# Patient Record
Sex: MALE | Race: WHITE | NOT HISPANIC OR LATINO | URBAN - METROPOLITAN AREA
[De-identification: names, ages, dates, MRNs, and addresses within clinical notes are randomized per-mention and may not be internally consistent; named-entity substitution may affect disease eponyms.]

---

## 2019-07-15 ENCOUNTER — EMERGENCY (EMERGENCY)
Facility: HOSPITAL | Age: 69
LOS: 0 days | Discharge: HOME | End: 2019-07-15
Admitting: EMERGENCY MEDICINE
Payer: MEDICARE

## 2019-07-15 VITALS — HEIGHT: 67 IN | WEIGHT: 250 LBS

## 2019-07-15 VITALS
HEART RATE: 93 BPM | TEMPERATURE: 98 F | RESPIRATION RATE: 18 BRPM | SYSTOLIC BLOOD PRESSURE: 125 MMHG | DIASTOLIC BLOOD PRESSURE: 64 MMHG | OXYGEN SATURATION: 98 %

## 2019-07-15 DIAGNOSIS — S91.202A UNSPECIFIED OPEN WOUND OF LEFT GREAT TOE WITH DAMAGE TO NAIL, INITIAL ENCOUNTER: ICD-10-CM
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
## 2019-07-15 DIAGNOSIS — M79.675 PAIN IN LEFT TOE(S): ICD-10-CM
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
## 2019-07-15 DIAGNOSIS — B35.1 TINEA UNGUIUM: ICD-10-CM

## 2019-07-15 PROCEDURE — 99283 EMERGENCY DEPT VISIT LOW MDM: CPT

## 2019-07-15 PROCEDURE — 73620 X-RAY EXAM OF FOOT: CPT | Mod: 26,LT

## 2019-07-15 PROCEDURE — 73610 X-RAY EXAM OF ANKLE: CPT | Mod: 26,LT

## 2019-07-15 RX ORDER — TETANUS TOXOID, REDUCED DIPHTHERIA TOXOID AND ACELLULAR PERTUSSIS VACCINE, ADSORBED 5; 2.5; 8; 8; 2.5 [IU]/.5ML; [IU]/.5ML; UG/.5ML; UG/.5ML; UG/.5ML
0.5 SUSPENSION INTRAMUSCULAR ONCE
Refills: 0 | Status: COMPLETED | OUTPATIENT
Start: 2019-07-15 | End: 2019-07-15

## 2019-07-15 RX ORDER — IBUPROFEN 200 MG
600 TABLET ORAL ONCE
Refills: 0 | Status: COMPLETED | OUTPATIENT
Start: 2019-07-15 | End: 2019-07-15

## 2019-07-15 RX ORDER — CEPHALEXIN 500 MG
1 CAPSULE ORAL
Qty: 20 | Refills: 0
Start: 2019-07-15 | End: 2019-07-24

## 2019-07-15 RX ADMIN — Medication 600 MILLIGRAM(S): at 21:54

## 2019-07-15 RX ADMIN — TETANUS TOXOID, REDUCED DIPHTHERIA TOXOID AND ACELLULAR PERTUSSIS VACCINE, ADSORBED 0.5 MILLILITER(S): 5; 2.5; 8; 8; 2.5 SUSPENSION INTRAMUSCULAR at 21:54

## 2019-07-15 NOTE — ED PROVIDER NOTE - CLINICAL SUMMARY MEDICAL DECISION MAKING FREE TEXT BOX
70 y/o M with stated PMH presents with near fall, causing his L toenail, which was already loose due to long-standing onychomycosis, to partially avulse x hrs. no active bleeding. +mild ankle swelling. XR neg for acute fx/dislocation. toenail dressed in sterile fashion and pt counseled on podiatry f/u in am. Reviewed all results and necessity for follow up. Counseled on red flags and to return for them.  Patient appears well on discharge.

## 2019-07-15 NOTE — ED PROVIDER NOTE - NS ED ROS FT
Review of Systems    Constitutional: (-) fever   Cardiovascular: (-) chest pain, (-) syncope  Respiratory: (-) cough, (-) shortness of breath  Gastrointestinal: (-) vomiting  Integumentary: (-) rash, see hpi   Hematologic: (-) easy bruising

## 2019-07-15 NOTE — ED PROVIDER NOTE - NSFOLLOWUPINSTRUCTIONS_ED_ALL_ED_FT
Fall Prevention in the Home  ImageFalls can cause injuries. They can happen to people of all ages. There are many things you can do to make your home safe and to help prevent falls.    What can I do on the outside of my home?  Regularly fix the edges of walkways and driveways and fix any cracks.  Remove anything that might make you trip as you walk through a door, such as a raised step or threshold.  Trim any bushes or trees on the path to your home.  Use bright outdoor lighting.  Clear any walking paths of anything that might make someone trip, such as rocks or tools.  Regularly check to see if handrails are loose or broken. Make sure that both sides of any steps have handrails.  Any raised decks and porches should have guardrails on the edges.  Have any leaves, snow, or ice cleared regularly.  Use sand or salt on walking paths during winter.  Clean up any spills in your garage right away. This includes oil or grease spills.  What can I do in the bathroom?  Use night lights.  Install grab bars by the toilet and in the tub and shower. Do not use towel bars as grab bars.  Use non-skid mats or decals in the tub or shower.  If you need to sit down in the shower, use a plastic, non-slip stool.  Keep the floor dry. Clean up any water that spills on the floor as soon as it happens.  Remove soap buildup in the tub or shower regularly.  Attach bath mats securely with double-sided non-slip rug tape.  Do not have throw rugs and other things on the floor that can make you trip.  What can I do in the bedroom?  Use night lights.  Make sure that you have a light by your bed that is easy to reach.  Do not use any sheets or blankets that are too big for your bed. They should not hang down onto the floor.  Have a firm chair that has side arms. You can use this for support while you get dressed.  Do not have throw rugs and other things on the floor that can make you trip.  What can I do in the kitchen?  Clean up any spills right away.  Avoid walking on wet floors.  Keep items that you use a lot in easy-to-reach places.  If you need to reach something above you, use a strong step stool that has a grab bar.  Keep electrical cords out of the way.  Do not use floor polish or wax that makes floors slippery. If you must use wax, use non-skid floor wax.  Do not have throw rugs and other things on the floor that can make you trip.  What can I do with my stairs?  Do not leave any items on the stairs.  Make sure that there are handrails on both sides of the stairs and use them. Fix handrails that are broken or loose. Make sure that handrails are as long as the stairways.  Check any carpeting to make sure that it is firmly attached to the stairs. Fix any carpet that is loose or worn.  Avoid having throw rugs at the top or bottom of the stairs. If you do have throw rugs, attach them to the floor with carpet tape.  Make sure that you have a light switch at the top of the stairs and the bottom of the stairs. If you do not have them, ask someone to add them for you.  What else can I do to help prevent falls?  Wear shoes that:    Do not have high heels.  Have rubber bottoms.  Are comfortable and fit you well.  Are closed at the toe. Do not wear sandals.    If you use a stepladder:    Make sure that it is fully opened. Do not climb a closed stepladder.  Make sure that both sides of the stepladder are locked into place.  Ask someone to hold it for you, if possible.    Clearly savita and make sure that you can see:    Any grab bars or handrails.  First and last steps.  Where the edge of each step is.    Use tools that help you move around (mobility aids) if they are needed. These include:    Canes.  Walkers.  Scooters.  Crutches.    Turn on the lights when you go into a dark area. Replace any light bulbs as soon as they burn out.  Set up your furniture so you have a clear path. Avoid moving your furniture around.  If any of your floors are uneven, fix them.  If there are any pets around you, be aware of where they are.  Review your medicines with your doctor. Some medicines can make you feel dizzy. This can increase your chance of falling.  Ask your doctor what other things that you can do to help prevent falls.    This information is not intended to replace advice given to you by your health care provider. Make sure you discuss any questions you have with your health care provider.

## 2019-07-15 NOTE — ED PROVIDER NOTE - PHYSICAL EXAMINATION
PHYSICAL EXAM:    GENERAL: Alert, appears stated age, well appearing, non-toxic  SKIN: Warm, pink and dry. MMM. no other signs of trauma.   EYE: Normal lids/conjunctiva  ENT: Normal hearing, patent oropharynx   Pulm: Bilateral BS, normal resp effort, no wheezes, stridor, or retractions  CV: RRR, no M/R/G, 2+and = DP pulses   Mskel: no erythema, cyanosis. no calf tenderness +mild medial malleolus edema. +onychomycosis of all toes +L great toe with partial nail avulsion. no nail bed laceration visualized. no active bleeding/FB. wound explored in bloodless field after copious irrigation FROM throughout with each joint isolated.   Neuro: AAOx3, no sensory/motor deficits,  5/5 strength throughout.

## 2019-07-15 NOTE — ED PROVIDER NOTE - NSFOLLOWUPCLINICS_GEN_ALL_ED_FT
[facility] Family [facility] Doctor  Family Medicine  .  [place]   Phone:   Fax:   Follow Up Time: 1-3 Days    [id number]  Podiatry  .  [place]   Phone: [phone]  Fax:   Follow Up Time: 1-3 Days

## 2019-07-15 NOTE — ED PROVIDER NOTE - CARE PLAN
Principal Discharge DX:	Nail avulsion of toe  Secondary Diagnosis:	Onychomycosis  Secondary Diagnosis:	Fall

## 2019-07-15 NOTE — ED ADULT TRIAGE NOTE - CADM TRG TX PRIOR TO ARRIVAL
Text: The above diagnosis and findings were noted on the exam but not discussed at this time with the patient. Detail Level: Zone none

## 2019-07-15 NOTE — ED PROVIDER NOTE - OBJECTIVE STATEMENT
70 y/o M with PMH gout, CAD s/p stents and CABG x 3 presents with L great toe partial nail avulsion x hrs s/p mechanical slip and near fall, causing him to scrape his L great toe on asphalt. +mild throbbing non-radiating L ankle/foot pain/swelling. no head injury/LOC. on asa, no other blood thinners. denies other injuries, decreased ROM, paraesthesias, change in color, warmth, FB, large blood loss, knowledge of last tetanus, lightheadedness, palpitations, LOC.

## 2019-07-16 RX ORDER — CEPHALEXIN 500 MG
1 CAPSULE ORAL
Qty: 20 | Refills: 0
Start: 2019-07-16 | End: 2019-07-25

## 2021-04-14 ENCOUNTER — INPATIENT (INPATIENT)
Facility: HOSPITAL | Age: 71
LOS: 14 days | Discharge: HOME HEALTH | End: 2021-04-29
Attending: HOSPITALIST | Admitting: HOSPITALIST
Payer: MEDICARE

## 2021-04-14 VITALS
RESPIRATION RATE: 20 BRPM | DIASTOLIC BLOOD PRESSURE: 76 MMHG | HEIGHT: 67 IN | HEART RATE: 86 BPM | SYSTOLIC BLOOD PRESSURE: 126 MMHG | OXYGEN SATURATION: 92 %

## 2021-04-14 LAB
ALBUMIN SERPL ELPH-MCNC: 3.3 G/DL — LOW (ref 3.5–5.2)
ALP SERPL-CCNC: 68 U/L — SIGNIFICANT CHANGE UP (ref 30–115)
ALT FLD-CCNC: 17 U/L — SIGNIFICANT CHANGE UP (ref 0–41)
ANION GAP SERPL CALC-SCNC: 16 MMOL/L — HIGH (ref 7–14)
APTT BLD: 33.7 SEC — SIGNIFICANT CHANGE UP (ref 27–39.2)
AST SERPL-CCNC: 40 U/L — SIGNIFICANT CHANGE UP (ref 0–41)
BASOPHILS # BLD AUTO: 0.01 K/UL — SIGNIFICANT CHANGE UP (ref 0–0.2)
BASOPHILS NFR BLD AUTO: 0.1 % — SIGNIFICANT CHANGE UP (ref 0–1)
BILIRUB SERPL-MCNC: 0.5 MG/DL — SIGNIFICANT CHANGE UP (ref 0.2–1.2)
BUN SERPL-MCNC: 60 MG/DL — HIGH (ref 10–20)
CALCIUM SERPL-MCNC: 8.5 MG/DL — SIGNIFICANT CHANGE UP (ref 8.5–10.1)
CHLORIDE SERPL-SCNC: 103 MMOL/L — SIGNIFICANT CHANGE UP (ref 98–110)
CO2 SERPL-SCNC: 18 MMOL/L — SIGNIFICANT CHANGE UP (ref 17–32)
CREAT SERPL-MCNC: 1.9 MG/DL — HIGH (ref 0.7–1.5)
D DIMER BLD IA.RAPID-MCNC: 320 NG/ML DDU — HIGH (ref 0–230)
EOSINOPHIL # BLD AUTO: 0 K/UL — SIGNIFICANT CHANGE UP (ref 0–0.7)
EOSINOPHIL NFR BLD AUTO: 0 % — SIGNIFICANT CHANGE UP (ref 0–8)
GLUCOSE SERPL-MCNC: 107 MG/DL — HIGH (ref 70–99)
HCT VFR BLD CALC: 39.6 % — LOW (ref 42–52)
HGB BLD-MCNC: 13.1 G/DL — LOW (ref 14–18)
IMM GRANULOCYTES NFR BLD AUTO: 1 % — HIGH (ref 0.1–0.3)
INR BLD: 1.21 RATIO — SIGNIFICANT CHANGE UP (ref 0.65–1.3)
LYMPHOCYTES # BLD AUTO: 1.12 K/UL — LOW (ref 1.2–3.4)
LYMPHOCYTES # BLD AUTO: 12.8 % — LOW (ref 20.5–51.1)
MCHC RBC-ENTMCNC: 31 PG — SIGNIFICANT CHANGE UP (ref 27–31)
MCHC RBC-ENTMCNC: 33.1 G/DL — SIGNIFICANT CHANGE UP (ref 32–37)
MCV RBC AUTO: 93.8 FL — SIGNIFICANT CHANGE UP (ref 80–94)
MONOCYTES # BLD AUTO: 0.71 K/UL — HIGH (ref 0.1–0.6)
MONOCYTES NFR BLD AUTO: 8.1 % — SIGNIFICANT CHANGE UP (ref 1.7–9.3)
NEUTROPHILS # BLD AUTO: 6.81 K/UL — HIGH (ref 1.4–6.5)
NEUTROPHILS NFR BLD AUTO: 78 % — HIGH (ref 42.2–75.2)
NRBC # BLD: 0 /100 WBCS — SIGNIFICANT CHANGE UP (ref 0–0)
NT-PROBNP SERPL-SCNC: 256 PG/ML — SIGNIFICANT CHANGE UP (ref 0–300)
PLATELET # BLD AUTO: 188 K/UL — SIGNIFICANT CHANGE UP (ref 130–400)
POTASSIUM SERPL-MCNC: 4.3 MMOL/L — SIGNIFICANT CHANGE UP (ref 3.5–5)
POTASSIUM SERPL-SCNC: 4.3 MMOL/L — SIGNIFICANT CHANGE UP (ref 3.5–5)
PROT SERPL-MCNC: 6.1 G/DL — SIGNIFICANT CHANGE UP (ref 6–8)
PROTHROM AB SERPL-ACNC: 13.9 SEC — HIGH (ref 9.95–12.87)
RBC # BLD: 4.22 M/UL — LOW (ref 4.7–6.1)
RBC # FLD: 14.2 % — SIGNIFICANT CHANGE UP (ref 11.5–14.5)
SARS-COV-2 RNA SPEC QL NAA+PROBE: DETECTED
SODIUM SERPL-SCNC: 137 MMOL/L — SIGNIFICANT CHANGE UP (ref 135–146)
TROPONIN T SERPL-MCNC: <0.01 NG/ML — SIGNIFICANT CHANGE UP
WBC # BLD: 8.74 K/UL — SIGNIFICANT CHANGE UP (ref 4.8–10.8)
WBC # FLD AUTO: 8.74 K/UL — SIGNIFICANT CHANGE UP (ref 4.8–10.8)

## 2021-04-14 PROCEDURE — 99285 EMERGENCY DEPT VISIT HI MDM: CPT

## 2021-04-14 PROCEDURE — 93010 ELECTROCARDIOGRAM REPORT: CPT

## 2021-04-14 PROCEDURE — 71045 X-RAY EXAM CHEST 1 VIEW: CPT | Mod: 26

## 2021-04-14 RX ORDER — DEXAMETHASONE 0.5 MG/5ML
6 ELIXIR ORAL ONCE
Refills: 0 | Status: COMPLETED | OUTPATIENT
Start: 2021-04-14 | End: 2021-04-14

## 2021-04-14 RX ADMIN — Medication 6 MILLIGRAM(S): at 23:45

## 2021-04-14 NOTE — ED PROVIDER NOTE - NS ED ROS FT
Constitutional: + weakness. no fever, chills, no recent weight loss, change in appetite or malaise  Eyes: no redness/discharge/pain/vision changes  ENT: no rhinorrhea/ear pain/sore throat  Cardiac: No chest pain or edema.  Respiratory: + cough and sob. No respiratory distress  GI: + diarrhea. No nausea, vomiting or abdominal pain.  : No dysuria, frequency, urgency or hematuria  MS: no pain to back or extremities, no loss of ROM, no weakness  Neuro: No headache or weakness. No LOC.  Skin: No skin rash.  Endocrine: No history of thyroid disease or diabetes.  Except as documented in the HPI, all other systems are negative.

## 2021-04-14 NOTE — ED ADULT NURSE NOTE - OBJECTIVE STATEMENT
Pt presents to ED for positive COVID 2 weeks ago. COmplaining of cough and weakness. No fevers at home. Pt also complaining of SOB. 88% on RA upon arrival to room. No distress noted. Placed on O2 @ 3L. SPO2 96%.

## 2021-04-14 NOTE — ED PROVIDER NOTE - OBJECTIVE STATEMENT
71 year old M with hx of "open heart surgery" x 10 years ago on asa, HLD, CKD c/o sob x 5 days. Pt sts initially tested covid + x 3 weeks ago (was asymptomatic at the time but has + contact). Sts for the last 4 days has had sob, cough, weakness and diarrhea. Sts pulse ox was 85% today so came to ED. No fever/chills, leg pain/swelling, nausea, vomiting, abd pain, urinary symptoms. + former smoker.     Hx obtained via Yi  #[id number] Yamini

## 2021-04-14 NOTE — ED PROVIDER NOTE - CLINICAL SUMMARY MEDICAL DECISION MAKING FREE TEXT BOX
hypoxia 2/2 covid - cxr covid pna - saturating well on 3lnc - Cr 1.9, no priors for comparison - admitted for suppl O2 & further mgmt

## 2021-04-14 NOTE — ED PROVIDER NOTE - ATTENDING CONTRIBUTION TO CARE
71M former smoker pmh cad/stent/cabg, hl, ckf, gout p/w hypoxia 2/2 covid. Dx 2-3 wks ago, rpts continued cough & sob, msring pulse @ home today was 85% so came to ED. 88% on RA in ED -> improved to 96% on 3lnc. Denies f/c, sore throat, cp, nvd, abd pain, edema, rash. PCP Mariano.    PE:  elderly m nad  skin warm, dry  ncat  neck supple  rrr nl s1s2 no mrg  nl wob good air entry bl ctab no wrr  abd soft ntnd no palpable masses no rgr  back non-tender no cvat  ext no cce dpi  neuro aaox3 grossly nf exam

## 2021-04-15 DIAGNOSIS — Z95.1 PRESENCE OF AORTOCORONARY BYPASS GRAFT: Chronic | ICD-10-CM

## 2021-04-15 DIAGNOSIS — S42.302A UNSPECIFIED FRACTURE OF SHAFT OF HUMERUS, LEFT ARM, INITIAL ENCOUNTER FOR CLOSED FRACTURE: Chronic | ICD-10-CM

## 2021-04-15 LAB
ANION GAP SERPL CALC-SCNC: 17 MMOL/L — HIGH (ref 7–14)
ANISOCYTOSIS BLD QL: SLIGHT — SIGNIFICANT CHANGE UP
BASOPHILS # BLD AUTO: 0 K/UL — SIGNIFICANT CHANGE UP (ref 0–0.2)
BASOPHILS NFR BLD AUTO: 0 % — SIGNIFICANT CHANGE UP (ref 0–1)
BUN SERPL-MCNC: 60 MG/DL — HIGH (ref 10–20)
BURR CELLS BLD QL SMEAR: PRESENT — SIGNIFICANT CHANGE UP
CALCIUM SERPL-MCNC: 9.1 MG/DL — SIGNIFICANT CHANGE UP (ref 8.5–10.1)
CHLORIDE SERPL-SCNC: 102 MMOL/L — SIGNIFICANT CHANGE UP (ref 98–110)
CO2 SERPL-SCNC: 19 MMOL/L — SIGNIFICANT CHANGE UP (ref 17–32)
CREAT SERPL-MCNC: 1.9 MG/DL — HIGH (ref 0.7–1.5)
CRP SERPL-MCNC: 243 MG/L — HIGH
EOSINOPHIL # BLD AUTO: 0 K/UL — SIGNIFICANT CHANGE UP (ref 0–0.7)
EOSINOPHIL NFR BLD AUTO: 0 % — SIGNIFICANT CHANGE UP (ref 0–8)
FERRITIN SERPL-MCNC: 1593 NG/ML — HIGH (ref 30–400)
GIANT PLATELETS BLD QL SMEAR: PRESENT — SIGNIFICANT CHANGE UP
GLUCOSE SERPL-MCNC: 151 MG/DL — HIGH (ref 70–99)
HCT VFR BLD CALC: 39.3 % — LOW (ref 42–52)
HCV AB S/CO SERPL IA: 0.03 COI — SIGNIFICANT CHANGE UP
HCV AB SERPL-IMP: SIGNIFICANT CHANGE UP
HGB BLD-MCNC: 13 G/DL — LOW (ref 14–18)
LYMPHOCYTES # BLD AUTO: 0.25 K/UL — LOW (ref 1.2–3.4)
LYMPHOCYTES # BLD AUTO: 3.4 % — LOW (ref 20.5–51.1)
MACROCYTES BLD QL: SLIGHT — SIGNIFICANT CHANGE UP
MAGNESIUM SERPL-MCNC: 2.3 MG/DL — SIGNIFICANT CHANGE UP (ref 1.8–2.4)
MANUAL SMEAR VERIFICATION: SIGNIFICANT CHANGE UP
MCHC RBC-ENTMCNC: 31.1 PG — HIGH (ref 27–31)
MCHC RBC-ENTMCNC: 33.1 G/DL — SIGNIFICANT CHANGE UP (ref 32–37)
MCV RBC AUTO: 94 FL — SIGNIFICANT CHANGE UP (ref 80–94)
MONOCYTES # BLD AUTO: 0.39 K/UL — SIGNIFICANT CHANGE UP (ref 0.1–0.6)
MONOCYTES NFR BLD AUTO: 5.2 % — SIGNIFICANT CHANGE UP (ref 1.7–9.3)
NEUTROPHILS # BLD AUTO: 6.68 K/UL — HIGH (ref 1.4–6.5)
NEUTROPHILS NFR BLD AUTO: 89.7 % — HIGH (ref 42.2–75.2)
PHOSPHATE SERPL-MCNC: 4.9 MG/DL — SIGNIFICANT CHANGE UP (ref 2.1–4.9)
PLAT MORPH BLD: ABNORMAL
PLATELET # BLD AUTO: 211 K/UL — SIGNIFICANT CHANGE UP (ref 130–400)
POIKILOCYTOSIS BLD QL AUTO: SLIGHT — SIGNIFICANT CHANGE UP
POTASSIUM SERPL-MCNC: 4.7 MMOL/L — SIGNIFICANT CHANGE UP (ref 3.5–5)
POTASSIUM SERPL-SCNC: 4.7 MMOL/L — SIGNIFICANT CHANGE UP (ref 3.5–5)
PROCALCITONIN SERPL-MCNC: 0.36 NG/ML — HIGH (ref 0.02–0.1)
RBC # BLD: 4.18 M/UL — LOW (ref 4.7–6.1)
RBC # FLD: 14.3 % — SIGNIFICANT CHANGE UP (ref 11.5–14.5)
RBC BLD AUTO: ABNORMAL
SODIUM SERPL-SCNC: 138 MMOL/L — SIGNIFICANT CHANGE UP (ref 135–146)
VARIANT LYMPHS # BLD: 1.7 % — SIGNIFICANT CHANGE UP (ref 0–5)
WBC # BLD: 7.45 K/UL — SIGNIFICANT CHANGE UP (ref 4.8–10.8)
WBC # FLD AUTO: 7.45 K/UL — SIGNIFICANT CHANGE UP (ref 4.8–10.8)

## 2021-04-15 PROCEDURE — 99223 1ST HOSP IP/OBS HIGH 75: CPT

## 2021-04-15 PROCEDURE — 76770 US EXAM ABDO BACK WALL COMP: CPT | Mod: 26

## 2021-04-15 RX ORDER — EZETIMIBE 10 MG/1
0 TABLET ORAL
Qty: 0 | Refills: 0 | DISCHARGE

## 2021-04-15 RX ORDER — FUROSEMIDE 40 MG
0 TABLET ORAL
Qty: 0 | Refills: 0 | DISCHARGE

## 2021-04-15 RX ORDER — ASPIRIN/CALCIUM CARB/MAGNESIUM 324 MG
81 TABLET ORAL DAILY
Refills: 0 | Status: DISCONTINUED | OUTPATIENT
Start: 2021-04-15 | End: 2021-04-29

## 2021-04-15 RX ORDER — CHLORHEXIDINE GLUCONATE 213 G/1000ML
1 SOLUTION TOPICAL
Refills: 0 | Status: DISCONTINUED | OUTPATIENT
Start: 2021-04-15 | End: 2021-04-29

## 2021-04-15 RX ORDER — TAMSULOSIN HYDROCHLORIDE 0.4 MG/1
1 CAPSULE ORAL
Qty: 0 | Refills: 0 | DISCHARGE

## 2021-04-15 RX ORDER — ASCORBIC ACID 60 MG
0 TABLET,CHEWABLE ORAL
Qty: 0 | Refills: 0 | DISCHARGE

## 2021-04-15 RX ORDER — ALLOPURINOL 300 MG
300 TABLET ORAL DAILY
Refills: 0 | Status: DISCONTINUED | OUTPATIENT
Start: 2021-04-15 | End: 2021-04-29

## 2021-04-15 RX ORDER — EZETIMIBE 10 MG/1
1 TABLET ORAL
Qty: 0 | Refills: 0 | DISCHARGE

## 2021-04-15 RX ORDER — ATORVASTATIN CALCIUM 80 MG/1
80 TABLET, FILM COATED ORAL AT BEDTIME
Refills: 0 | Status: DISCONTINUED | OUTPATIENT
Start: 2021-04-15 | End: 2021-04-28

## 2021-04-15 RX ORDER — RANOLAZINE 500 MG/1
1000 TABLET, FILM COATED, EXTENDED RELEASE ORAL
Refills: 0 | Status: DISCONTINUED | OUTPATIENT
Start: 2021-04-15 | End: 2021-04-29

## 2021-04-15 RX ORDER — DEXAMETHASONE 0.5 MG/5ML
6 ELIXIR ORAL DAILY
Refills: 0 | Status: COMPLETED | OUTPATIENT
Start: 2021-04-15 | End: 2021-04-23

## 2021-04-15 RX ORDER — ASPIRIN/CALCIUM CARB/MAGNESIUM 324 MG
1 TABLET ORAL
Qty: 0 | Refills: 0 | DISCHARGE

## 2021-04-15 RX ORDER — ALLOPURINOL 300 MG
1 TABLET ORAL
Qty: 0 | Refills: 0 | DISCHARGE

## 2021-04-15 RX ORDER — METOPROLOL TARTRATE 50 MG
100 TABLET ORAL
Refills: 0 | Status: DISCONTINUED | OUTPATIENT
Start: 2021-04-15 | End: 2021-04-29

## 2021-04-15 RX ORDER — FUROSEMIDE 40 MG
1 TABLET ORAL
Qty: 0 | Refills: 0 | DISCHARGE

## 2021-04-15 RX ORDER — ALLOPURINOL 300 MG
0 TABLET ORAL
Qty: 0 | Refills: 0 | DISCHARGE

## 2021-04-15 RX ORDER — GEMFIBROZIL 600 MG
600 TABLET ORAL
Refills: 0 | Status: DISCONTINUED | OUTPATIENT
Start: 2021-04-15 | End: 2021-04-29

## 2021-04-15 RX ORDER — TAMSULOSIN HYDROCHLORIDE 0.4 MG/1
0.4 CAPSULE ORAL AT BEDTIME
Refills: 0 | Status: DISCONTINUED | OUTPATIENT
Start: 2021-04-15 | End: 2021-04-29

## 2021-04-15 RX ORDER — METOPROLOL TARTRATE 50 MG
0 TABLET ORAL
Qty: 0 | Refills: 0 | DISCHARGE

## 2021-04-15 RX ORDER — RANOLAZINE 500 MG/1
0 TABLET, FILM COATED, EXTENDED RELEASE ORAL
Qty: 0 | Refills: 0 | DISCHARGE

## 2021-04-15 RX ORDER — RANOLAZINE 500 MG/1
1 TABLET, FILM COATED, EXTENDED RELEASE ORAL
Qty: 0 | Refills: 0 | DISCHARGE

## 2021-04-15 RX ORDER — PARICALCITOL 2 UG/1
1 CAPSULE, GELATIN COATED ORAL
Qty: 0 | Refills: 0 | DISCHARGE

## 2021-04-15 RX ORDER — HEPARIN SODIUM 5000 [USP'U]/ML
7500 INJECTION INTRAVENOUS; SUBCUTANEOUS EVERY 12 HOURS
Refills: 0 | Status: DISCONTINUED | OUTPATIENT
Start: 2021-04-15 | End: 2021-04-27

## 2021-04-15 RX ORDER — GEMFIBROZIL 600 MG
0 TABLET ORAL
Qty: 0 | Refills: 0 | DISCHARGE

## 2021-04-15 RX ORDER — SODIUM CHLORIDE 9 MG/ML
1000 INJECTION INTRAMUSCULAR; INTRAVENOUS; SUBCUTANEOUS
Refills: 0 | Status: DISCONTINUED | OUTPATIENT
Start: 2021-04-15 | End: 2021-04-16

## 2021-04-15 RX ORDER — METOPROLOL TARTRATE 50 MG
1 TABLET ORAL
Qty: 0 | Refills: 0 | DISCHARGE

## 2021-04-15 RX ORDER — TAMSULOSIN HYDROCHLORIDE 0.4 MG/1
0 CAPSULE ORAL
Qty: 0 | Refills: 0 | DISCHARGE

## 2021-04-15 RX ORDER — LISINOPRIL 2.5 MG/1
0 TABLET ORAL
Qty: 0 | Refills: 0 | DISCHARGE

## 2021-04-15 RX ADMIN — HEPARIN SODIUM 7500 UNIT(S): 5000 INJECTION INTRAVENOUS; SUBCUTANEOUS at 07:01

## 2021-04-15 RX ADMIN — Medication 100 MILLIGRAM(S): at 07:00

## 2021-04-15 RX ADMIN — RANOLAZINE 1000 MILLIGRAM(S): 500 TABLET, FILM COATED, EXTENDED RELEASE ORAL at 17:55

## 2021-04-15 RX ADMIN — Medication 6 MILLIGRAM(S): at 07:01

## 2021-04-15 RX ADMIN — Medication 100 MILLIGRAM(S): at 17:56

## 2021-04-15 RX ADMIN — Medication 10 MILLIGRAM(S): at 21:39

## 2021-04-15 RX ADMIN — CHLORHEXIDINE GLUCONATE 1 APPLICATION(S): 213 SOLUTION TOPICAL at 07:00

## 2021-04-15 RX ADMIN — Medication 600 MILLIGRAM(S): at 17:58

## 2021-04-15 RX ADMIN — Medication 81 MILLIGRAM(S): at 12:06

## 2021-04-15 RX ADMIN — HEPARIN SODIUM 7500 UNIT(S): 5000 INJECTION INTRAVENOUS; SUBCUTANEOUS at 17:58

## 2021-04-15 RX ADMIN — Medication 300 MILLIGRAM(S): at 12:06

## 2021-04-15 RX ADMIN — RANOLAZINE 1000 MILLIGRAM(S): 500 TABLET, FILM COATED, EXTENDED RELEASE ORAL at 07:00

## 2021-04-15 RX ADMIN — SODIUM CHLORIDE 70 MILLILITER(S): 9 INJECTION INTRAMUSCULAR; INTRAVENOUS; SUBCUTANEOUS at 12:08

## 2021-04-15 RX ADMIN — ATORVASTATIN CALCIUM 80 MILLIGRAM(S): 80 TABLET, FILM COATED ORAL at 21:39

## 2021-04-15 RX ADMIN — Medication 600 MILLIGRAM(S): at 07:00

## 2021-04-15 RX ADMIN — TAMSULOSIN HYDROCHLORIDE 0.4 MILLIGRAM(S): 0.4 CAPSULE ORAL at 21:39

## 2021-04-15 NOTE — H&P ADULT - NSHPPHYSICALEXAM_GEN_ALL_CORE
GENERAL: NAD, well-developed, AAOx3  HEENT:  Atraumatic, Normocephalic. EOMI, PERRLA, conjunctiva and sclera clear, No JVD  PULMONARY: Clear to auscultation bilaterally; No wheeze  CARDIOVASCULAR: Regular rate and rhythm; No murmurs, rubs, or gallops  GASTROINTESTINAL: Soft, Nontender, Nondistended; Bowel sounds present  MUSCULOSKELETAL:  2+ Peripheral Pulses, No clubbing, cyanosis, or edema  NEUROLOGY: non-focal  SKIN: No rashes or lesions GENERAL: NAD, well-developed, obese, AAOx3  HEENT:  Atraumatic, Normocephalic. EOMI, conjunctiva and sclera clear, No JVD  PULMONARY: mild respiratory effort, Clear to auscultation bilaterally; No wheeze  CARDIOVASCULAR: Regular rate and rhythm; No murmurs, rubs, or gallops  GASTROINTESTINAL: Soft, mildly general tenderness to palpation, Nondistended; Bowel sounds present  MUSCULOSKELETAL: no cyanosis or edema  NEUROLOGY: non-focal  SKIN: No rashes or lesions

## 2021-04-15 NOTE — CHART NOTE - NSCHARTNOTEFT_GEN_A_CORE
I made rounds today with the treatment team including the hospitalist, residents,  nurses, and discussed the patient's current medical status and discharge  planning needs, and reviewed the chart.    T(C): 36.7 (04-15-21 @ 05:39), Max: 37.2 (04-14-21 @ 21:27)  HR: 89 (04-15-21 @ 05:39) (80 - 89)  BP: 104/56 (04-15-21 @ 05:39) (104/56 - 126/76)  RR: 18 (04-15-21 @ 05:39) (18 - 22)  SpO2: 96% (04-15-21 @ 05:39) (88% - 96%)          I reached out to the patient's health care proxy/ responsible family member-           [     ]  I reached                                     and discussed the patient's medical condition,                   family concerns, and discharge planning           [     ]  I left a message with family               [  x   ]  I personally participated in rounds with the medical team and my resident and discussed the case. My resident reached                   family member/ HCP      Narda, daughter, [phone]                          under my direction and supervision  and we reviewed the conversation.          [     ]  My resident left a message with family under my direction and supervision           [     ]   My resident attended medical rounds and called the family                [   x   ]    The following was discussed:  The patient's medical status over the past 24 hrs was reviewed, as well as oxygen needs and medication changes and labs. On 3 liters NC O2. On Decadron. To check Covid antibodies.          [  x    ]   The following concerns were raised: none          [     ] I spent 5-10 minutes on the above discussing medical issues with team members and family and/ or my resident    [   x  ] I spent 11-20 minutes on the above discussing medical issues with team members and family and/ or my resident    [     ] I spent 21-30 minutes on the above discussing medical issues with team members and family and/ or my resident

## 2021-04-15 NOTE — H&P ADULT - NSICDXPASTSURGICALHX_GEN_ALL_CORE_FT
PAST SURGICAL HISTORY:  Arm fracture, left s/p repair    S/P CABG (coronary artery bypass graft)

## 2021-04-15 NOTE — H&P ADULT - ASSESSMENT
Pt is a 71 year old male with PMHx of CAD s/p CABG ~10 years ago, HTN, BPH, HLD, CKD due to R sided renal disease? presents with SOB, cough, general, malaise, and decreased PO intake for past 4-5 days.     # COVID-19, severe disease  - satting in mid 80s on RA, currently ~94 on 5L NC  - out of window for remdesivir  - started on course 6mg dexa for 10 days  - f/u inflammatory markers  - f/u COVID ab for possible plasma  - d-dimer 320, no calf pain  - f/u ID eval    # RUTH? vs CKD due to R sided renal disease?, likely pre-renal in setting of decreased PO intake  - Cr 1.9, GFR 35 on admission  - pt reports that he specifically has R sided renal disease, unknown cause  - holding lisinopril and lasix for now  - f/u AM labs    # CAD s/p CABG ~10 years ago  - continue asa 81, metoprolol, Ranolazine    # HTN- controlled  - continue home metoprolol  - holding lasix and lisinopril due to possible RUTH, f/u AM labs and order accordingly    # BPH  - continue flomax    # HLD  - continue statin, gemfibrozil  - ezetimibe non formulary      DVT ppx: heparin 7500 BID as per protocol; f/u height/weight to confirm dosing  GI ppx: NA  Diet: DASH, Halal  Code Status: Full Code

## 2021-04-15 NOTE — H&P ADULT - TIME BILLING
complete patient's bedside assessment, review medical chart, discuss medical plan of care with covering medical team

## 2021-04-15 NOTE — H&P ADULT - NSICDXFAMILYHX_GEN_ALL_CORE_FT
FAMILY HISTORY:  Father  Still living? No  FH: prostate cancer, Age at diagnosis: Age Unknown    Mother  Still living? No  FH: uterine cancer, Age at diagnosis: Age Unknown

## 2021-04-15 NOTE — CONSULT NOTE ADULT - ASSESSMENT
ASSESSMENT  71 year old male with PMHx of CAD s/p CABG ~10 years ago, HTN, BPH, HLD, CKD due to R sided renal disease? tested positive for COVID19 3 weeks ago, presents with SOB, cough, general, malaise, and decreased PO intake for past 4-5 days.     IMPRESSION  #COVID19 PNA, Severe (O2 < or = 94% on RA and requiring supplemental O2), inflammatory phase    Tested + 3 weeks prior after family member exposure    CXR bilateral opacities     Admission WBC 8   D-Dimer Assay, Quantitative: 320 ng/mL DDU (04-14-21 @ 21:00)  #CKD  Creatinine, Serum: 1.9 (04-14-21 @ 21:00)      RECOMMENDATIONS  - Past benefit of RDV  - Check COVID Ab and if NEGATIVE, can offer and consent for convalescent Plasma: 1 unit over 2h  (Please  patient- will not be able to receive vaccine x 90 days)  - Dexamethasone 6mg x 10 days or until Discharge (whichever is shorter), any taper per Pulm  - ONLY If requiring HFNC or BIPAP and CRP >75 --> Tocilizumab x1 (Wt <65 kg= 400 mg, 65-90 kg =600 mg, >90 kg =800 mg)  - A/C per primary team  - Prone positioning if possible  - Monitor off Abx , viral PNA    If any questions, please call or send a message on [employer] Teams  Spectra [id number]

## 2021-04-15 NOTE — CONSULT NOTE ADULT - SUBJECTIVE AND OBJECTIVE BOX
JORDIN GARCIA  71y, Male  Allergy: No Known Allergies      CHIEF COMPLAINT:   COVID-19 (15 Apr 2021 00:03)      LOS  1d    HPI  HPI:  Pt is a 71 year old male with PMHx of CAD s/p CABG ~10 years ago, HTN, BPH, HLD, CKD due to R sided renal disease? presents with SOB, cough, general, malaise, and decreased PO intake for past 4-5 days. Pt reports that family member tested positive fort COVID 3 weeks ago so he got tested and was positive as well. Symptoms were very mild up until 4-5 days ago.  x 5 days. Pulse ox at home today was in the 80s so he came to ED. No headache, fever, nausea, vomiting,  leg pain/swelling, change in bowel habits or urinary symptoms.     In the ED: satting in 80s on RA with mild tachypnea. Vitals otherwise wnl.  CBC with lymphopenia  Cr 1.9, unknown baseline  D-dimer 320  Trop x1, BNP wnl (15 Apr 2021 00:03)      INFECTIOUS DISEASE HISTORY:  Satting 96% on 3L  CXR bilateral opacities   D-Dimer Assay, Quantitative: 320 ng/mL DDU (04-14-21 @ 21:00)      PMH  PAST MEDICAL & SURGICAL HISTORY:  CAD (coronary artery disease)  s/p CABG    High cholesterol    Chronic kidney disease, unspecified CKD stage    BPH (benign prostatic hyperplasia)    HTN (hypertension)    S/P CABG (coronary artery bypass graft)    Arm fracture, left  s/p repair        FAMILY HISTORY  FH: prostate cancer (Father)    FH: uterine cancer (Mother)        SOCIAL HISTORY  Social History:  Lives at home.  Former smoker.  Denies alcohol or illicit drug use. (15 Apr 2021 00:03)        ROS  General: Denies rigors, nightsweats  HEENT: Denies headache, rhinorrhea, sore throat, eye pain  CV: Denies CP, palpitations  PULM: Denies wheezing, hemoptysis  GI: Denies hematemesis, hematochezia, melena  : Denies discharge, hematuria  MSK: Denies arthralgias, myalgias  SKIN: Denies rash, lesions  NEURO: Denies paresthesias, weakness  PSYCH: Denies depression, anxiety    VITALS:  T(F): 98, Max: 99 (04-14-21 @ 21:27)  HR: 89  BP: 104/56  RR: 18Vital Signs Last 24 Hrs  T(C): 36.7 (15 Apr 2021 05:39), Max: 37.2 (14 Apr 2021 21:27)  T(F): 98 (15 Apr 2021 05:39), Max: 99 (14 Apr 2021 21:27)  HR: 89 (15 Apr 2021 05:39) (80 - 89)  BP: 104/56 (15 Apr 2021 05:39) (104/56 - 126/76)  BP(mean): --  RR: 18 (15 Apr 2021 05:39) (18 - 22)  SpO2: 96% (15 Apr 2021 05:39) (88% - 96%)      TESTS & MEASUREMENTS:                        13.1   8.74  )-----------( 188      ( 14 Apr 2021 21:00 )             39.6     04-14    137  |  103  |  60<H>  ----------------------------<  107<H>  4.3   |  18  |  1.9<H>    Ca    8.5      14 Apr 2021 21:00    TPro  6.1  /  Alb  3.3<L>  /  TBili  0.5  /  DBili  x   /  AST  40  /  ALT  17  /  AlkPhos  68  04-14    eGFR if African American: 40 mL/min/1.73M2 (04-14-21 @ 21:00)  eGFR if Non African American: 35 mL/min/1.73M2 (04-14-21 @ 21:00)    LIVER FUNCTIONS - ( 14 Apr 2021 21:00 )  Alb: 3.3 g/dL / Pro: 6.1 g/dL / ALK PHOS: 68 U/L / ALT: 17 U/L / AST: 40 U/L / GGT: x                     INFECTIOUS DISEASES TESTING  COVID-19 PCR: Detected (04-14-21 @ 21:00)      INFLAMMATORY MARKERS      RADIOLOGY & ADDITIONAL TESTS:  I have personally reviewed the last Chest xray  CXR      CT      CARDIOLOGY TESTING      MEDICATIONS  allopurinol 300 Oral daily  aspirin enteric coated 81 Oral daily  atorvastatin 80 Oral at bedtime  chlorhexidine 4% Liquid 1 Topical <User Schedule>  dexAMETHasone  Injectable 6 IV Push daily  dicyclomine 10 Oral daily  gemfibrozil 600 Oral two times a day  heparin   Injectable 7500 SubCutaneous every 12 hours  metoprolol tartrate 100 Oral two times a day  ranolazine 1000 Oral two times a day  tamsulosin 0.4 Oral at bedtime      Weight      ANTIBIOTICS:      ALLERGIES:  No Known Allergies

## 2021-04-15 NOTE — H&P ADULT - NSICDXPASTMEDICALHX_GEN_ALL_CORE_FT
PAST MEDICAL HISTORY:  BPH (benign prostatic hyperplasia)     CAD (coronary artery disease) s/p CABG    Chronic kidney disease, unspecified CKD stage     High cholesterol     HTN (hypertension)

## 2021-04-15 NOTE — H&P ADULT - ATTENDING COMMENTS
Patient reports no dyspnea at rest, no cough, tolerating diet well.  a/p:  Pt is a 71 year old male with PMHx of CAD s/p CABG ~10 years ago, HTN, BPH, HLD, CKD ,  presents with SOB, cough, general, malaise, and decreased PO intake for past 4-5 days.     # Acute hypoxic resp. failure due to COVID-19, severe disease  - satting 96% on 3 L NC, most likely pt. is in late inflammatory state,  out of window for remdesivir,  started on course 6mg dexa for 10 days  - f/u inflammatory markers, maintain isolation  - f/u COVID ab for possible plasma  - d-dimer 320  - f/u ID     # CKD stage 3,   - Cr 1.9, GFR 35 on admission  -hold ace , monitor daily BMP, renal diet      # CAD s/p CABG ~10 years ago  - continue asa 81, metoprolol, Ranolazine    # HTN- controlled  - continue home metoprolol  - holding lasix and lisinopril     # BPH  - continue flomax    # HLD  - continue statin, gemfibrozil  - ezetimibe non formulary    # DVT proph.- Heparin subc. inj.

## 2021-04-15 NOTE — CONSULT NOTE ADULT - TIME BILLING
I have personally seen and examined this patient.  I have reviewed all pertinent clinical information and reviewed all relevant imaging and diagnostic studies personally.   I counseled the patient about diagnostic testing and treatment plan. All questions were answered.  I discussed recommendations with the primary team.

## 2021-04-15 NOTE — H&P ADULT - HISTORY OF PRESENT ILLNESS
Pt is a 71 year old male with PMHx of CAD s/p CABG ~10 years ago, HTN, BPH, HLD, CKD due to R sided renal disease? presents with SOB, cough, general, malaise, and decreased PO intake for past 4-5 days. Pt reports that family member tested positive fort COVID 3 weeks ago so he got tested and was positive as well. Symptoms were very mild up until 4-5 days ago.  x 5 days. Pulse ox at home today was in the 80s so he came to ED. No headache, fever, nausea, vomiting,  leg pain/swelling, change in bowel habits or urinary symptoms.     In the ED: satting in 80s on RA with mild tachypnea. Vitals otherwise wnl.  CBC with lymphopenia  Cr 1.9, unknown baseline  D-dimer 320  Trop x1, BNP wnl

## 2021-04-15 NOTE — H&P ADULT - NSHPREVIEWOFSYSTEMS_GEN_ALL_CORE
CONSTITUTIONAL: No weakness, fevers or chills, no weight loss   EYES/ENT: No visual changes;  No vertigo or throat pain   NECK: No pain or stiffness  RESPIRATORY: No cough, wheezing, hemoptysis; No shortness of breath  CARDIOVASCULAR: No chest pain or palpitations  GASTROINTESTINAL: No abdominal or epigastric pain. No nausea, vomiting, or hematemesis; No diarrhea or constipation. No melena or hematochezia.  GENITOURINARY: No dysuria, frequency or hematuria  NEUROLOGICAL: No numbness or weakness  All other review of systems is negative unless indicated above. CONSTITUTIONAL: +weakness. No fevers or chills, no weight loss   EYES/ENT: No visual changes;  No vertigo or throat pain   NECK: No pain or stiffness  RESPIRATORY: + cough and shortness of breath. No wheezing, hemoptysis  CARDIOVASCULAR: No chest pain or palpitations  GASTROINTESTINAL: mild generalized abdominal soreness. No nausea, vomiting, or hematemesis; No diarrhea or constipation. No melena or hematochezia.  GENITOURINARY: No dysuria, frequency or hematuria  NEUROLOGICAL: No numbness or weakness  All other review of systems is negative unless indicated above.

## 2021-04-16 LAB
ANION GAP SERPL CALC-SCNC: 10 MMOL/L — SIGNIFICANT CHANGE UP (ref 7–14)
BASOPHILS # BLD AUTO: 0.01 K/UL — SIGNIFICANT CHANGE UP (ref 0–0.2)
BASOPHILS NFR BLD AUTO: 0.1 % — SIGNIFICANT CHANGE UP (ref 0–1)
BUN SERPL-MCNC: 66 MG/DL — CRITICAL HIGH (ref 10–20)
CALCIUM SERPL-MCNC: 8.1 MG/DL — LOW (ref 8.5–10.1)
CHLORIDE SERPL-SCNC: 107 MMOL/L — SIGNIFICANT CHANGE UP (ref 98–110)
CO2 SERPL-SCNC: 19 MMOL/L — SIGNIFICANT CHANGE UP (ref 17–32)
COVID-19 NUCLEOCAPSID GAM AB INTERP: NEGATIVE — SIGNIFICANT CHANGE UP
COVID-19 NUCLEOCAPSID TOTAL GAM ANTIBODY RESULT: 0.81 INDEX — SIGNIFICANT CHANGE UP
COVID-19 SPIKE DOMAIN AB INTERP: POSITIVE
COVID-19 SPIKE DOMAIN ANTIBODY RESULT: 0.94 U/ML — HIGH
CREAT SERPL-MCNC: 1.7 MG/DL — HIGH (ref 0.7–1.5)
EOSINOPHIL # BLD AUTO: 0 K/UL — SIGNIFICANT CHANGE UP (ref 0–0.7)
EOSINOPHIL NFR BLD AUTO: 0 % — SIGNIFICANT CHANGE UP (ref 0–8)
GLUCOSE SERPL-MCNC: 115 MG/DL — HIGH (ref 70–99)
HCT VFR BLD CALC: 34.6 % — LOW (ref 42–52)
HGB BLD-MCNC: 11.5 G/DL — LOW (ref 14–18)
IMM GRANULOCYTES NFR BLD AUTO: 0.5 % — HIGH (ref 0.1–0.3)
LYMPHOCYTES # BLD AUTO: 0.87 K/UL — LOW (ref 1.2–3.4)
LYMPHOCYTES # BLD AUTO: 7.7 % — LOW (ref 20.5–51.1)
MCHC RBC-ENTMCNC: 31 PG — SIGNIFICANT CHANGE UP (ref 27–31)
MCHC RBC-ENTMCNC: 33.2 G/DL — SIGNIFICANT CHANGE UP (ref 32–37)
MCV RBC AUTO: 93.3 FL — SIGNIFICANT CHANGE UP (ref 80–94)
MONOCYTES # BLD AUTO: 0.61 K/UL — HIGH (ref 0.1–0.6)
MONOCYTES NFR BLD AUTO: 5.4 % — SIGNIFICANT CHANGE UP (ref 1.7–9.3)
NEUTROPHILS # BLD AUTO: 9.82 K/UL — HIGH (ref 1.4–6.5)
NEUTROPHILS NFR BLD AUTO: 86.3 % — HIGH (ref 42.2–75.2)
NRBC # BLD: 0 /100 WBCS — SIGNIFICANT CHANGE UP (ref 0–0)
PLATELET # BLD AUTO: 204 K/UL — SIGNIFICANT CHANGE UP (ref 130–400)
POTASSIUM SERPL-MCNC: 4.1 MMOL/L — SIGNIFICANT CHANGE UP (ref 3.5–5)
POTASSIUM SERPL-SCNC: 4.1 MMOL/L — SIGNIFICANT CHANGE UP (ref 3.5–5)
RBC # BLD: 3.71 M/UL — LOW (ref 4.7–6.1)
RBC # FLD: 14.1 % — SIGNIFICANT CHANGE UP (ref 11.5–14.5)
SARS-COV-2 IGG+IGM SERPL QL IA: 0.81 INDEX — SIGNIFICANT CHANGE UP
SARS-COV-2 IGG+IGM SERPL QL IA: 0.94 U/ML — HIGH
SARS-COV-2 IGG+IGM SERPL QL IA: NEGATIVE — SIGNIFICANT CHANGE UP
SARS-COV-2 IGG+IGM SERPL QL IA: POSITIVE
SODIUM SERPL-SCNC: 136 MMOL/L — SIGNIFICANT CHANGE UP (ref 135–146)
WBC # BLD: 11.37 K/UL — HIGH (ref 4.8–10.8)
WBC # FLD AUTO: 11.37 K/UL — HIGH (ref 4.8–10.8)

## 2021-04-16 PROCEDURE — 99233 SBSQ HOSP IP/OBS HIGH 50: CPT

## 2021-04-16 RX ORDER — PANTOPRAZOLE SODIUM 20 MG/1
40 TABLET, DELAYED RELEASE ORAL
Refills: 0 | Status: DISCONTINUED | OUTPATIENT
Start: 2021-04-16 | End: 2021-04-29

## 2021-04-16 RX ADMIN — ATORVASTATIN CALCIUM 80 MILLIGRAM(S): 80 TABLET, FILM COATED ORAL at 21:28

## 2021-04-16 RX ADMIN — Medication 600 MILLIGRAM(S): at 05:08

## 2021-04-16 RX ADMIN — Medication 6 MILLIGRAM(S): at 05:08

## 2021-04-16 RX ADMIN — HEPARIN SODIUM 7500 UNIT(S): 5000 INJECTION INTRAVENOUS; SUBCUTANEOUS at 05:09

## 2021-04-16 RX ADMIN — SODIUM CHLORIDE 70 MILLILITER(S): 9 INJECTION INTRAMUSCULAR; INTRAVENOUS; SUBCUTANEOUS at 05:10

## 2021-04-16 RX ADMIN — Medication 300 MILLIGRAM(S): at 11:20

## 2021-04-16 RX ADMIN — Medication 600 MILLIGRAM(S): at 17:05

## 2021-04-16 RX ADMIN — RANOLAZINE 1000 MILLIGRAM(S): 500 TABLET, FILM COATED, EXTENDED RELEASE ORAL at 17:05

## 2021-04-16 RX ADMIN — CHLORHEXIDINE GLUCONATE 1 APPLICATION(S): 213 SOLUTION TOPICAL at 05:10

## 2021-04-16 RX ADMIN — TAMSULOSIN HYDROCHLORIDE 0.4 MILLIGRAM(S): 0.4 CAPSULE ORAL at 21:29

## 2021-04-16 RX ADMIN — Medication 100 MILLIGRAM(S): at 17:05

## 2021-04-16 RX ADMIN — Medication 81 MILLIGRAM(S): at 11:20

## 2021-04-16 RX ADMIN — Medication 100 MILLIGRAM(S): at 05:08

## 2021-04-16 RX ADMIN — Medication 10 MILLIGRAM(S): at 11:20

## 2021-04-16 RX ADMIN — HEPARIN SODIUM 7500 UNIT(S): 5000 INJECTION INTRAVENOUS; SUBCUTANEOUS at 17:05

## 2021-04-16 RX ADMIN — RANOLAZINE 1000 MILLIGRAM(S): 500 TABLET, FILM COATED, EXTENDED RELEASE ORAL at 05:08

## 2021-04-16 NOTE — CHART NOTE - NSCHARTNOTEFT_GEN_A_CORE
I made rounds today with the treatment team including the hospitalist, residents,  nurses, and discussed the patient's current medical status and discharge  planning needs, and reviewed the chart.    T(C): 36.1 (04-16-21 @ 12:42), Max: 36.4 (04-16-21 @ 05:50)  HR: 73 (04-16-21 @ 12:42) (58 - 78)  BP: 109/65 (04-16-21 @ 12:42) (90/57 - 115/62)  RR: 18 (04-16-21 @ 12:42) (18 - 20)  SpO2: 91% (04-16-21 @ 12:42) (91% - 96%)          I reached out to the patient's health care proxy/ responsible family member-           [     ]  I reached                                     and discussed the patient's medical condition,                   family concerns, and discharge planning           [     ]  I left a message with family               [   x  ]  I personally participated in rounds with the medical team and my resident and discussed the case. My resident reached                   family member/ HCP      Narda, [phone]                          under my direction and supervision  and we reviewed the conversation.          [     ]  My resident left a message with family under my direction and supervision           [     ]   My resident attended medical rounds and called the family                [  x    ]    The following was discussed:  The patient's medical status over the past 24 hrs was reviewed, as well as oxygen needs and medication changes and labs. Stable on 3 liters NC O2. Decrease in GREEN. Creatinine better.          [   x   ]   The following concerns were raised: none          [     ] I spent 5-10 minutes on the above discussing medical issues with team members and family and/ or my resident    [  x   ] I spent 11-20 minutes on the above discussing medical issues with team members and family and/ or my resident    [     ] I spent 21-30 minutes on the above discussing medical issues with team members and family and/ or my resident

## 2021-04-16 NOTE — PROGRESS NOTE ADULT - ASSESSMENT
70 yo M with PMHx of CAD s/p CABG 10 yrs ago, HTN, BPH, HLD, CKD presents with worsening dyspnea, malaise, admitted for acute hypoxemic respiratory failure due to COVID.    #Acute hypoxemic respiratory failure secondary to COVID-19 pneumonia  - COVID-19 PCR positive  - Isolation precautions (contact, droplet, airborne)  - Chest X-ray: bilateral opacities  - Patient is saturating 95% on 3L NC  - C-Reactive Protein, Serum: 243 mg/L (04-14-21 @ 21:00), D-Dimer Assay, Quantitative: 320 ng/mL DDU (04-14-21 @ 21:00), Ferritin, Serum: 1593 ng/mL (04-14-21 @ 21:00), Procalcitonin, Serum: 0.36 ng/mL (04-14-21 @ 21:00)  - ID following - out of window for RDV, c/w Dexamethasone  - COVID AB pending  - Incentive spirometry at bedside  - DVT prophylaxis per protocol  - Titrate supplemental O2 to maintain SpO2 92-96%    #CKD with possible RUTH, unclear baseline Cr  - Cr 1.9 on admission  - Pt sees Nephrologist on [place], unsure of name  - Renal US revealed L renal cyst, no other abnormalities   - Home Lisinopril, Lasix on hold for now  - C/w IVF, f/u today's Cr  - I's and O's    #CAD s/p CABG 10 yrs ago  #HLD  - C/w Aspirin, Metoprolol, Ranolazine  - C/w Atorvastatin, Gemfibrozil    #BPH  - C/w Flomax    #HTN  - Well controlled  - Home Lisinopril, Lasix on hold due to Cr elevation    DVT ppx: HSQ  GI ppx: PPI  Diet: DASH  Activity: IAT  Full Code  Dispo: Acute

## 2021-04-17 LAB
ALBUMIN SERPL ELPH-MCNC: 3.4 G/DL — LOW (ref 3.5–5.2)
ALP SERPL-CCNC: 92 U/L — SIGNIFICANT CHANGE UP (ref 30–115)
ALT FLD-CCNC: 30 U/L — SIGNIFICANT CHANGE UP (ref 0–41)
ANION GAP SERPL CALC-SCNC: 13 MMOL/L — SIGNIFICANT CHANGE UP (ref 7–14)
AST SERPL-CCNC: 76 U/L — HIGH (ref 0–41)
BASOPHILS # BLD AUTO: 0.02 K/UL — SIGNIFICANT CHANGE UP (ref 0–0.2)
BASOPHILS NFR BLD AUTO: 0.1 % — SIGNIFICANT CHANGE UP (ref 0–1)
BILIRUB SERPL-MCNC: 0.4 MG/DL — SIGNIFICANT CHANGE UP (ref 0.2–1.2)
BUN SERPL-MCNC: 54 MG/DL — HIGH (ref 10–20)
CALCIUM SERPL-MCNC: 8.6 MG/DL — SIGNIFICANT CHANGE UP (ref 8.5–10.1)
CHLORIDE SERPL-SCNC: 106 MMOL/L — SIGNIFICANT CHANGE UP (ref 98–110)
CO2 SERPL-SCNC: 21 MMOL/L — SIGNIFICANT CHANGE UP (ref 17–32)
CREAT SERPL-MCNC: 1.5 MG/DL — SIGNIFICANT CHANGE UP (ref 0.7–1.5)
D DIMER BLD IA.RAPID-MCNC: 345 NG/ML DDU — HIGH (ref 0–230)
EOSINOPHIL # BLD AUTO: 0 K/UL — SIGNIFICANT CHANGE UP (ref 0–0.7)
EOSINOPHIL NFR BLD AUTO: 0 % — SIGNIFICANT CHANGE UP (ref 0–8)
GLUCOSE SERPL-MCNC: 117 MG/DL — HIGH (ref 70–99)
HCT VFR BLD CALC: 40.4 % — LOW (ref 42–52)
HGB BLD-MCNC: 13.6 G/DL — LOW (ref 14–18)
IMM GRANULOCYTES NFR BLD AUTO: 1.2 % — HIGH (ref 0.1–0.3)
LYMPHOCYTES # BLD AUTO: 0.7 K/UL — LOW (ref 1.2–3.4)
LYMPHOCYTES # BLD AUTO: 5.2 % — LOW (ref 20.5–51.1)
MCHC RBC-ENTMCNC: 31.3 PG — HIGH (ref 27–31)
MCHC RBC-ENTMCNC: 33.7 G/DL — SIGNIFICANT CHANGE UP (ref 32–37)
MCV RBC AUTO: 93.1 FL — SIGNIFICANT CHANGE UP (ref 80–94)
MONOCYTES # BLD AUTO: 0.53 K/UL — SIGNIFICANT CHANGE UP (ref 0.1–0.6)
MONOCYTES NFR BLD AUTO: 4 % — SIGNIFICANT CHANGE UP (ref 1.7–9.3)
NEUTROPHILS # BLD AUTO: 12 K/UL — HIGH (ref 1.4–6.5)
NEUTROPHILS NFR BLD AUTO: 89.5 % — HIGH (ref 42.2–75.2)
NRBC # BLD: 0 /100 WBCS — SIGNIFICANT CHANGE UP (ref 0–0)
PLATELET # BLD AUTO: 253 K/UL — SIGNIFICANT CHANGE UP (ref 130–400)
POTASSIUM SERPL-MCNC: 4.9 MMOL/L — SIGNIFICANT CHANGE UP (ref 3.5–5)
POTASSIUM SERPL-SCNC: 4.9 MMOL/L — SIGNIFICANT CHANGE UP (ref 3.5–5)
PROT SERPL-MCNC: 6.6 G/DL — SIGNIFICANT CHANGE UP (ref 6–8)
RBC # BLD: 4.34 M/UL — LOW (ref 4.7–6.1)
RBC # FLD: 14.2 % — SIGNIFICANT CHANGE UP (ref 11.5–14.5)
SODIUM SERPL-SCNC: 140 MMOL/L — SIGNIFICANT CHANGE UP (ref 135–146)
WBC # BLD: 13.41 K/UL — HIGH (ref 4.8–10.8)
WBC # FLD AUTO: 13.41 K/UL — HIGH (ref 4.8–10.8)

## 2021-04-17 PROCEDURE — 99233 SBSQ HOSP IP/OBS HIGH 50: CPT

## 2021-04-17 PROCEDURE — 71045 X-RAY EXAM CHEST 1 VIEW: CPT | Mod: 26

## 2021-04-17 RX ORDER — ACETAMINOPHEN 500 MG
650 TABLET ORAL EVERY 6 HOURS
Refills: 0 | Status: DISCONTINUED | OUTPATIENT
Start: 2021-04-17 | End: 2021-04-29

## 2021-04-17 RX ADMIN — Medication 650 MILLIGRAM(S): at 22:40

## 2021-04-17 RX ADMIN — Medication 650 MILLIGRAM(S): at 23:10

## 2021-04-17 RX ADMIN — Medication 100 MILLIGRAM(S): at 05:20

## 2021-04-17 RX ADMIN — TAMSULOSIN HYDROCHLORIDE 0.4 MILLIGRAM(S): 0.4 CAPSULE ORAL at 21:08

## 2021-04-17 RX ADMIN — RANOLAZINE 1000 MILLIGRAM(S): 500 TABLET, FILM COATED, EXTENDED RELEASE ORAL at 05:20

## 2021-04-17 RX ADMIN — HEPARIN SODIUM 7500 UNIT(S): 5000 INJECTION INTRAVENOUS; SUBCUTANEOUS at 17:19

## 2021-04-17 RX ADMIN — HEPARIN SODIUM 7500 UNIT(S): 5000 INJECTION INTRAVENOUS; SUBCUTANEOUS at 05:20

## 2021-04-17 RX ADMIN — RANOLAZINE 1000 MILLIGRAM(S): 500 TABLET, FILM COATED, EXTENDED RELEASE ORAL at 21:08

## 2021-04-17 RX ADMIN — Medication 6 MILLIGRAM(S): at 05:20

## 2021-04-17 RX ADMIN — CHLORHEXIDINE GLUCONATE 1 APPLICATION(S): 213 SOLUTION TOPICAL at 06:27

## 2021-04-17 RX ADMIN — ATORVASTATIN CALCIUM 80 MILLIGRAM(S): 80 TABLET, FILM COATED ORAL at 21:08

## 2021-04-17 RX ADMIN — Medication 100 MILLIGRAM(S): at 17:18

## 2021-04-17 RX ADMIN — PANTOPRAZOLE SODIUM 40 MILLIGRAM(S): 20 TABLET, DELAYED RELEASE ORAL at 06:28

## 2021-04-17 RX ADMIN — Medication 300 MILLIGRAM(S): at 11:40

## 2021-04-17 RX ADMIN — Medication 600 MILLIGRAM(S): at 05:20

## 2021-04-17 RX ADMIN — Medication 10 MILLIGRAM(S): at 11:40

## 2021-04-17 RX ADMIN — Medication 81 MILLIGRAM(S): at 11:40

## 2021-04-17 RX ADMIN — Medication 600 MILLIGRAM(S): at 17:19

## 2021-04-17 NOTE — CONSULT NOTE ADULT - SUBJECTIVE AND OBJECTIVE BOX
Date of Admission:    CHIEF COMPLAINT: short of breath    HISTORY OF PRESENT ILLNESS: 71 y Male with PMH below presented to the hospital for worsening short of breath     PAST MEDICAL & SURGICAL HISTORY:  CAD (coronary artery disease)  s/p CABG  Aortic dissection chronic    High cholesterol    Chronic kidney disease, unspecified CKD stage    BPH (benign prostatic hyperplasia)    HTN (hypertension)    S/P CABG (coronary artery bypass graft)    Arm fracture, left  s/p repair      HEALTH ISSUES - PROBLEM Dx:        FAMILY HISTORY:  FH: prostate cancer (Father)    FH: uterine cancer (Mother)      Allergies    No Known Allergies    Intolerances    	  Home Medications:  ALLOPURINOL  TAB 300M tab(s) orally once a day (15 Apr 2021 01:43)  aspirin 81 mg oral tablet: 1 tab(s) orally once a day (15 Apr 2021 01:43)  ATORVASTATIN 80 MG TABLET: TAKE 1 TABLET BY MOUTH EVERY DAY (15 Apr 2021 01:43)  DICYCLOMINE  CAP 10M cap(s) orally once a day (at bedtime) (15 Apr 2021 01:43)  EZETIMIBE    TAB 10M  orally once a day (15 Apr 2021 01:43)  FUROSEMIDE   TAB 20M tab(s) orally once a day (15 Apr 2021 01:43)  GEMFIBROZIL  TAB 600M tab(s) orally 2 times a day (15 Apr 2021 01:43)  LISINOPRIL   TAB 10M tab(s) orally once a day (15 Apr 2021 01:43)  METOPROL TAR TAB 100M tab(s) orally 2 times a day (15 Apr 2021 01:43)  paricalcitol 1 mcg oral capsule: 1 cap(s) orally once a day (15 Apr 2021 01:43)  RANOLAZINE   TAB 1000M tab(s) orally 2 times a day (15 Apr 2021 01:43)  TAMSULOSIN   CAP 0.4M cap(s) orally once a day (at bedtime) (15 Apr 2021 01:43)    MEDICATIONS  (STANDING):  allopurinol 300 milliGRAM(s) Oral daily  aspirin enteric coated 81 milliGRAM(s) Oral daily  atorvastatin 80 milliGRAM(s) Oral at bedtime  chlorhexidine 4% Liquid 1 Application(s) Topical <User Schedule>  dexAMETHasone  Injectable 6 milliGRAM(s) IV Push daily  dicyclomine 10 milliGRAM(s) Oral daily  gemfibrozil 600 milliGRAM(s) Oral two times a day  heparin   Injectable 7500 Unit(s) SubCutaneous every 12 hours  metoprolol tartrate 100 milliGRAM(s) Oral two times a day  pantoprazole    Tablet 40 milliGRAM(s) Oral before breakfast  ranolazine 1000 milliGRAM(s) Oral two times a day  tamsulosin 0.4 milliGRAM(s) Oral at bedtime    MEDICATIONS  (PRN):              SOCIAL HISTORY:    [x ] Non-smoker  [ ] Smoker  [ ] Alcohol      REVIEW OF SYSTEMS:  CONSTITUTIONAL: No fever, weight loss, or fatigue  CARDIOLOGY: PAtient denies chest pain, shortness of breath or syncopal episodes.   RESPIRATORY: denies shortness of breath, wheezeing.   NEUROLOGICAL: NO weakness, no focal deficits to report.  ENDOCRINOLOGICAL: no recent change in diabetic medications.   GI: no BRBPR, no N,V,diarrhea.    PSYCHIATRY: normal mood and affect  HEENT: no nasal discharge, no ecchymosis  SKIN: no ecchymosis, no breakdown  MUSCULOSKELETAL: Full range of motion x4.      PHYSICAL EXAM:  T(C): 35.3 (21 @ 14:32), Max: 35.8 (21 @ 20:11)  HR: 75 (21 @ 14:32) (64 - 83)  BP: 137/73 (21 @ 14:32) (123/58 - 143/68)  RR: 20 (21 @ 14:32) (18 - 20)  SpO2: 97% (21 @ 15:09) (77% - 97%)  Wt(kg): --  I&O's Summary    Daily     Daily     General Appearance: Normal	  Cardiovascular: Normal S1 S2, No JVD, No murmurs, No edema  Respiratory: Lungs clear to auscultation	  Psychiatry: A & O x 3, Mood & affect appropriate  Gastrointestinal:  Soft, Non-tender  Skin: No rashes, No ecchymoses, No cyanosis	  Neurologic: Non-focal  Extremities: Normal range of motion, No clubbing, cyanosis or edema  Vascular: Peripheral pulses palpable 2+ bilaterally        LABS:	 	                          13.6   13.41 )-----------( 253      ( 2021 06:34 )             40.4     04-17    140  |  106  |  54<H>  ----------------------------<  117<H>  4.9   |  21  |  1.5    Ca    8.6      2021 06:34    TPro  6.6  /  Alb  3.4<L>  /  TBili  0.4  /  DBili  x   /  AST  76<H>  /  ALT  30  /  AlkPhos  92              proBNP:   Lipid Profile:   HgA1c:   TSH:       CARDIAC MARKERS:            TELEMETRY EVENTS: 	    ECG:  	  RADIOLOGY:  OTHER: 	    PREVIOUS DIAGNOSTIC TESTING:    [ ] Echocardiogram:  [ ]  Catheterization:  [ ] Stress Test:  	  	  ASSESSMENT/PLAN: 	    Patient post covid on O2 therapy  Aortic dissection type B old and stable  CAD post CABG on ASA  CKD 3

## 2021-04-18 LAB
ALBUMIN SERPL ELPH-MCNC: 3.1 G/DL — LOW (ref 3.5–5.2)
ALP SERPL-CCNC: 101 U/L — SIGNIFICANT CHANGE UP (ref 30–115)
ALT FLD-CCNC: 32 U/L — SIGNIFICANT CHANGE UP (ref 0–41)
ANION GAP SERPL CALC-SCNC: 14 MMOL/L — SIGNIFICANT CHANGE UP (ref 7–14)
AST SERPL-CCNC: 70 U/L — HIGH (ref 0–41)
BASOPHILS # BLD AUTO: 0.02 K/UL — SIGNIFICANT CHANGE UP (ref 0–0.2)
BASOPHILS NFR BLD AUTO: 0.2 % — SIGNIFICANT CHANGE UP (ref 0–1)
BILIRUB SERPL-MCNC: 0.4 MG/DL — SIGNIFICANT CHANGE UP (ref 0.2–1.2)
BUN SERPL-MCNC: 47 MG/DL — HIGH (ref 10–20)
CALCIUM SERPL-MCNC: 8.2 MG/DL — LOW (ref 8.5–10.1)
CHLORIDE SERPL-SCNC: 107 MMOL/L — SIGNIFICANT CHANGE UP (ref 98–110)
CO2 SERPL-SCNC: 17 MMOL/L — SIGNIFICANT CHANGE UP (ref 17–32)
CREAT SERPL-MCNC: 1.3 MG/DL — SIGNIFICANT CHANGE UP (ref 0.7–1.5)
CRP SERPL-MCNC: 131 MG/L — HIGH
EOSINOPHIL # BLD AUTO: 0 K/UL — SIGNIFICANT CHANGE UP (ref 0–0.7)
EOSINOPHIL NFR BLD AUTO: 0 % — SIGNIFICANT CHANGE UP (ref 0–8)
GLUCOSE SERPL-MCNC: 96 MG/DL — SIGNIFICANT CHANGE UP (ref 70–99)
HCT VFR BLD CALC: 38.8 % — LOW (ref 42–52)
HGB BLD-MCNC: 13 G/DL — LOW (ref 14–18)
IMM GRANULOCYTES NFR BLD AUTO: 1.7 % — HIGH (ref 0.1–0.3)
LYMPHOCYTES # BLD AUTO: 0.76 K/UL — LOW (ref 1.2–3.4)
LYMPHOCYTES # BLD AUTO: 6.8 % — LOW (ref 20.5–51.1)
MCHC RBC-ENTMCNC: 31 PG — SIGNIFICANT CHANGE UP (ref 27–31)
MCHC RBC-ENTMCNC: 33.5 G/DL — SIGNIFICANT CHANGE UP (ref 32–37)
MCV RBC AUTO: 92.4 FL — SIGNIFICANT CHANGE UP (ref 80–94)
MONOCYTES # BLD AUTO: 0.6 K/UL — SIGNIFICANT CHANGE UP (ref 0.1–0.6)
MONOCYTES NFR BLD AUTO: 5.4 % — SIGNIFICANT CHANGE UP (ref 1.7–9.3)
NEUTROPHILS # BLD AUTO: 9.55 K/UL — HIGH (ref 1.4–6.5)
NEUTROPHILS NFR BLD AUTO: 85.9 % — HIGH (ref 42.2–75.2)
NRBC # BLD: 0 /100 WBCS — SIGNIFICANT CHANGE UP (ref 0–0)
PLATELET # BLD AUTO: 216 K/UL — SIGNIFICANT CHANGE UP (ref 130–400)
POTASSIUM SERPL-MCNC: 5.1 MMOL/L — HIGH (ref 3.5–5)
POTASSIUM SERPL-SCNC: 5.1 MMOL/L — HIGH (ref 3.5–5)
PROCALCITONIN SERPL-MCNC: 0.23 NG/ML — HIGH (ref 0.02–0.1)
PROT SERPL-MCNC: 6.1 G/DL — SIGNIFICANT CHANGE UP (ref 6–8)
RBC # BLD: 4.2 M/UL — LOW (ref 4.7–6.1)
RBC # FLD: 14.2 % — SIGNIFICANT CHANGE UP (ref 11.5–14.5)
SODIUM SERPL-SCNC: 138 MMOL/L — SIGNIFICANT CHANGE UP (ref 135–146)
WBC # BLD: 11.12 K/UL — HIGH (ref 4.8–10.8)
WBC # FLD AUTO: 11.12 K/UL — HIGH (ref 4.8–10.8)

## 2021-04-18 PROCEDURE — 99233 SBSQ HOSP IP/OBS HIGH 50: CPT

## 2021-04-18 RX ORDER — TOCILIZUMAB 20 MG/ML
600 INJECTION, SOLUTION, CONCENTRATE INTRAVENOUS ONCE
Refills: 0 | Status: COMPLETED | OUTPATIENT
Start: 2021-04-18 | End: 2021-04-18

## 2021-04-18 RX ADMIN — Medication 100 MILLIGRAM(S): at 17:32

## 2021-04-18 RX ADMIN — Medication 100 MILLIGRAM(S): at 05:15

## 2021-04-18 RX ADMIN — TOCILIZUMAB 100 MILLIGRAM(S): 20 INJECTION, SOLUTION, CONCENTRATE INTRAVENOUS at 15:34

## 2021-04-18 RX ADMIN — Medication 10 MILLIGRAM(S): at 12:25

## 2021-04-18 RX ADMIN — HEPARIN SODIUM 7500 UNIT(S): 5000 INJECTION INTRAVENOUS; SUBCUTANEOUS at 17:32

## 2021-04-18 RX ADMIN — PANTOPRAZOLE SODIUM 40 MILLIGRAM(S): 20 TABLET, DELAYED RELEASE ORAL at 05:13

## 2021-04-18 RX ADMIN — RANOLAZINE 1000 MILLIGRAM(S): 500 TABLET, FILM COATED, EXTENDED RELEASE ORAL at 17:31

## 2021-04-18 RX ADMIN — RANOLAZINE 1000 MILLIGRAM(S): 500 TABLET, FILM COATED, EXTENDED RELEASE ORAL at 05:13

## 2021-04-18 RX ADMIN — Medication 81 MILLIGRAM(S): at 12:25

## 2021-04-18 RX ADMIN — Medication 600 MILLIGRAM(S): at 17:31

## 2021-04-18 RX ADMIN — HEPARIN SODIUM 7500 UNIT(S): 5000 INJECTION INTRAVENOUS; SUBCUTANEOUS at 05:13

## 2021-04-18 RX ADMIN — Medication 6 MILLIGRAM(S): at 05:14

## 2021-04-18 RX ADMIN — Medication 600 MILLIGRAM(S): at 05:15

## 2021-04-18 RX ADMIN — CHLORHEXIDINE GLUCONATE 1 APPLICATION(S): 213 SOLUTION TOPICAL at 05:13

## 2021-04-18 RX ADMIN — Medication 300 MILLIGRAM(S): at 12:25

## 2021-04-18 RX ADMIN — TAMSULOSIN HYDROCHLORIDE 0.4 MILLIGRAM(S): 0.4 CAPSULE ORAL at 21:36

## 2021-04-18 RX ADMIN — ATORVASTATIN CALCIUM 80 MILLIGRAM(S): 80 TABLET, FILM COATED ORAL at 21:36

## 2021-04-18 NOTE — PROGRESS NOTE ADULT - ASSESSMENT
ASSESSMENT  71 year old male with PMHx of CAD s/p CABG ~10 years ago, HTN, BPH, HLD, CKD due to R sided renal disease? tested positive for COVID19 3 weeks ago, presents with SOB, cough, general, malaise, and decreased PO intake for past 4-5 days.     IMPRESSION  #COVID19 PNA, Severe (O2 < or = 94% on RA and requiring supplemental O2), inflammatory phase    Tested + 3 weeks prior after family member exposure  COVID-19 Matti Domain AB Interp: Positive (04-15-21 @ 11:45)  COVID-19 Nucleocapsid SHAYY AB Interp: Negative (04-15-21 @ 11:45)    CXR bilateral opacities     Admission WBC 8   D-Dimer Assay, Quantitative: 320 ng/mL DDU (04-14-21 @ 21:00)  #CKD  Creatinine, Serum: 1.9 (04-14-21 @ 21:00)      RECOMMENDATIONS  - As on HFNC Tocilizumab x1 (Wt <65 kg= 400 mg, 65-90 kg =600 mg, >90 kg =800 mg)  - Dexamethasone 6mg x 10 days or until Discharge (whichever is shorter), any taper per Pulm  - A/C per primary team  - Prone positioning if possible  - Monitor off Abx , viral PNA    If any questions, please call or send a message on [employer] Teams  Spectra [phone]

## 2021-04-18 NOTE — PROGRESS NOTE ADULT - ASSESSMENT
72 yo M with PMHx of CAD s/p CABG 10 yrs ago, HTN, BPH, HLD, CKD presents with worsening dyspnea, malaise, admitted for acute hypoxemic respiratory failure due to COVID.    #Acute hypoxic respiratory failure secondary to COVID-19 pneumonia  COVID-19 PCR positive ~3 weeks ago. Symptoms mild until 4-5 days worsening SOB prior to admission.   -Chest X-ray 4/17: slightly increased bilateral opacities  -Patient desaturated upon ambulation 4/17. currently on 15LPM NRB > wean as tolerated   -inflammatory markers:  --d-dimer 320 4/14 > 345 4/17  -- 4/14 > 131 4/17  --ferritin 1593 4/14 > pending  --procalc 0.36 4/14 > 0.23 4/17  - ID following - out of window for RDV, c/w Dexamethasone 6mg qD (day 5)  - COVID AB = positive, no role for plasma    #CKD with possible RUTH, unclear baseline Cr  - Cr 1.9 on admission  - Pt sees Nephrologist on [place], unsure of name  - Renal US revealed L renal cyst, no other abnormalities   - Home Lisinopril, Lasix on hold for now  - s/p IVF, Cr 4/17 1.5  - monitor I's and O's    #CAD s/p CABG 10 yrs ago  #HLD  - C/w Aspirin 81mg qD, Metoprolol 100mg BID, Ranolazine 1000mg BID  - C/w Atorvastatin 80mg qHS, Gemfibrozil 600mg BID    #BPH  - C/w Flomax 0.4mg qHS    #HTN  - Home Lisinopril, Lasix on hold due to Cr elevation  - c/w metoprolol 100mg BID    DVT ppx: Heparin SubQ 7500U BID  GI ppx: pantoprazole 40mg qD  Labs: CMP+Mg, CBC  Diet: DASH/TLC  PT/Rehab: Evaluation pending  Activity: Ambulate as tolerated  Dispo: Home pending clinical improvement  FULL CODE

## 2021-04-19 LAB
ALBUMIN SERPL ELPH-MCNC: 3 G/DL — LOW (ref 3.5–5.2)
ALP SERPL-CCNC: 113 U/L — SIGNIFICANT CHANGE UP (ref 30–115)
ALT FLD-CCNC: 31 U/L — SIGNIFICANT CHANGE UP (ref 0–41)
ANION GAP SERPL CALC-SCNC: 13 MMOL/L — SIGNIFICANT CHANGE UP (ref 7–14)
AST SERPL-CCNC: 59 U/L — HIGH (ref 0–41)
BASOPHILS # BLD AUTO: 0.02 K/UL — SIGNIFICANT CHANGE UP (ref 0–0.2)
BASOPHILS NFR BLD AUTO: 0.2 % — SIGNIFICANT CHANGE UP (ref 0–1)
BILIRUB SERPL-MCNC: 0.6 MG/DL — SIGNIFICANT CHANGE UP (ref 0.2–1.2)
BUN SERPL-MCNC: 46 MG/DL — HIGH (ref 10–20)
CALCIUM SERPL-MCNC: 8.2 MG/DL — LOW (ref 8.5–10.1)
CHLORIDE SERPL-SCNC: 107 MMOL/L — SIGNIFICANT CHANGE UP (ref 98–110)
CO2 SERPL-SCNC: 19 MMOL/L — SIGNIFICANT CHANGE UP (ref 17–32)
CREAT SERPL-MCNC: 1.4 MG/DL — SIGNIFICANT CHANGE UP (ref 0.7–1.5)
D DIMER BLD IA.RAPID-MCNC: 768 NG/ML DDU — HIGH (ref 0–230)
EOSINOPHIL # BLD AUTO: 0.01 K/UL — SIGNIFICANT CHANGE UP (ref 0–0.7)
EOSINOPHIL NFR BLD AUTO: 0.1 % — SIGNIFICANT CHANGE UP (ref 0–8)
FERRITIN SERPL-MCNC: 2329 NG/ML — HIGH (ref 30–400)
GLUCOSE SERPL-MCNC: 99 MG/DL — SIGNIFICANT CHANGE UP (ref 70–99)
HCT VFR BLD CALC: 38.9 % — LOW (ref 42–52)
HGB BLD-MCNC: 13.3 G/DL — LOW (ref 14–18)
IMM GRANULOCYTES NFR BLD AUTO: 1.9 % — HIGH (ref 0.1–0.3)
LYMPHOCYTES # BLD AUTO: 0.84 K/UL — LOW (ref 1.2–3.4)
LYMPHOCYTES # BLD AUTO: 8 % — LOW (ref 20.5–51.1)
MAGNESIUM SERPL-MCNC: 2.8 MG/DL — HIGH (ref 1.8–2.4)
MCHC RBC-ENTMCNC: 31.3 PG — HIGH (ref 27–31)
MCHC RBC-ENTMCNC: 34.2 G/DL — SIGNIFICANT CHANGE UP (ref 32–37)
MCV RBC AUTO: 91.5 FL — SIGNIFICANT CHANGE UP (ref 80–94)
MONOCYTES # BLD AUTO: 0.47 K/UL — SIGNIFICANT CHANGE UP (ref 0.1–0.6)
MONOCYTES NFR BLD AUTO: 4.5 % — SIGNIFICANT CHANGE UP (ref 1.7–9.3)
NEUTROPHILS # BLD AUTO: 8.91 K/UL — HIGH (ref 1.4–6.5)
NEUTROPHILS NFR BLD AUTO: 85.3 % — HIGH (ref 42.2–75.2)
NRBC # BLD: 0 /100 WBCS — SIGNIFICANT CHANGE UP (ref 0–0)
PLATELET # BLD AUTO: 278 K/UL — SIGNIFICANT CHANGE UP (ref 130–400)
POTASSIUM SERPL-MCNC: 5 MMOL/L — SIGNIFICANT CHANGE UP (ref 3.5–5)
POTASSIUM SERPL-SCNC: 5 MMOL/L — SIGNIFICANT CHANGE UP (ref 3.5–5)
PROT SERPL-MCNC: 6.3 G/DL — SIGNIFICANT CHANGE UP (ref 6–8)
RBC # BLD: 4.25 M/UL — LOW (ref 4.7–6.1)
RBC # FLD: 14.3 % — SIGNIFICANT CHANGE UP (ref 11.5–14.5)
SODIUM SERPL-SCNC: 139 MMOL/L — SIGNIFICANT CHANGE UP (ref 135–146)
WBC # BLD: 10.45 K/UL — SIGNIFICANT CHANGE UP (ref 4.8–10.8)
WBC # FLD AUTO: 10.45 K/UL — SIGNIFICANT CHANGE UP (ref 4.8–10.8)

## 2021-04-19 PROCEDURE — 71045 X-RAY EXAM CHEST 1 VIEW: CPT | Mod: 26

## 2021-04-19 PROCEDURE — 99222 1ST HOSP IP/OBS MODERATE 55: CPT

## 2021-04-19 PROCEDURE — 99233 SBSQ HOSP IP/OBS HIGH 50: CPT

## 2021-04-19 RX ORDER — LANOLIN ALCOHOL/MO/W.PET/CERES
5 CREAM (GRAM) TOPICAL AT BEDTIME
Refills: 0 | Status: DISCONTINUED | OUTPATIENT
Start: 2021-04-19 | End: 2021-04-29

## 2021-04-19 RX ORDER — SODIUM CHLORIDE 9 MG/ML
1000 INJECTION INTRAMUSCULAR; INTRAVENOUS; SUBCUTANEOUS
Refills: 0 | Status: DISCONTINUED | OUTPATIENT
Start: 2021-04-19 | End: 2021-04-20

## 2021-04-19 RX ADMIN — CHLORHEXIDINE GLUCONATE 1 APPLICATION(S): 213 SOLUTION TOPICAL at 05:14

## 2021-04-19 RX ADMIN — SODIUM CHLORIDE 50 MILLILITER(S): 9 INJECTION INTRAMUSCULAR; INTRAVENOUS; SUBCUTANEOUS at 21:28

## 2021-04-19 RX ADMIN — Medication 6 MILLIGRAM(S): at 05:16

## 2021-04-19 RX ADMIN — TAMSULOSIN HYDROCHLORIDE 0.4 MILLIGRAM(S): 0.4 CAPSULE ORAL at 21:43

## 2021-04-19 RX ADMIN — Medication 10 MILLIGRAM(S): at 11:38

## 2021-04-19 RX ADMIN — PANTOPRAZOLE SODIUM 40 MILLIGRAM(S): 20 TABLET, DELAYED RELEASE ORAL at 05:16

## 2021-04-19 RX ADMIN — ATORVASTATIN CALCIUM 80 MILLIGRAM(S): 80 TABLET, FILM COATED ORAL at 21:43

## 2021-04-19 RX ADMIN — Medication 300 MILLIGRAM(S): at 12:44

## 2021-04-19 RX ADMIN — RANOLAZINE 1000 MILLIGRAM(S): 500 TABLET, FILM COATED, EXTENDED RELEASE ORAL at 05:16

## 2021-04-19 RX ADMIN — Medication 100 MILLIGRAM(S): at 05:15

## 2021-04-19 RX ADMIN — HEPARIN SODIUM 7500 UNIT(S): 5000 INJECTION INTRAVENOUS; SUBCUTANEOUS at 05:16

## 2021-04-19 RX ADMIN — Medication 100 MILLIGRAM(S): at 17:19

## 2021-04-19 RX ADMIN — Medication 81 MILLIGRAM(S): at 11:38

## 2021-04-19 RX ADMIN — Medication 600 MILLIGRAM(S): at 05:16

## 2021-04-19 RX ADMIN — Medication 5 MILLIGRAM(S): at 23:34

## 2021-04-19 RX ADMIN — RANOLAZINE 1000 MILLIGRAM(S): 500 TABLET, FILM COATED, EXTENDED RELEASE ORAL at 17:20

## 2021-04-19 RX ADMIN — HEPARIN SODIUM 7500 UNIT(S): 5000 INJECTION INTRAVENOUS; SUBCUTANEOUS at 17:20

## 2021-04-19 RX ADMIN — Medication 600 MILLIGRAM(S): at 17:20

## 2021-04-19 NOTE — CONSULT NOTE ADULT - SUBJECTIVE AND OBJECTIVE BOX
Patient is a 71y old  Male who presents with a chief complaint of COVID-19 (19 Apr 2021 09:56)      HPI:  Pt is a 71 year old male with PMHx of CAD s/p CABG ~10 years ago, HTN, BPH, HLD, CKD due to R sided renal disease? presents with SOB, cough, general, malaise, and decreased PO intake for past 4-5 days. Pt reports that family member tested positive [place] COVID 3 weeks ago so he got tested and was positive as well. Symptoms were very mild up until 4-5 days ago.  x 5 days. Pulse ox at home today was in the 80s so he came to ED. No headache, fever, nausea, vomiting,  leg pain/swelling, change in bowel habits or urinary symptoms.     In the ED: satting in 80s on RA with mild tachypnea. Vitals otherwise wnl.  CBC with lymphopenia  Cr 1.9, unknown baseline  D-dimer 320  Trop x1, BNP wnl (15 Apr 2021 00:03)      PAST MEDICAL & SURGICAL HISTORY:  CAD (coronary artery disease)  s/p CABG    High cholesterol    Chronic kidney disease, unspecified CKD stage    BPH (benign prostatic hyperplasia)    HTN (hypertension)    S/P CABG (coronary artery bypass graft)    Arm fracture, left  s/p repair        SOCIAL HX:   Smoking    quit 2003                     ETOH      denies                      Other    FAMILY HISTORY:  FH: prostate cancer (Father)    FH: uterine cancer (Mother)    .  No cardiovascular or pulmonary family history     REVIEW OF SYSTEMS:    All ROS are negative exept per HPI       Allergies    No Known Allergies    Intolerances          PHYSICAL EXAM  Vital Signs Last 24 Hrs  T(C): 36.4 (19 Apr 2021 06:29), Max: 36.4 (19 Apr 2021 06:29)  T(F): 97.6 (19 Apr 2021 06:29), Max: 97.6 (19 Apr 2021 06:29)  HR: 77 (19 Apr 2021 06:29) (70 - 77)  BP: 174/96 (19 Apr 2021 06:29) (110/66 - 174/96)  BP(mean): --  RR: 18 (19 Apr 2021 06:29) (18 - 20)  SpO2: 89% (19 Apr 2021 06:29) (89% - 100%)    CONSTITUTIONAL:  obese sitting in bed in  NAD    ENT:   Airway patent,   No thrush  on HFNC 60liter 70%    EYES:   Clear bilaterally,   pupils equal,   round and reactive to light.    CARDIAC:   Normal rate,   regular rhythm.    no edema      RESPIRATORY:   No wheezing   Normal chest expansion  Mildly tachypneic,  No use of accessory muscles  bibasilar crackles    GASTROINTESTINAL:  Abdomen soft, non-tender,   No guarding,   Positive BS    MUSCULOSKELETAL:   Range of motion is not limited,  No clubbing, cyanosis    NEUROLOGICAL:   Alert and oriented   No motor deficits.    SKIN:   Skin normal color for race,   No evidence of rash.      LABS:                          13.3   10.45 )-----------( 278      ( 19 Apr 2021 08:25 )             38.9                                               04-19    139  |  107  |  46<H>  ----------------------------<  99  5.0   |  19  |  1.4    Ca    8.2<L>      19 Apr 2021 08:25  Mg     2.8     04-19    TPro  6.3  /  Alb  3.0<L>  /  TBili  0.6  /  DBili  x   /  AST  59<H>  /  ALT  31  /  AlkPhos  113  04-19                                                                                           LIVER FUNCTIONS - ( 19 Apr 2021 08:25 )  Alb: 3.0 g/dL / Pro: 6.3 g/dL / ALK PHOS: 113 U/L / ALT: 31 U/L / AST: 59 U/L / GGT: x                                                                                                MEDICATIONS  (STANDING):  allopurinol 300 milliGRAM(s) Oral daily  aspirin enteric coated 81 milliGRAM(s) Oral daily  atorvastatin 80 milliGRAM(s) Oral at bedtime  chlorhexidine 4% Liquid 1 Application(s) Topical <User Schedule>  dexAMETHasone  Injectable 6 milliGRAM(s) IV Push daily  dicyclomine 10 milliGRAM(s) Oral daily  gemfibrozil 600 milliGRAM(s) Oral two times a day  heparin   Injectable 7500 Unit(s) SubCutaneous every 12 hours  metoprolol tartrate 100 milliGRAM(s) Oral two times a day  pantoprazole    Tablet 40 milliGRAM(s) Oral before breakfast  ranolazine 1000 milliGRAM(s) Oral two times a day  sodium chloride 0.9%. 1000 milliLiter(s) (50 mL/Hr) IV Continuous <Continuous>  tamsulosin 0.4 milliGRAM(s) Oral at bedtime    MEDICATIONS  (PRN):  acetaminophen   Tablet .. 650 milliGRAM(s) Oral every 6 hours PRN Temp greater or equal to 38C (100.4F), Moderate Pain (4 - 6)      X-Rays reviewed:    CXR interpreted by me: Patient is a 71y old  Male who presents with a chief complaint of SOB (19 Apr 2021 09:56)      HPI:  Pt is a 71 year old male with PMHx of CAD s/p CABG ~10 years ago, HTN, BPH, HLD, CKD  presents with SOB, cough, general, malaise, and decreased PO intake for past 4-5 days. Pt reports that family member tested positive fort COVID 3 weeks ago so he got tested and was positive as well. Symptoms were very mild up until 4-5 days ago.  x 5 days. Pulse ox at home today was in the 80s so he came to ED. No headache, fever, nausea, vomiting,  leg pain/swelling, change in bowel habits or urinary symptoms.     In the ED: satting in 80s on RA with mild tachypnea. Vitals otherwise wnl.  CBC with lymphopenia  Cr 1.9, unknown baseline  D-dimer 320  Trop x1, BNP wnl (15 Apr 2021 00:03) admitted to floor, seen by ID, wrorsening oxygenation, started on HHFNC called to evaluate      PAST MEDICAL & SURGICAL HISTORY:  CAD (coronary artery disease)  s/p CABG    High cholesterol    Chronic kidney disease, unspecified CKD stage    BPH (benign prostatic hyperplasia)    HTN (hypertension)    S/P CABG (coronary artery bypass graft)    Arm fracture, left  s/p repair        SOCIAL HX:   Smoking    quit 2003                     ETOH      denies                      Other    FAMILY HISTORY:  FH: prostate cancer (Father)    FH: uterine cancer (Mother)    .  No cardiovascular or pulmonary family history     REVIEW OF SYSTEMS:    All ROS are negative exept per HPI       Allergies    No Known Allergies    Intolerances          PHYSICAL EXAM  Vital Signs Last 24 Hrs  T(C): 36.4 (19 Apr 2021 06:29), Max: 36.4 (19 Apr 2021 06:29)  T(F): 97.6 (19 Apr 2021 06:29), Max: 97.6 (19 Apr 2021 06:29)  HR: 77 (19 Apr 2021 06:29) (70 - 77)  BP: 174/96 (19 Apr 2021 06:29) (110/66 - 174/96)  RR: 18 (19 Apr 2021 06:29) (18 - 20)  SpO2: 89% (19 Apr 2021 06:29) (89% - 100%)    CONSTITUTIONAL:  obese sitting in bed in  NAD    ENT:   Airway patent,   No thrush  on HFNC 60liter 70%    EYES:   Clear bilaterally,   pupils equal,   round and reactive to light.    CARDIAC:   GISSELL 3/6    RESPIRATORY:   No wheezing   Normal chest expansion  Mildly tachypneic,  No use of accessory muscles  bibasilar crackles    GASTROINTESTINAL:  Abdomen soft, non-tender,   No guarding,   Positive BS    MUSCULOSKELETAL:   Range of motion is not limited,  No clubbing, cyanosis    NEUROLOGICAL:   Alert and oriented   No motor deficits.    SKIN:   Skin normal color for race,   No evidence of rash.      LABS:                          13.3   10.45 )-----------( 278      ( 19 Apr 2021 08:25 )             38.9                                               04-19    139  |  107  |  46<H>  ----------------------------<  99  5.0   |  19  |  1.4    Ca    8.2<L>      19 Apr 2021 08:25  Mg     2.8     04-19    TPro  6.3  /  Alb  3.0<L>  /  TBili  0.6  /  DBili  x   /  AST  59<H>  /  ALT  31  /  AlkPhos  113  04-19                                                                                           LIVER FUNCTIONS - ( 19 Apr 2021 08:25 )  Alb: 3.0 g/dL / Pro: 6.3 g/dL / ALK PHOS: 113 U/L / ALT: 31 U/L / AST: 59 U/L / GGT: x                                                                                                MEDICATIONS  (STANDING):  allopurinol 300 milliGRAM(s) Oral daily  aspirin enteric coated 81 milliGRAM(s) Oral daily  atorvastatin 80 milliGRAM(s) Oral at bedtime  chlorhexidine 4% Liquid 1 Application(s) Topical <User Schedule>  dexAMETHasone  Injectable 6 milliGRAM(s) IV Push daily  dicyclomine 10 milliGRAM(s) Oral daily  gemfibrozil 600 milliGRAM(s) Oral two times a day  heparin   Injectable 7500 Unit(s) SubCutaneous every 12 hours  metoprolol tartrate 100 milliGRAM(s) Oral two times a day  pantoprazole    Tablet 40 milliGRAM(s) Oral before breakfast  ranolazine 1000 milliGRAM(s) Oral two times a day  sodium chloride 0.9%. 1000 milliLiter(s) (50 mL/Hr) IV Continuous <Continuous>  tamsulosin 0.4 milliGRAM(s) Oral at bedtime    MEDICATIONS  (PRN):  acetaminophen   Tablet .. 650 milliGRAM(s) Oral every 6 hours PRN Temp greater or equal to 38C (100.4F), Moderate Pain (4 - 6)      X-Rays reviewed:    CXR interpreted by me:

## 2021-04-19 NOTE — CHART NOTE - NSCHARTNOTEFT_GEN_A_CORE
Medicine Transfer Note    Transfer from: 3A  Transfer to: SDU      Floor COURSE:  Patient admitted for AHRF 2/2 COVID. CXR showing increasing bilateral opacities. Out of window for RDV. COVID ab positive. On day 6 of dexa. Patient desaturated upon ambulation 3/17 and increased from NC to 15L NRB. S/P tocilizumab 600mg 4/18. Continued to become more hypoxic requiring HFNC. Currently at 60LPM, FiO2 70%. Otherwise, on fluids for RUTH - improving. SDU per pulmonary.         ASSESSMENT & PLAN:   70 yo M with PMHx of CAD s/p CABG 10 yrs ago, HTN, BPH, HLD, CKD presents with worsening dyspnea, malaise, admitted for acute hypoxemic respiratory failure due to COVID.    #Acute hypoxic respiratory failure secondary to COVID-19 pneumonia  COVID-19 PCR positive ~3 weeks ago. Symptoms mild until 4-5 days worsening SOB prior to admission.   -Chest X-ray 4/17: slightly increased bilateral opacities  -Patient desaturated upon ambulation 4/17 placed on 15LPM NRB > worsening hypoxia now on HFNC 60LPM, 70% FiO2   -inflammatory markers:   --d-dimer 320 4/14 > 345 4/17 > 768 4/19  -- 4/14 > 131 4/17 > pending 4/19  --ferritin 1593 4/14 > pending 4/18, 4/19  --procalc 0.36 4/14 > 0.23 4/17 > pending 4/19  - ID following - out of window for RDV, c/w Dexamethasone 6mg qD (day 6), s/p toci 600mg 4/18  - COVID AB = positive, no role for plasma  -f/u pulm for further recommendations    #CKD with possible RUTH, unclear baseline Cr - improving  - Cr 1.9 on admission  - Pt sees Nephrologist on [place], unsure of name  - Renal US revealed L renal cyst, no other abnormalities   - Home Lisinopril, Lasix on hold for now  - Cr 4/17 1.5 > 1.4 4/19, c/w gentle hydration NS @50cc/hr  - monitor I's and O's    #CAD s/p CABG 10 yrs ago  #HLD  - C/w Aspirin 81mg qD, Metoprolol 100mg BID, Ranolazine 1000mg BID  - C/w Atorvastatin 80mg qHS, Gemfibrozil 600mg BID    #BPH  - C/w Flomax 0.4mg qHS    #HTN  - Home Lisinopril, Lasix on hold due to Cr elevation  - c/w metoprolol 100mg BID    DVT ppx: Heparin SubQ 7500U BID  GI ppx: pantoprazole 40mg qD  Labs: CMP+Mg, CBC  Diet: DASH/TLC  PT/Rehab: Evaluation pending  Activity: Ambulate as tolerated  Dispo: pending clinical improvement  FULL CODE      For Follow-Up:  -pulm and ID recommendations  -f/u inflammatory markers (collected 4/19)  -f/u AM CXR and ABG  -c/w HFNC           Vital Signs Last 24 Hrs  T(C): 36.4 (19 Apr 2021 06:29), Max: 36.4 (19 Apr 2021 06:29)  T(F): 97.6 (19 Apr 2021 06:29), Max: 97.6 (19 Apr 2021 06:29)  HR: 77 (19 Apr 2021 06:29) (70 - 77)  BP: 174/96 (19 Apr 2021 06:29) (110/66 - 174/96)  BP(mean): --  RR: 18 (19 Apr 2021 06:29) (18 - 20)  SpO2: 89% (19 Apr 2021 06:29) (89% - 100%)  I&O's Summary    19 Apr 2021 07:01  -  19 Apr 2021 10:55  --------------------------------------------------------  IN: 0 mL / OUT: 1 mL / NET: -1 mL          MEDICATIONS  (STANDING):  allopurinol 300 milliGRAM(s) Oral daily  aspirin enteric coated 81 milliGRAM(s) Oral daily  atorvastatin 80 milliGRAM(s) Oral at bedtime  chlorhexidine 4% Liquid 1 Application(s) Topical <User Schedule>  dexAMETHasone  Injectable 6 milliGRAM(s) IV Push daily  dicyclomine 10 milliGRAM(s) Oral daily  gemfibrozil 600 milliGRAM(s) Oral two times a day  heparin   Injectable 7500 Unit(s) SubCutaneous every 12 hours  metoprolol tartrate 100 milliGRAM(s) Oral two times a day  pantoprazole    Tablet 40 milliGRAM(s) Oral before breakfast  ranolazine 1000 milliGRAM(s) Oral two times a day  sodium chloride 0.9%. 1000 milliLiter(s) (50 mL/Hr) IV Continuous <Continuous>  tamsulosin 0.4 milliGRAM(s) Oral at bedtime    MEDICATIONS  (PRN):  acetaminophen   Tablet .. 650 milliGRAM(s) Oral every 6 hours PRN Temp greater or equal to 38C (100.4F), Moderate Pain (4 - 6)        LABS                                            13.3                  Neurophils% (auto):   85.3   (04-19 @ 08:25):    10.45)-----------(278          Lymphocytes% (auto):  8.0                                           38.9                   Eosinphils% (auto):   0.1      Manual%: Neutrophils x    ; Lymphocytes x    ; Eosinophils x    ; Bands%: x    ; Blasts x                                    139    |  107    |  46                  Calcium: 8.2   / iCa: x      (04-19 @ 08:25)    ----------------------------<  99        Magnesium: 2.8                              5.0     |  19     |  1.4              Phosphorous: x        TPro  6.3    /  Alb  3.0    /  TBili  0.6    /  DBili  x      /  AST  59     /  ALT  31     /  AlkPhos  113    19 Apr 2021 08:25

## 2021-04-19 NOTE — PROGRESS NOTE ADULT - ASSESSMENT
72 yo M with PMHx of CAD s/p CABG 10 yrs ago, HTN, BPH, HLD, CKD presents with worsening dyspnea, malaise, admitted for acute hypoxemic respiratory failure due to COVID.    #Acute hypoxic respiratory failure secondary to COVID-19 pneumonia  COVID-19 PCR positive ~3 weeks ago. Symptoms mild until 4-5 days worsening SOB prior to admission.   -Chest X-ray 4/17: slightly increased bilateral opacities  -Patient desaturated upon ambulation 4/17 placed on 15LPM NRB > worsening hypoxia now on HFNC 60LPM, 70% FiO2   -inflammatory markers:   --d-dimer 320 4/14 > 345 4/17 > pending 4/19  -- 4/14 > 131 4/17 > pending 4/19  --ferritin 1593 4/14 > pending 4/18, 4/19  --procalc 0.36 4/14 > 0.23 4/17 > pending 4/19  - ID following - out of window for RDV, c/w Dexamethasone 6mg qD (day 6), s/p toci 600mg 4/18  - COVID AB = positive, no role for plasma  -f/u pulm consult for further recommendations    #CKD with possible RUTH, unclear baseline Cr - improving  - Cr 1.9 on admission  - Pt sees Nephrologist on [place], unsure of name  - Renal US revealed L renal cyst, no other abnormalities   - Home Lisinopril, Lasix on hold for now  - Cr 4/17 1.5 > 1.4 4/19, c/w gentle hydration NS @50cc/hr  - monitor I's and O's    #CAD s/p CABG 10 yrs ago  #HLD  - C/w Aspirin 81mg qD, Metoprolol 100mg BID, Ranolazine 1000mg BID  - C/w Atorvastatin 80mg qHS, Gemfibrozil 600mg BID    #BPH  - C/w Flomax 0.4mg qHS    #HTN  - Home Lisinopril, Lasix on hold due to Cr elevation  - c/w metoprolol 100mg BID    DVT ppx: Heparin SubQ 7500U BID  GI ppx: pantoprazole 40mg qD  Labs: CMP+Mg, CBC  Diet: DASH/TLC  PT/Rehab: Evaluation pending  Activity: Ambulate as tolerated  Dispo: Home pending clinical improvement  FULL CODE   70 yo M with PMHx of CAD s/p CABG 10 yrs ago, HTN, BPH, HLD, CKD presents with worsening dyspnea, malaise, admitted for acute hypoxemic respiratory failure due to COVID.    #Acute hypoxic respiratory failure secondary to COVID-19 pneumonia  COVID-19 PCR positive ~3 weeks ago. Symptoms mild until 4-5 days worsening SOB prior to admission.   -Chest X-ray 4/17: slightly increased bilateral opacities  -Patient desaturated upon ambulation 4/17 placed on 15LPM NRB > worsening hypoxia now on HFNC 60LPM, 70% FiO2   -inflammatory markers:   --d-dimer 320 4/14 > 345 4/17 > pending 4/19  -- 4/14 > 131 4/17 > pending 4/19  --ferritin 1593 4/14 > pending 4/18, 4/19  --procalc 0.36 4/14 > 0.23 4/17 > pending 4/19  - ID following - out of window for RDV, c/w Dexamethasone 6mg qD (day 6), s/p toci 600mg 4/18  - COVID AB = positive, no role for plasma  -f/u pulm consult for further recommendations    #CKD with possible RUTH, unclear baseline Cr - improving  - Cr 1.9 on admission  - Pt sees Nephrologist on [place], unsure of name  - Renal US revealed L renal cyst, no other abnormalities   - Home Lisinopril, Lasix on hold for now  - Cr 4/17 1.5 > 1.4 4/19, c/w gentle hydration NS @50cc/hr  - monitor I's and O's    #CAD s/p CABG 10 yrs ago  #HLD  - C/w Aspirin 81mg qD, Metoprolol 100mg BID, Ranolazine 1000mg BID  - C/w Atorvastatin 80mg qHS, Gemfibrozil 600mg BID    #BPH  - C/w Flomax 0.4mg qHS    #HTN  - Home Lisinopril, Lasix on hold due to Cr elevation  - c/w metoprolol 100mg BID    DVT ppx: Heparin SubQ 7500U BID  GI ppx: pantoprazole 40mg qD  Labs: CMP+Mg, CBC  Diet: DASH/TLC  PT/Rehab: Evaluation pending  Activity: Ambulate as tolerated  Dispo: Home pending clinical improvement  FULL CODE  Contact: Brien (son) [phone]  Called Brien to give update on clinical status, left VM, will follow-up   70 yo M with PMHx of CAD s/p CABG 10 yrs ago, HTN, BPH, HLD, CKD presents with worsening dyspnea, malaise, admitted for acute hypoxemic respiratory failure due to COVID.    #Acute hypoxic respiratory failure secondary to COVID-19 pneumonia  COVID-19 PCR positive ~3 weeks ago. Symptoms mild until 4-5 days worsening SOB prior to admission.   -Chest X-ray 4/17: slightly increased bilateral opacities  -Patient desaturated upon ambulation 4/17 placed on 15LPM NRB > worsening hypoxia now on HFNC 60LPM, 70% FiO2   -inflammatory markers:   --d-dimer 320 4/14 > 345 4/17 > pending 4/19  -- 4/14 > 131 4/17 > pending 4/19  --ferritin 1593 4/14 > pending 4/18, 4/19  --procalc 0.36 4/14 > 0.23 4/17 > pending 4/19  - ID following - out of window for RDV, c/w Dexamethasone 6mg qD (day 6), s/p toci 600mg 4/18  - COVID AB = positive, no role for plasma  -f/u pulm consult for further recommendations    #CKD with possible RUTH, unclear baseline Cr - improving  - Cr 1.9 on admission  - Pt sees Nephrologist on [place], unsure of name  - Renal US revealed L renal cyst, no other abnormalities   - Home Lisinopril, Lasix on hold for now  - Cr 4/17 1.5 > 1.4 4/19, c/w gentle hydration NS @50cc/hr  - monitor I's and O's    #CAD s/p CABG 10 yrs ago  #HLD  - C/w Aspirin 81mg qD, Metoprolol 100mg BID, Ranolazine 1000mg BID  - C/w Atorvastatin 80mg qHS, Gemfibrozil 600mg BID    #BPH  - C/w Flomax 0.4mg qHS    #HTN  - Home Lisinopril, Lasix on hold due to Cr elevation  - c/w metoprolol 100mg BID    DVT ppx: Heparin SubQ 7500U BID  GI ppx: pantoprazole 40mg qD  Labs: CMP+Mg, CBC  Diet: DASH/TLC  PT/Rehab: Evaluation pending  Activity: Ambulate as tolerated  Dispo: Home pending clinical improvement  FULL CODE  Contact: Narda (daughter) [phone]  Called Narda to give update on clinical status, possible SDU upgrade

## 2021-04-19 NOTE — CONSULT NOTE ADULT - ASSESSMENT
IMPRESSION:  Acute hypoxemic respiratory failure  Severe COVID-19 PNA s/p TOCI  HO CAD s/p CABG  HO HTN    PLAN:    CNS: Avoid CNS depressants.     HEENT: Oral care    PULMONARY:  HOB @ 45 degrees.  Aspiration precautions. Target SpO2>92-96%, wean oxygen as tolerated. Continue decadron.     CARDIOVASCULAR: Avoid volume overload.      GI: GI prophylaxis. Feeding as tolerated.  Bowel regimen     RENAL: Follow up lytes. Correct as needed.     INFECTIOUS DISEASE: PanCx. Trend inflammatory markers. Patient out of window for RDV. FU ID.     HEMATOLOGICAL:  DVT prophylaxis    ENDOCRINE:  Follow up FS.     MUSCULOSKELETAL: bedrest    Dispo: SDU   IMPRESSION:    Acute hypoxemic respiratory failure on HHFNC 70%  Severe COVID-19 PNA s/p TOCI  HO CAD s/p CABG  HO HTN  Possible VIET    PLAN:    CNS: Avoid CNS depressants.     HEENT: Oral care    PULMONARY:  HOB @ 45 degrees.  Aspiration precautions. Target SpO2 92-96%, wean oxygen as tolerated. Continue decadron. HHFNC/ BIPAP at night    CARDIOVASCULAR: Avoid volume overload.  keep equal balance    GI: GI prophylaxis. Feeding as tolerated.  Bowel regimen     RENAL: Follow up lytes. Correct as needed.     INFECTIOUS DISEASE: PanCx. Trend inflammatory markers. FU ID.     HEMATOLOGICAL:  DVT prophylaxis, LE doppler, change heq sq to q 8h    ENDOCRINE:  Follow up FS.     MUSCULOSKELETAL: bedrest    Dispo: SDU

## 2021-04-20 LAB
ALBUMIN SERPL ELPH-MCNC: 2.9 G/DL — LOW (ref 3.5–5.2)
ALP SERPL-CCNC: 123 U/L — HIGH (ref 30–115)
ALT FLD-CCNC: 31 U/L — SIGNIFICANT CHANGE UP (ref 0–41)
ANION GAP SERPL CALC-SCNC: 12 MMOL/L — SIGNIFICANT CHANGE UP (ref 7–14)
AST SERPL-CCNC: 49 U/L — HIGH (ref 0–41)
BASOPHILS # BLD AUTO: 0.02 K/UL — SIGNIFICANT CHANGE UP (ref 0–0.2)
BASOPHILS NFR BLD AUTO: 0.2 % — SIGNIFICANT CHANGE UP (ref 0–1)
BILIRUB SERPL-MCNC: 0.7 MG/DL — SIGNIFICANT CHANGE UP (ref 0.2–1.2)
BUN SERPL-MCNC: 46 MG/DL — HIGH (ref 10–20)
CALCIUM SERPL-MCNC: 8.4 MG/DL — LOW (ref 8.5–10.1)
CHLORIDE SERPL-SCNC: 107 MMOL/L — SIGNIFICANT CHANGE UP (ref 98–110)
CO2 SERPL-SCNC: 20 MMOL/L — SIGNIFICANT CHANGE UP (ref 17–32)
CREAT SERPL-MCNC: 1.5 MG/DL — SIGNIFICANT CHANGE UP (ref 0.7–1.5)
CRP SERPL-MCNC: 126 MG/L — HIGH
EOSINOPHIL # BLD AUTO: 0.01 K/UL — SIGNIFICANT CHANGE UP (ref 0–0.7)
EOSINOPHIL NFR BLD AUTO: 0.1 % — SIGNIFICANT CHANGE UP (ref 0–8)
FERRITIN SERPL-MCNC: 2016 NG/ML — HIGH (ref 30–400)
GLUCOSE SERPL-MCNC: 135 MG/DL — HIGH (ref 70–99)
HCT VFR BLD CALC: 40.7 % — LOW (ref 42–52)
HGB BLD-MCNC: 13.7 G/DL — LOW (ref 14–18)
IMM GRANULOCYTES NFR BLD AUTO: 1.2 % — HIGH (ref 0.1–0.3)
LYMPHOCYTES # BLD AUTO: 0.58 K/UL — LOW (ref 1.2–3.4)
LYMPHOCYTES # BLD AUTO: 5.5 % — LOW (ref 20.5–51.1)
MAGNESIUM SERPL-MCNC: 2.8 MG/DL — HIGH (ref 1.8–2.4)
MCHC RBC-ENTMCNC: 31.3 PG — HIGH (ref 27–31)
MCHC RBC-ENTMCNC: 33.7 G/DL — SIGNIFICANT CHANGE UP (ref 32–37)
MCV RBC AUTO: 92.9 FL — SIGNIFICANT CHANGE UP (ref 80–94)
MONOCYTES # BLD AUTO: 0.16 K/UL — SIGNIFICANT CHANGE UP (ref 0.1–0.6)
MONOCYTES NFR BLD AUTO: 1.5 % — LOW (ref 1.7–9.3)
NEUTROPHILS # BLD AUTO: 9.68 K/UL — HIGH (ref 1.4–6.5)
NEUTROPHILS NFR BLD AUTO: 91.5 % — HIGH (ref 42.2–75.2)
NRBC # BLD: 0 /100 WBCS — SIGNIFICANT CHANGE UP (ref 0–0)
PLATELET # BLD AUTO: 273 K/UL — SIGNIFICANT CHANGE UP (ref 130–400)
POTASSIUM SERPL-MCNC: 5.4 MMOL/L — HIGH (ref 3.5–5)
POTASSIUM SERPL-SCNC: 5.4 MMOL/L — HIGH (ref 3.5–5)
PROCALCITONIN SERPL-MCNC: 0.1 NG/ML — SIGNIFICANT CHANGE UP (ref 0.02–0.1)
PROT SERPL-MCNC: 6.2 G/DL — SIGNIFICANT CHANGE UP (ref 6–8)
RBC # BLD: 4.38 M/UL — LOW (ref 4.7–6.1)
RBC # FLD: 14.4 % — SIGNIFICANT CHANGE UP (ref 11.5–14.5)
SODIUM SERPL-SCNC: 139 MMOL/L — SIGNIFICANT CHANGE UP (ref 135–146)
WBC # BLD: 10.58 K/UL — SIGNIFICANT CHANGE UP (ref 4.8–10.8)
WBC # FLD AUTO: 10.58 K/UL — SIGNIFICANT CHANGE UP (ref 4.8–10.8)

## 2021-04-20 PROCEDURE — 99233 SBSQ HOSP IP/OBS HIGH 50: CPT

## 2021-04-20 PROCEDURE — 71045 X-RAY EXAM CHEST 1 VIEW: CPT | Mod: 26

## 2021-04-20 PROCEDURE — 99232 SBSQ HOSP IP/OBS MODERATE 35: CPT

## 2021-04-20 RX ORDER — AMLODIPINE BESYLATE 2.5 MG/1
5 TABLET ORAL DAILY
Refills: 0 | Status: DISCONTINUED | OUTPATIENT
Start: 2021-04-20 | End: 2021-04-28

## 2021-04-20 RX ORDER — SODIUM ZIRCONIUM CYCLOSILICATE 10 G/10G
10 POWDER, FOR SUSPENSION ORAL ONCE
Refills: 0 | Status: COMPLETED | OUTPATIENT
Start: 2021-04-20 | End: 2021-04-20

## 2021-04-20 RX ORDER — SODIUM ZIRCONIUM CYCLOSILICATE 10 G/10G
5 POWDER, FOR SUSPENSION ORAL EVERY 12 HOURS
Refills: 0 | Status: COMPLETED | OUTPATIENT
Start: 2021-04-20 | End: 2021-04-21

## 2021-04-20 RX ADMIN — TAMSULOSIN HYDROCHLORIDE 0.4 MILLIGRAM(S): 0.4 CAPSULE ORAL at 21:03

## 2021-04-20 RX ADMIN — SODIUM ZIRCONIUM CYCLOSILICATE 10 GRAM(S): 10 POWDER, FOR SUSPENSION ORAL at 12:32

## 2021-04-20 RX ADMIN — RANOLAZINE 1000 MILLIGRAM(S): 500 TABLET, FILM COATED, EXTENDED RELEASE ORAL at 05:13

## 2021-04-20 RX ADMIN — Medication 100 MILLIGRAM(S): at 17:19

## 2021-04-20 RX ADMIN — Medication 6 MILLIGRAM(S): at 05:14

## 2021-04-20 RX ADMIN — Medication 81 MILLIGRAM(S): at 12:32

## 2021-04-20 RX ADMIN — Medication 10 MILLIGRAM(S): at 11:58

## 2021-04-20 RX ADMIN — AMLODIPINE BESYLATE 5 MILLIGRAM(S): 2.5 TABLET ORAL at 12:32

## 2021-04-20 RX ADMIN — Medication 300 MILLIGRAM(S): at 11:58

## 2021-04-20 RX ADMIN — CHLORHEXIDINE GLUCONATE 1 APPLICATION(S): 213 SOLUTION TOPICAL at 05:14

## 2021-04-20 RX ADMIN — HEPARIN SODIUM 7500 UNIT(S): 5000 INJECTION INTRAVENOUS; SUBCUTANEOUS at 17:18

## 2021-04-20 RX ADMIN — HEPARIN SODIUM 7500 UNIT(S): 5000 INJECTION INTRAVENOUS; SUBCUTANEOUS at 05:13

## 2021-04-20 RX ADMIN — SODIUM ZIRCONIUM CYCLOSILICATE 5 GRAM(S): 10 POWDER, FOR SUSPENSION ORAL at 14:56

## 2021-04-20 RX ADMIN — RANOLAZINE 1000 MILLIGRAM(S): 500 TABLET, FILM COATED, EXTENDED RELEASE ORAL at 18:27

## 2021-04-20 RX ADMIN — PANTOPRAZOLE SODIUM 40 MILLIGRAM(S): 20 TABLET, DELAYED RELEASE ORAL at 06:02

## 2021-04-20 RX ADMIN — Medication 5 MILLIGRAM(S): at 21:04

## 2021-04-20 RX ADMIN — ATORVASTATIN CALCIUM 80 MILLIGRAM(S): 80 TABLET, FILM COATED ORAL at 21:03

## 2021-04-20 RX ADMIN — Medication 600 MILLIGRAM(S): at 17:19

## 2021-04-20 RX ADMIN — Medication 600 MILLIGRAM(S): at 05:15

## 2021-04-20 RX ADMIN — Medication 100 MILLIGRAM(S): at 05:20

## 2021-04-20 NOTE — PROGRESS NOTE ADULT - ASSESSMENT
IMPRESSION:    Acute hypoxemic respiratory failure  Severe COVID-19 PNA s/p TOCI  HO CAD s/p CABG  HO HTN  Possible VIET    PLAN:    CNS: Avoid CNS depressants.     HEENT: Oral care    PULMONARY:  HOB @ 45 degrees.  Aspiration precautions. Wean o2 as tolerated.  Encoiurage NIV  during sleep.  Decadron D# 7     CARDIOVASCULAR: Avoid volume overload.     GI: GI prophylaxis. Feeding as tolerated.  Bowel regimen     RENAL: Follow up lytes. Correct as needed.     INFECTIOUS DISEASE:  ID note appreciated.  monitor off ABX for now     HEMATOLOGICAL:  DVT prophylaxis LMWH.      ENDOCRINE:  Follow up FS.     MUSCULOSKELETAL: OOB to chair     Dispo: SDU   IMPRESSION:    Acute hypoxemic respiratory failure  Severe COVID-19 PNA s/p TOCI  HO CAD s/p CABG  HO HTN  RUTH improving   Possible VIET    PLAN:    CNS: Avoid CNS depressants.     HEENT: Oral care    PULMONARY:  HOB @ 45 degrees.  Aspiration precautions. Wean o2 as tolerated.  Encoiurage NIV  during sleep.  Decadron D# 7     CARDIOVASCULAR: Avoid volume overload.     GI: GI prophylaxis. Feeding as tolerated.  Bowel regimen     RENAL: Follow up lytes. Correct as needed.     INFECTIOUS DISEASE:  ID note appreciated.  monitor off ABX for now     HEMATOLOGICAL:  DVT prophylaxis LMWH.      ENDOCRINE:  Follow up FS.     MUSCULOSKELETAL: OOB to chair     Dispo: SDU

## 2021-04-20 NOTE — PROGRESS NOTE ADULT - ATTENDING COMMENTS
Patient denies having dyspnea at rest, no fever, tolerating diet well.  a/p:  Pt is a 71 year old male with PMHx of CAD s/p CABG ~10 years ago, HTN, BPH, HLD, CKD ,  presents with SOB, cough, general, malaise, and decreased PO intake for past 4-5 days.     # Acute hypoxic resp. failure due to COVID-19, severe disease  - satting 96% on 3 L NC, most likely pt. is in late inflammatory state,  out of window for remdesivir,  started on course 6mg dexa for 10 days  - f/u inflammatory markers, maintain isolation  - f/u COVID ab for possible plasma  - d-dimer 320  - f/u ID     # CKD stage 3,   - Cr 1.7  -hold ace , monitor daily BMP, renal diet      # CAD s/p CABG ~10 years ago  - continue asa 81, metoprolol, Ranolazine    # HTN- controlled  - continue home metoprolol  - holding lasix and lisinopril     # BPH  - continue flomax    # HLD  - continue statin, gemfibrozil  - ezetimibe non formulary    # DVT proph.- Heparin subc. inj.      #Progress Note Handoff: monitor pulse ox, inflammatory markers   Family discussion: yes, communication team Disposition: Home___once medically stable
Patient is clinically worse today, dyspneic, hypoxic placed on HF oxygen tx.  a/p:  Pt is a 71 year old male with PMHx of CAD s/p CABG ~10 years ago, HTN, BPH, HLD, CKD ,  presents with SOB, cough, general, malaise, and decreased PO intake for past 4-5 days.     # Acute hypoxic resp. failure due to COVID-19, severe disease, cytokine storm  - desaturated placed on HF 40/60- sat  96% , most likely pt. is in late inflammatory state,  out of window for remdesivir  -continue  6mg dexa for 10 days  -elevated CRP, maintain isolation  -  COVID ab positive, as per ID - will give 1 dose of Toci today.   -with clinical deterioration- consult Pulm/CCM for upgrade to stepdown unit.  - d-dimer 320      # CKD stage 3, renal function slightly improved   - Cr 1.5  -hold ace , monitor daily BMP, renal diet      # CAD s/p CABG ~10 years ago  - continue asa 81, metoprolol, Ranolazine    # HTN- controlled  - continue home metoprolol  - holding lasix and lisinopril     # BPH  - continue flomax    # HLD  - continue statin, gemfibrozil  - ezetimibe non formulary    # DVT proph.- Heparin subc. inj.      #Progress Note Handoff: will give toci today, consulted Pulmonary team to upgrade pt. to stepdown unit.  Family discussion: yes, patient's son was updated of patient's medical condition and treatment plan. Disposition: Home___once medically stable.
Patient reports mild dyspnea at rest, remains on HF .  a/p:  Pt is a 71 year old male with PMHx of CAD s/p CABG ~10 years ago, HTN, BPH, HLD, CKD ,  presents with SOB, cough, general, malaise, and decreased PO intake for past 4-5 days.     # Acute hypoxic resp. failure due to COVID-19, severe disease, cytokine storm  - desaturated placed on HF 50/60- sat  95% , most likely pt. is in late inflammatory state,  out of window for remdesivir  -continue  6mg dexa for 10 days  -elevated CRP, Ferritin maintain isolation  -  COVID ab positive, as per ID - s/p  1 dose of Toci 4/18.   -with clinical deterioration- consulted Pulm/CCM - upgraded to stepdown unit from 4/19.  -f/up Pulmonary team for further rec.        # CKD stage 3, renal function slightly improved   - Cr 1.5  -hold ace , monitor daily BMP, renal diet      # CAD s/p CABG ~10 years ago  - continue asa 81, metoprolol, Ranolazine    # HTN- controlled  - continue home metoprolol  - holding lasix and lisinopril     # BPH  - continue flomax    # HLD  - continue statin, gemfibrozil  - ezetimibe non formulary    # DVT proph.- Heparin subc. inj.      #Progress Note Handoff: continue close pulse ox monitoring, medical management as above.   Family discussion: yes, medical team   Disposition: Home___once medically stable.
Patient has mild dyspnea at rest, remains on HF.  a/p:  Pt is a 71 year old male with PMHx of CAD s/p CABG ~10 years ago, HTN, BPH, HLD, CKD ,  presents with SOB, cough, general, malaise, and decreased PO intake for past 4-5 days.     # Acute hypoxic resp. failure due to COVID-19, severe disease, cytokine storm  - desaturated placed on HF 50/70- sat  90% , most likely pt. is in late inflammatory state,  out of window for remdesivir  -continue  6mg dexa for 10 days  -elevated CRP, maintain isolation  -  COVID ab positive, as per ID - s/p  1 dose of Toci 4/18.   -with clinical deterioration- consulted Pulm/CCM - approved for  upgrade to stepdown unit.        # CKD stage 3, renal function slightly improved   - Cr 1.5  -hold ace , monitor daily BMP, renal diet      # CAD s/p CABG ~10 years ago  - continue asa 81, metoprolol, Ranolazine    # HTN- controlled  - continue home metoprolol  - holding lasix and lisinopril     # BPH  - continue flomax    # HLD  - continue statin, gemfibrozil  - ezetimibe non formulary    # DVT proph.- Heparin subc. inj.      #Progress Note Handoff: as per  Pulmonary team patient is being  upgraded pt. to stepdown unit.  Family discussion: yes, communication team  Disposition: Home___once medically stable.

## 2021-04-21 LAB
ALBUMIN SERPL ELPH-MCNC: 3 G/DL — LOW (ref 3.5–5.2)
ALP SERPL-CCNC: 141 U/L — HIGH (ref 30–115)
ALT FLD-CCNC: 29 U/L — SIGNIFICANT CHANGE UP (ref 0–41)
ANION GAP SERPL CALC-SCNC: 13 MMOL/L — SIGNIFICANT CHANGE UP (ref 7–14)
AST SERPL-CCNC: 50 U/L — HIGH (ref 0–41)
BASOPHILS # BLD AUTO: 0.02 K/UL — SIGNIFICANT CHANGE UP (ref 0–0.2)
BASOPHILS NFR BLD AUTO: 0.2 % — SIGNIFICANT CHANGE UP (ref 0–1)
BILIRUB SERPL-MCNC: 0.9 MG/DL — SIGNIFICANT CHANGE UP (ref 0.2–1.2)
BUN SERPL-MCNC: 41 MG/DL — HIGH (ref 10–20)
CALCIUM SERPL-MCNC: 8.2 MG/DL — LOW (ref 8.5–10.1)
CHLORIDE SERPL-SCNC: 103 MMOL/L — SIGNIFICANT CHANGE UP (ref 98–110)
CO2 SERPL-SCNC: 19 MMOL/L — SIGNIFICANT CHANGE UP (ref 17–32)
CREAT SERPL-MCNC: 1.2 MG/DL — SIGNIFICANT CHANGE UP (ref 0.7–1.5)
EOSINOPHIL # BLD AUTO: 0.08 K/UL — SIGNIFICANT CHANGE UP (ref 0–0.7)
EOSINOPHIL NFR BLD AUTO: 0.8 % — SIGNIFICANT CHANGE UP (ref 0–8)
GLUCOSE SERPL-MCNC: 98 MG/DL — SIGNIFICANT CHANGE UP (ref 70–99)
HCT VFR BLD CALC: 39 % — LOW (ref 42–52)
HGB BLD-MCNC: 13.3 G/DL — LOW (ref 14–18)
IMM GRANULOCYTES NFR BLD AUTO: 1.5 % — HIGH (ref 0.1–0.3)
LYMPHOCYTES # BLD AUTO: 0.81 K/UL — LOW (ref 1.2–3.4)
LYMPHOCYTES # BLD AUTO: 8.2 % — LOW (ref 20.5–51.1)
MAGNESIUM SERPL-MCNC: 2.6 MG/DL — HIGH (ref 1.8–2.4)
MCHC RBC-ENTMCNC: 31.2 PG — HIGH (ref 27–31)
MCHC RBC-ENTMCNC: 34.1 G/DL — SIGNIFICANT CHANGE UP (ref 32–37)
MCV RBC AUTO: 91.5 FL — SIGNIFICANT CHANGE UP (ref 80–94)
MONOCYTES # BLD AUTO: 0.48 K/UL — SIGNIFICANT CHANGE UP (ref 0.1–0.6)
MONOCYTES NFR BLD AUTO: 4.9 % — SIGNIFICANT CHANGE UP (ref 1.7–9.3)
NEUTROPHILS # BLD AUTO: 8.29 K/UL — HIGH (ref 1.4–6.5)
NEUTROPHILS NFR BLD AUTO: 84.4 % — HIGH (ref 42.2–75.2)
NRBC # BLD: 0 /100 WBCS — SIGNIFICANT CHANGE UP (ref 0–0)
PLATELET # BLD AUTO: 343 K/UL — SIGNIFICANT CHANGE UP (ref 130–400)
POTASSIUM SERPL-MCNC: 5 MMOL/L — SIGNIFICANT CHANGE UP (ref 3.5–5)
POTASSIUM SERPL-SCNC: 5 MMOL/L — SIGNIFICANT CHANGE UP (ref 3.5–5)
PROT SERPL-MCNC: 6 G/DL — SIGNIFICANT CHANGE UP (ref 6–8)
RBC # BLD: 4.26 M/UL — LOW (ref 4.7–6.1)
RBC # FLD: 14.3 % — SIGNIFICANT CHANGE UP (ref 11.5–14.5)
SODIUM SERPL-SCNC: 135 MMOL/L — SIGNIFICANT CHANGE UP (ref 135–146)
WBC # BLD: 9.83 K/UL — SIGNIFICANT CHANGE UP (ref 4.8–10.8)
WBC # FLD AUTO: 9.83 K/UL — SIGNIFICANT CHANGE UP (ref 4.8–10.8)

## 2021-04-21 PROCEDURE — 99233 SBSQ HOSP IP/OBS HIGH 50: CPT

## 2021-04-21 PROCEDURE — 71045 X-RAY EXAM CHEST 1 VIEW: CPT | Mod: 26

## 2021-04-21 RX ADMIN — Medication 100 MILLIGRAM(S): at 17:33

## 2021-04-21 RX ADMIN — Medication 10 MILLIGRAM(S): at 11:04

## 2021-04-21 RX ADMIN — Medication 81 MILLIGRAM(S): at 11:03

## 2021-04-21 RX ADMIN — SODIUM ZIRCONIUM CYCLOSILICATE 5 GRAM(S): 10 POWDER, FOR SUSPENSION ORAL at 17:34

## 2021-04-21 RX ADMIN — Medication 100 MILLIGRAM(S): at 05:41

## 2021-04-21 RX ADMIN — Medication 5 MILLIGRAM(S): at 21:33

## 2021-04-21 RX ADMIN — RANOLAZINE 1000 MILLIGRAM(S): 500 TABLET, FILM COATED, EXTENDED RELEASE ORAL at 17:33

## 2021-04-21 RX ADMIN — CHLORHEXIDINE GLUCONATE 1 APPLICATION(S): 213 SOLUTION TOPICAL at 05:44

## 2021-04-21 RX ADMIN — RANOLAZINE 1000 MILLIGRAM(S): 500 TABLET, FILM COATED, EXTENDED RELEASE ORAL at 05:44

## 2021-04-21 RX ADMIN — HEPARIN SODIUM 7500 UNIT(S): 5000 INJECTION INTRAVENOUS; SUBCUTANEOUS at 05:43

## 2021-04-21 RX ADMIN — Medication 300 MILLIGRAM(S): at 11:03

## 2021-04-21 RX ADMIN — ATORVASTATIN CALCIUM 80 MILLIGRAM(S): 80 TABLET, FILM COATED ORAL at 21:33

## 2021-04-21 RX ADMIN — Medication 600 MILLIGRAM(S): at 05:41

## 2021-04-21 RX ADMIN — Medication 6 MILLIGRAM(S): at 05:41

## 2021-04-21 RX ADMIN — Medication 600 MILLIGRAM(S): at 17:33

## 2021-04-21 RX ADMIN — AMLODIPINE BESYLATE 5 MILLIGRAM(S): 2.5 TABLET ORAL at 05:41

## 2021-04-21 RX ADMIN — PANTOPRAZOLE SODIUM 40 MILLIGRAM(S): 20 TABLET, DELAYED RELEASE ORAL at 08:21

## 2021-04-21 RX ADMIN — TAMSULOSIN HYDROCHLORIDE 0.4 MILLIGRAM(S): 0.4 CAPSULE ORAL at 21:32

## 2021-04-21 RX ADMIN — HEPARIN SODIUM 7500 UNIT(S): 5000 INJECTION INTRAVENOUS; SUBCUTANEOUS at 17:33

## 2021-04-21 NOTE — PHYSICAL THERAPY INITIAL EVALUATION ADULT - PERTINENT HX OF CURRENT PROBLEM, REHAB EVAL
Pt tested positive for covid after family member at home tested positive.  Pt came to hospital after O2sat dropped into 80's as per home pulseox.

## 2021-04-21 NOTE — DIETITIAN INITIAL EVALUATION ADULT. - OTHER CALCULATIONS
1750-1955kcal/day (MSJ x1.1-1.2 d/t overweight BMI, limited activity at this time); est protein needs = 79-97g/day (0.9-1.1 g/kg CBW, reasons mentioned above & considering CKD); est fluid needs = per CEU team

## 2021-04-21 NOTE — DIETITIAN INITIAL EVALUATION ADULT. - OTHER INFO
Pt p/w worsening SOB, cough, malaise & decreased PO intake x4-5d PTA d/t COVID-19 diagnosed 3weeks PTA. Hospital course complicated by acute hypoxic respiratory failure 2/2 COVID-19 PNA--tolerating HFNC 50L/60; COVID Ab positive, out of window for RDV, s/p toci 4/18. CKD w. possible RUTH--improving. h/o CAD s/p CABG x10yrs PTA.

## 2021-04-21 NOTE — PHYSICAL THERAPY INITIAL EVALUATION ADULT - GAIT DEVIATIONS NOTED, PT EVAL
Oriented - self; Oriented - place; Oriented - time
decreased yenni/increased time in double stance/decreased velocity of limb motion/decreased step length/decreased stride length/increased stride width

## 2021-04-21 NOTE — DIETITIAN INITIAL EVALUATION ADULT. - CONTINUE CURRENT NUTRITION CARE PLAN
1. Add Ensure Enlive q24H. 2. Continue DASH/TLC diet order. 3. Check serum vitamin D-25-hydroxy & supplement PRN.

## 2021-04-21 NOTE — PROGRESS NOTE ADULT - ASSESSMENT
Pt is a 71 year old male with PMHx of CAD s/p CABG ~10 years ago, HTN, BPH, HLD, CKD ,  presents with SOB, cough, general, malaise, and decreased PO intake for 5 days prior to presentation. Currently in CEU, remains on high flow    Acute Hypoxic respiratory failure due to severe COVID 19 PNA  Cytokine storm  Transaminitis  currently on HFNC 50/60 saturating 91%  NIV at bedtime and PRN  s/p Toci 4/18, not a candidate for plasma or RDV  on Decadron 6mg Q24H, started 4/14  Ddimer 768, on Heparin 7500 Q12H  off abx, procal 0.1  taper o2 as tolerated, trend inflammatory markers    RUTH on CKD stage 3, improving  Cr 1.2 today, peaked at 1.9  continue with monitoring  holding ACE    CAD s/p CABG ~10 years ago  HTN  DLD  continue asa 81, metoprolol, Ranolazine  continue with statin, gemfibrozil, Ezetimibe nonformulary  holding lasix and lisinopril     #Progress Note Handoff  Pending (specify):   improvement in respiratory status  Family discussion: Plan of care discussed with patient, aware and agreeable   Disposition:  from home    Claudine Berry MD  s. [phone]

## 2021-04-21 NOTE — DIETITIAN INITIAL EVALUATION ADULT. - ORAL INTAKE PTA/DIET HISTORY
Unable to conduct face to face assessment d/t COVID-19 isolation precautions. Attempted to call pt room phone >6x but no response. Attempted to call pt daughter >3x but no response. unable to obtain nutrition hx.

## 2021-04-21 NOTE — PHYSICAL THERAPY INITIAL EVALUATION ADULT - GENERAL OBSERVATIONS, REHAB EVAL
Pt rec'd in bed awake, pleasant and cooperative w/o compl.  Pt was on the phone w/his son, who assisted in translating for obtaining info regarding home setting.  Pt racheal tx well and was left as received in No apparent distress.  O2sat ranged from 97-86% t/o session, and pt recovered quickly w/seated rest of 10-20 seconds.  +high flow O2 at 60%, +tele, +pulseox

## 2021-04-21 NOTE — DIETITIAN INITIAL EVALUATION ADULT. - ENERGY INTAKE
Fair (>50%) As per staff, PO intake fair. Consuming 30-75% meals since admit. Tolerating diet texture well. Attempted to call pt room phone multiple times but no response. Recs d/w LIP (Dr. Chen).

## 2021-04-22 LAB
ALBUMIN SERPL ELPH-MCNC: 3.2 G/DL — LOW (ref 3.5–5.2)
ALP SERPL-CCNC: 137 U/L — HIGH (ref 30–115)
ALT FLD-CCNC: 38 U/L — SIGNIFICANT CHANGE UP (ref 0–41)
ANION GAP SERPL CALC-SCNC: 11 MMOL/L — SIGNIFICANT CHANGE UP (ref 7–14)
ANION GAP SERPL CALC-SCNC: 14 MMOL/L — SIGNIFICANT CHANGE UP (ref 7–14)
ANISOCYTOSIS BLD QL: SLIGHT — SIGNIFICANT CHANGE UP
AST SERPL-CCNC: 62 U/L — HIGH (ref 0–41)
BASOPHILS # BLD AUTO: 0 K/UL — SIGNIFICANT CHANGE UP (ref 0–0.2)
BASOPHILS NFR BLD AUTO: 0 % — SIGNIFICANT CHANGE UP (ref 0–1)
BILIRUB SERPL-MCNC: 1.1 MG/DL — SIGNIFICANT CHANGE UP (ref 0.2–1.2)
BUN SERPL-MCNC: 44 MG/DL — HIGH (ref 10–20)
BUN SERPL-MCNC: 46 MG/DL — HIGH (ref 10–20)
CALCIUM SERPL-MCNC: 8.6 MG/DL — SIGNIFICANT CHANGE UP (ref 8.5–10.1)
CALCIUM SERPL-MCNC: 8.8 MG/DL — SIGNIFICANT CHANGE UP (ref 8.5–10.1)
CHLORIDE SERPL-SCNC: 100 MMOL/L — SIGNIFICANT CHANGE UP (ref 98–110)
CHLORIDE SERPL-SCNC: 100 MMOL/L — SIGNIFICANT CHANGE UP (ref 98–110)
CO2 SERPL-SCNC: 18 MMOL/L — SIGNIFICANT CHANGE UP (ref 17–32)
CO2 SERPL-SCNC: 20 MMOL/L — SIGNIFICANT CHANGE UP (ref 17–32)
CREAT SERPL-MCNC: 1.4 MG/DL — SIGNIFICANT CHANGE UP (ref 0.7–1.5)
CREAT SERPL-MCNC: 1.5 MG/DL — SIGNIFICANT CHANGE UP (ref 0.7–1.5)
D DIMER BLD IA.RAPID-MCNC: 3160 NG/ML DDU — HIGH (ref 0–230)
EOSINOPHIL # BLD AUTO: 0 K/UL — SIGNIFICANT CHANGE UP (ref 0–0.7)
EOSINOPHIL NFR BLD AUTO: 0 % — SIGNIFICANT CHANGE UP (ref 0–8)
GIANT PLATELETS BLD QL SMEAR: PRESENT — SIGNIFICANT CHANGE UP
GLUCOSE SERPL-MCNC: 127 MG/DL — HIGH (ref 70–99)
GLUCOSE SERPL-MCNC: 157 MG/DL — HIGH (ref 70–99)
HCT VFR BLD CALC: 40.4 % — LOW (ref 42–52)
HGB BLD-MCNC: 13.6 G/DL — LOW (ref 14–18)
LYMPHOCYTES # BLD AUTO: 0.07 K/UL — LOW (ref 1.2–3.4)
LYMPHOCYTES # BLD AUTO: 0.9 % — LOW (ref 20.5–51.1)
MACROCYTES BLD QL: SLIGHT — SIGNIFICANT CHANGE UP
MAGNESIUM SERPL-MCNC: 2.6 MG/DL — HIGH (ref 1.8–2.4)
MANUAL SMEAR VERIFICATION: SIGNIFICANT CHANGE UP
MCHC RBC-ENTMCNC: 31.3 PG — HIGH (ref 27–31)
MCHC RBC-ENTMCNC: 33.7 G/DL — SIGNIFICANT CHANGE UP (ref 32–37)
MCV RBC AUTO: 92.9 FL — SIGNIFICANT CHANGE UP (ref 80–94)
MONOCYTES # BLD AUTO: 0.07 K/UL — LOW (ref 0.1–0.6)
MONOCYTES NFR BLD AUTO: 0.9 % — LOW (ref 1.7–9.3)
NEUTROPHILS # BLD AUTO: 7.98 K/UL — HIGH (ref 1.4–6.5)
NEUTROPHILS NFR BLD AUTO: 96.5 % — HIGH (ref 42.2–75.2)
NRBC # BLD: 1 /100 — HIGH (ref 0–0)
NRBC # BLD: SIGNIFICANT CHANGE UP /100 WBCS (ref 0–0)
PLAT MORPH BLD: ABNORMAL
PLATELET # BLD AUTO: 329 K/UL — SIGNIFICANT CHANGE UP (ref 130–400)
POTASSIUM SERPL-MCNC: 5.9 MMOL/L — HIGH (ref 3.5–5)
POTASSIUM SERPL-MCNC: 6.1 MMOL/L — CRITICAL HIGH (ref 3.5–5)
POTASSIUM SERPL-SCNC: 5.9 MMOL/L — HIGH (ref 3.5–5)
POTASSIUM SERPL-SCNC: 6.1 MMOL/L — CRITICAL HIGH (ref 3.5–5)
PROT SERPL-MCNC: 6.3 G/DL — SIGNIFICANT CHANGE UP (ref 6–8)
RBC # BLD: 4.35 M/UL — LOW (ref 4.7–6.1)
RBC # FLD: 14 % — SIGNIFICANT CHANGE UP (ref 11.5–14.5)
RBC BLD AUTO: ABNORMAL
SODIUM SERPL-SCNC: 131 MMOL/L — LOW (ref 135–146)
SODIUM SERPL-SCNC: 132 MMOL/L — LOW (ref 135–146)
VARIANT LYMPHS # BLD: 1.7 % — SIGNIFICANT CHANGE UP (ref 0–5)
WBC # BLD: 8.27 K/UL — SIGNIFICANT CHANGE UP (ref 4.8–10.8)
WBC # FLD AUTO: 8.27 K/UL — SIGNIFICANT CHANGE UP (ref 4.8–10.8)

## 2021-04-22 PROCEDURE — 71045 X-RAY EXAM CHEST 1 VIEW: CPT | Mod: 26

## 2021-04-22 PROCEDURE — 93970 EXTREMITY STUDY: CPT | Mod: 26

## 2021-04-22 PROCEDURE — 93010 ELECTROCARDIOGRAM REPORT: CPT

## 2021-04-22 PROCEDURE — 99233 SBSQ HOSP IP/OBS HIGH 50: CPT

## 2021-04-22 RX ORDER — INSULIN HUMAN 100 [IU]/ML
10 INJECTION, SOLUTION SUBCUTANEOUS ONCE
Refills: 0 | Status: COMPLETED | OUTPATIENT
Start: 2021-04-22 | End: 2021-04-22

## 2021-04-22 RX ORDER — SODIUM ZIRCONIUM CYCLOSILICATE 10 G/10G
10 POWDER, FOR SUSPENSION ORAL EVERY 8 HOURS
Refills: 0 | Status: COMPLETED | OUTPATIENT
Start: 2021-04-22 | End: 2021-04-23

## 2021-04-22 RX ORDER — DEXTROSE 50 % IN WATER 50 %
50 SYRINGE (ML) INTRAVENOUS ONCE
Refills: 0 | Status: COMPLETED | OUTPATIENT
Start: 2021-04-22 | End: 2021-04-22

## 2021-04-22 RX ORDER — CALCIUM GLUCONATE 100 MG/ML
1 VIAL (ML) INTRAVENOUS ONCE
Refills: 0 | Status: COMPLETED | OUTPATIENT
Start: 2021-04-22 | End: 2021-04-22

## 2021-04-22 RX ADMIN — Medication 300 MILLIGRAM(S): at 11:00

## 2021-04-22 RX ADMIN — PANTOPRAZOLE SODIUM 40 MILLIGRAM(S): 20 TABLET, DELAYED RELEASE ORAL at 06:02

## 2021-04-22 RX ADMIN — Medication 100 MILLIGRAM(S): at 05:54

## 2021-04-22 RX ADMIN — Medication 10 MILLIGRAM(S): at 11:01

## 2021-04-22 RX ADMIN — HEPARIN SODIUM 7500 UNIT(S): 5000 INJECTION INTRAVENOUS; SUBCUTANEOUS at 05:55

## 2021-04-22 RX ADMIN — Medication 100 GRAM(S): at 12:02

## 2021-04-22 RX ADMIN — Medication 50 MILLILITER(S): at 21:36

## 2021-04-22 RX ADMIN — HEPARIN SODIUM 7500 UNIT(S): 5000 INJECTION INTRAVENOUS; SUBCUTANEOUS at 17:08

## 2021-04-22 RX ADMIN — SODIUM ZIRCONIUM CYCLOSILICATE 10 GRAM(S): 10 POWDER, FOR SUSPENSION ORAL at 21:37

## 2021-04-22 RX ADMIN — Medication 600 MILLIGRAM(S): at 05:54

## 2021-04-22 RX ADMIN — INSULIN HUMAN 10 UNIT(S): 100 INJECTION, SOLUTION SUBCUTANEOUS at 21:36

## 2021-04-22 RX ADMIN — SODIUM ZIRCONIUM CYCLOSILICATE 10 GRAM(S): 10 POWDER, FOR SUSPENSION ORAL at 11:42

## 2021-04-22 RX ADMIN — RANOLAZINE 1000 MILLIGRAM(S): 500 TABLET, FILM COATED, EXTENDED RELEASE ORAL at 05:53

## 2021-04-22 RX ADMIN — Medication 5 MILLIGRAM(S): at 21:37

## 2021-04-22 RX ADMIN — RANOLAZINE 1000 MILLIGRAM(S): 500 TABLET, FILM COATED, EXTENDED RELEASE ORAL at 17:06

## 2021-04-22 RX ADMIN — Medication 50 MILLILITER(S): at 12:02

## 2021-04-22 RX ADMIN — AMLODIPINE BESYLATE 5 MILLIGRAM(S): 2.5 TABLET ORAL at 05:53

## 2021-04-22 RX ADMIN — ATORVASTATIN CALCIUM 80 MILLIGRAM(S): 80 TABLET, FILM COATED ORAL at 21:37

## 2021-04-22 RX ADMIN — Medication 81 MILLIGRAM(S): at 11:00

## 2021-04-22 RX ADMIN — TAMSULOSIN HYDROCHLORIDE 0.4 MILLIGRAM(S): 0.4 CAPSULE ORAL at 21:37

## 2021-04-22 RX ADMIN — Medication 6 MILLIGRAM(S): at 05:54

## 2021-04-22 RX ADMIN — Medication 100 MILLIGRAM(S): at 17:06

## 2021-04-22 RX ADMIN — Medication 600 MILLIGRAM(S): at 17:07

## 2021-04-22 RX ADMIN — INSULIN HUMAN 10 UNIT(S): 100 INJECTION, SOLUTION SUBCUTANEOUS at 12:01

## 2021-04-22 NOTE — PROGRESS NOTE ADULT - ASSESSMENT
Pt is a 71 year old male with PMHx of CAD s/p CABG ~10 years ago, HTN, BPH, HLD, CKD ,  presents with SOB, cough, general, malaise, and decreased PO intake for 5 days prior to presentation. Currently in CEU, remains on high flow    Acute Hypoxic respiratory failure due to severe COVID 19 PNA  Cytokine storm  Transaminitis  currently on HFNC 40/40 saturating 98-99%  NIV at bedtime and PRN  s/p Toci 4/18, not a candidate for plasma or RDV  on Decadron 6mg Q24H, started 4/14  Ddimer 768---> 3100, on Heparin 7500 Q12H  CHECK LE DUPLEX  off abx, procal 0.1  taper o2 as tolerated, trend inflammatory markers  PT working with patient    RUTH on CKD stage 3, improving  Cr 1.2 today, peaked at 1.9  continue with monitoring  holding ACE    CAD s/p CABG ~10 years ago  HTN  DLD  continue asa 81, metoprolol, Ranolazine  continue with statin, gemfibrozil, Ezetimibe nonformulary  holding lasix and lisinopril     #Progress Note Handoff  Pending (specify):   improvement in respiratory status  Family discussion: Plan of care discussed with patient, aware and agreeable   Disposition:  from home    Claudine Berry MD  s. [phone]

## 2021-04-23 LAB
ALBUMIN SERPL ELPH-MCNC: 3.3 G/DL — LOW (ref 3.5–5.2)
ALP SERPL-CCNC: 123 U/L — HIGH (ref 30–115)
ALT FLD-CCNC: 43 U/L — HIGH (ref 0–41)
ANION GAP SERPL CALC-SCNC: 11 MMOL/L — SIGNIFICANT CHANGE UP (ref 7–14)
ANION GAP SERPL CALC-SCNC: 9 MMOL/L — SIGNIFICANT CHANGE UP (ref 7–14)
AST SERPL-CCNC: 75 U/L — HIGH (ref 0–41)
BASOPHILS # BLD AUTO: 0.03 K/UL — SIGNIFICANT CHANGE UP (ref 0–0.2)
BASOPHILS NFR BLD AUTO: 0.2 % — SIGNIFICANT CHANGE UP (ref 0–1)
BILIRUB SERPL-MCNC: 1 MG/DL — SIGNIFICANT CHANGE UP (ref 0.2–1.2)
BUN SERPL-MCNC: 43 MG/DL — HIGH (ref 10–20)
BUN SERPL-MCNC: 46 MG/DL — HIGH (ref 10–20)
CALCIUM SERPL-MCNC: 8.6 MG/DL — SIGNIFICANT CHANGE UP (ref 8.5–10.1)
CALCIUM SERPL-MCNC: 8.7 MG/DL — SIGNIFICANT CHANGE UP (ref 8.5–10.1)
CHLORIDE SERPL-SCNC: 102 MMOL/L — SIGNIFICANT CHANGE UP (ref 98–110)
CHLORIDE SERPL-SCNC: 103 MMOL/L — SIGNIFICANT CHANGE UP (ref 98–110)
CO2 SERPL-SCNC: 21 MMOL/L — SIGNIFICANT CHANGE UP (ref 17–32)
CO2 SERPL-SCNC: 23 MMOL/L — SIGNIFICANT CHANGE UP (ref 17–32)
CREAT SERPL-MCNC: 1.4 MG/DL — SIGNIFICANT CHANGE UP (ref 0.7–1.5)
CREAT SERPL-MCNC: 1.5 MG/DL — SIGNIFICANT CHANGE UP (ref 0.7–1.5)
CRP SERPL-MCNC: 25 MG/L — HIGH
EOSINOPHIL # BLD AUTO: 0.06 K/UL — SIGNIFICANT CHANGE UP (ref 0–0.7)
EOSINOPHIL NFR BLD AUTO: 0.4 % — SIGNIFICANT CHANGE UP (ref 0–8)
FERRITIN SERPL-MCNC: 1387 NG/ML — HIGH (ref 30–400)
GLUCOSE SERPL-MCNC: 60 MG/DL — LOW (ref 70–99)
GLUCOSE SERPL-MCNC: 74 MG/DL — SIGNIFICANT CHANGE UP (ref 70–99)
HCT VFR BLD CALC: 38.1 % — LOW (ref 42–52)
HGB BLD-MCNC: 12.7 G/DL — LOW (ref 14–18)
IMM GRANULOCYTES NFR BLD AUTO: 1.5 % — HIGH (ref 0.1–0.3)
LYMPHOCYTES # BLD AUTO: 0.92 K/UL — LOW (ref 1.2–3.4)
LYMPHOCYTES # BLD AUTO: 6.6 % — LOW (ref 20.5–51.1)
MAGNESIUM SERPL-MCNC: 2.7 MG/DL — HIGH (ref 1.8–2.4)
MCHC RBC-ENTMCNC: 31.1 PG — HIGH (ref 27–31)
MCHC RBC-ENTMCNC: 33.3 G/DL — SIGNIFICANT CHANGE UP (ref 32–37)
MCV RBC AUTO: 93.4 FL — SIGNIFICANT CHANGE UP (ref 80–94)
MONOCYTES # BLD AUTO: 0.71 K/UL — HIGH (ref 0.1–0.6)
MONOCYTES NFR BLD AUTO: 5.1 % — SIGNIFICANT CHANGE UP (ref 1.7–9.3)
NEUTROPHILS # BLD AUTO: 12.03 K/UL — HIGH (ref 1.4–6.5)
NEUTROPHILS NFR BLD AUTO: 86.2 % — HIGH (ref 42.2–75.2)
NRBC # BLD: 0 /100 WBCS — SIGNIFICANT CHANGE UP (ref 0–0)
PLATELET # BLD AUTO: 389 K/UL — SIGNIFICANT CHANGE UP (ref 130–400)
POTASSIUM SERPL-MCNC: 5.4 MMOL/L — HIGH (ref 3.5–5)
POTASSIUM SERPL-MCNC: 5.8 MMOL/L — HIGH (ref 3.5–5)
POTASSIUM SERPL-SCNC: 5.4 MMOL/L — HIGH (ref 3.5–5)
POTASSIUM SERPL-SCNC: 5.8 MMOL/L — HIGH (ref 3.5–5)
PROCALCITONIN SERPL-MCNC: 0.12 NG/ML — HIGH (ref 0.02–0.1)
PROT SERPL-MCNC: 6.3 G/DL — SIGNIFICANT CHANGE UP (ref 6–8)
RBC # BLD: 4.08 M/UL — LOW (ref 4.7–6.1)
RBC # FLD: 14.1 % — SIGNIFICANT CHANGE UP (ref 11.5–14.5)
SODIUM SERPL-SCNC: 132 MMOL/L — LOW (ref 135–146)
SODIUM SERPL-SCNC: 137 MMOL/L — SIGNIFICANT CHANGE UP (ref 135–146)
WBC # BLD: 13.96 K/UL — HIGH (ref 4.8–10.8)
WBC # FLD AUTO: 13.96 K/UL — HIGH (ref 4.8–10.8)

## 2021-04-23 PROCEDURE — 71250 CT THORAX DX C-: CPT | Mod: 26

## 2021-04-23 PROCEDURE — 74176 CT ABD & PELVIS W/O CONTRAST: CPT | Mod: 26

## 2021-04-23 PROCEDURE — 99233 SBSQ HOSP IP/OBS HIGH 50: CPT

## 2021-04-23 PROCEDURE — 99222 1ST HOSP IP/OBS MODERATE 55: CPT

## 2021-04-23 PROCEDURE — 99232 SBSQ HOSP IP/OBS MODERATE 35: CPT

## 2021-04-23 PROCEDURE — 71045 X-RAY EXAM CHEST 1 VIEW: CPT | Mod: 26

## 2021-04-23 PROCEDURE — 76775 US EXAM ABDO BACK WALL LIM: CPT | Mod: 26

## 2021-04-23 RX ORDER — INSULIN HUMAN 100 [IU]/ML
10 INJECTION, SOLUTION SUBCUTANEOUS ONCE
Refills: 0 | Status: DISCONTINUED | OUTPATIENT
Start: 2021-04-23 | End: 2021-04-29

## 2021-04-23 RX ORDER — SODIUM ZIRCONIUM CYCLOSILICATE 10 G/10G
10 POWDER, FOR SUSPENSION ORAL ONCE
Refills: 0 | Status: COMPLETED | OUTPATIENT
Start: 2021-04-23 | End: 2021-04-23

## 2021-04-23 RX ORDER — DEXTROSE 50 % IN WATER 50 %
50 SYRINGE (ML) INTRAVENOUS ONCE
Refills: 0 | Status: COMPLETED | OUTPATIENT
Start: 2021-04-23 | End: 2021-04-23

## 2021-04-23 RX ADMIN — Medication 300 MILLIGRAM(S): at 11:31

## 2021-04-23 RX ADMIN — HEPARIN SODIUM 7500 UNIT(S): 5000 INJECTION INTRAVENOUS; SUBCUTANEOUS at 06:22

## 2021-04-23 RX ADMIN — TAMSULOSIN HYDROCHLORIDE 0.4 MILLIGRAM(S): 0.4 CAPSULE ORAL at 22:05

## 2021-04-23 RX ADMIN — Medication 10 MILLIGRAM(S): at 11:31

## 2021-04-23 RX ADMIN — Medication 600 MILLIGRAM(S): at 06:21

## 2021-04-23 RX ADMIN — RANOLAZINE 1000 MILLIGRAM(S): 500 TABLET, FILM COATED, EXTENDED RELEASE ORAL at 17:02

## 2021-04-23 RX ADMIN — HEPARIN SODIUM 7500 UNIT(S): 5000 INJECTION INTRAVENOUS; SUBCUTANEOUS at 17:02

## 2021-04-23 RX ADMIN — Medication 6 MILLIGRAM(S): at 06:23

## 2021-04-23 RX ADMIN — Medication 100 MILLIGRAM(S): at 17:02

## 2021-04-23 RX ADMIN — SODIUM ZIRCONIUM CYCLOSILICATE 10 GRAM(S): 10 POWDER, FOR SUSPENSION ORAL at 08:03

## 2021-04-23 RX ADMIN — PANTOPRAZOLE SODIUM 40 MILLIGRAM(S): 20 TABLET, DELAYED RELEASE ORAL at 06:21

## 2021-04-23 RX ADMIN — Medication 50 MILLILITER(S): at 07:00

## 2021-04-23 RX ADMIN — Medication 600 MILLIGRAM(S): at 17:01

## 2021-04-23 RX ADMIN — Medication 100 MILLIGRAM(S): at 06:21

## 2021-04-23 RX ADMIN — Medication 5 MILLIGRAM(S): at 22:05

## 2021-04-23 RX ADMIN — SODIUM ZIRCONIUM CYCLOSILICATE 10 GRAM(S): 10 POWDER, FOR SUSPENSION ORAL at 11:31

## 2021-04-23 RX ADMIN — RANOLAZINE 1000 MILLIGRAM(S): 500 TABLET, FILM COATED, EXTENDED RELEASE ORAL at 06:21

## 2021-04-23 RX ADMIN — AMLODIPINE BESYLATE 5 MILLIGRAM(S): 2.5 TABLET ORAL at 06:22

## 2021-04-23 RX ADMIN — ATORVASTATIN CALCIUM 80 MILLIGRAM(S): 80 TABLET, FILM COATED ORAL at 22:05

## 2021-04-23 RX ADMIN — Medication 81 MILLIGRAM(S): at 11:31

## 2021-04-23 NOTE — CHART NOTE - NSCHARTNOTEFT_GEN_A_CORE
CEU DOWNGRADE NOTE:    71y Male transferred to floor from CEU    Patient is a 71y old Male who presents with a chief complaint of COVID-19 (23 Apr 2021 06:38)    The patient is currently admitted for the primary diagnosis of COVID-19      The patient was admitted to the unit for (2) Days.    The patient was never intubated and was on pressors.    Disposition: 3 A    Code Status: Full code    ICU COURSE OF EVENTS:  -------------------------------------------------------------------------------------------  pt presented to the CEU on HFNC 60/100, with gradual improvement of oxygen requirements, and transitioning to NC. Tolerating NC 6 L.   D-dimer increased while in here, Duplex done was negative. pt also had RUTH, that improved. CXR done showed Tortuous Abdominal aorta, ordered a CT chest non contrast for evaluation. Otherwise pt is stable.       -------------------------------------------------------------------------------------------    Current workup in progress:  70 yo M with PMHx of CAD s/p CABG 10 yrs ago, HTN, BPH, HLD, CKD presents with worsening dyspnea, malaise, admitted for acute hypoxemic respiratory failure due to COVID.    #Acute hypoxic respiratory failure secondary to COVID-19 pneumonia  COVID-19 PCR positive ~3 weeks ago. Symptoms mild until 4-5 days worsening SOB prior to admission.   -Chest X-ray 4/17: slightly increased bilateral opacities  -Patient desaturated upon ambulation 4/17 placed on 15LPM NRB >  now on HFNC 50LPM, 60% FiO2   -inflammatory markers:   --d-dimer 320 4/14 > 345 4/17 > 768> 3160  -- 4/14 > 131 4/17 >126  --ferritin 1593 4/14 > 2329 > 2016  --procalc 0.36 4/14 > 0.23 4/17 > 0.1  - ID following - out of window for RDV, c/w Dexamethasone 6mg qD for a total of 10 days end on 4/23 , s/p toci 600mg 4/18  - COVID AB = positive, no role for plasma  - Duplex Negative  - Will attempt to wean off oxygen.     #CKD with possible RUTH, unclear baseline Cr - improving  - Cr 1.9 on admission  - Pt sees Nephrologist on [place], unsure of name  - Renal US revealed L renal cyst, no other abnormalities   - Home Lisinopril, Lasix on hold for now  - Creatinine Trend: 1.5<--, 1.4<--, 1.3<--, 1.5<--, 1.7<--, 1.9<--  - monitor I's and O's    #Hyperkalemia:  - on Lokelma.   - received IV insulin and Dex.   - FU repeat Potassium today.     #Tortuous abdominal aorta on CXR:  - present in 2016, increased in size since then.   - Will get CT chest non contrast to evaluate.   - Consider Vascular surgery after result of CT chest.     #CAD s/p CABG 10 yrs ago  #HLD  - C/w Aspirin 81mg qD, Metoprolol 100mg BID, Ranolazine 1000mg BID  - C/w Atorvastatin 80mg qHS, Gemfibrozil 600mg BID    #BPH  - C/w Flomax 0.4mg qHS    #HTN  - Home Lisinopril, Lasix on hold due to Cr elevation  - c/w metoprolol 100mg BID    DVT ppx: Heparin SubQ 7500U BID  GI ppx: pantoprazole 40mg qD  Diet: DASH/TLC  PT/Rehab: Evaluation pending  Activity: Ambulate as tolerated  Dispo: Home pending clinical improvement  FULL CODE  Contact: Narda (daughter) [phone]      SIGN OUT AT 04-23-21 @ 08:36 GIVEN TO: CEU DOWNGRADE NOTE:    71y Male transferred to floor from CEU    Patient is a 71y old Male who presents with a chief complaint of COVID-19 (23 Apr 2021 06:38)    The patient is currently admitted for the primary diagnosis of COVID-19      The patient was admitted to the unit for (2) Days.    The patient was never intubated and was on pressors.    Disposition: 3 A    Code Status: Full code    ICU COURSE OF EVENTS:  -------------------------------------------------------------------------------------------  pt presented to the CEU on HFNC 60/100, with gradual improvement of oxygen requirements, and transitioning to NC. Tolerating NC 6 L.   D-dimer increased while in here, Duplex done was negative. pt also had RUTH, that improved. CXR done showed Tortuous Abdominal aorta, ordered a CT chest non contrast for evaluation. Otherwise pt is stable.       -------------------------------------------------------------------------------------------    Current workup in progress:  70 yo M with PMHx of CAD s/p CABG 10 yrs ago, HTN, BPH, HLD, CKD presents with worsening dyspnea, malaise, admitted for acute hypoxemic respiratory failure due to COVID.    #Acute hypoxic respiratory failure secondary to COVID-19 pneumonia  COVID-19 PCR positive ~3 weeks ago. Symptoms mild until 4-5 days worsening SOB prior to admission.   -Chest X-ray 4/17: slightly increased bilateral opacities  -Patient desaturated upon ambulation 4/17 placed on 15LPM NRB >  now on HFNC 50LPM, 60% FiO2   -inflammatory markers:   --d-dimer 320 4/14 > 345 4/17 > 768> 3160  -- 4/14 > 131 4/17 >126  --ferritin 1593 4/14 > 2329 > 2016  --procalc 0.36 4/14 > 0.23 4/17 > 0.1  - ID following - out of window for RDV, c/w Dexamethasone 6mg qD for a total of 10 days end on 4/23 , s/p toci 600mg 4/18  - COVID AB = positive, no role for plasma  - Duplex Negative  - Will attempt to wean off oxygen.     #CKD with possible RUTH, unclear baseline Cr - improving  - Cr 1.9 on admission  - Pt sees Nephrologist on [place], unsure of name  - Renal US revealed L renal cyst, no other abnormalities   - Home Lisinopril, Lasix on hold for now  - Creatinine Trend: 1.5<--, 1.4<--, 1.3<--, 1.5<--, 1.7<--, 1.9<--  - monitor I's and O's    #Hyperkalemia:  - on Lokelma.   - received IV insulin and Dex.   - FU repeat Potassium today.     #Tortuous abdominal aorta on CXR:  - present in 2016, increased in size since then.   - CT chest non contrast : < from: CT Abdomen and Pelvis No Cont (04.23.21 @ 10:08) >    Abdominal aortic aneurysm, maximally 4.9 cm at thediaphragmatic hiatus.    Displaced intimal calcifications are suspicious for aortic dissection, age-indeterminate but potentially chronic. Note limited evaluation of the vasculature on noncontrast imaging.    - Fu Vascular surgery Consult    #CAD s/p CABG 10 yrs ago  #HLD  - C/w Aspirin 81mg qD, Metoprolol 100mg BID, Ranolazine 1000mg BID  - C/w Atorvastatin 80mg qHS, Gemfibrozil 600mg BID    #BPH  - C/w Flomax 0.4mg qHS    #HTN  - Home Lisinopril, Lasix on hold due to Cr elevation  - c/w metoprolol 100mg BID    DVT ppx: Heparin SubQ 7500U BID  GI ppx: pantoprazole 40mg qD  Diet: DASH/TLC  PT/Rehab: Evaluation pending  Activity: Ambulate as tolerated  Dispo: Home pending clinical improvement  FULL CODE  Contact: Narda (daughter) [phone]      SIGN OUT AT 04-23-21 @ 08:36 GIVEN TO: CEU DOWNGRADE NOTE:    71y Male transferred to floor from CEU    Patient is a 71y old Male who presents with a chief complaint of COVID-19 (23 Apr 2021 06:38)    The patient is currently admitted for the primary diagnosis of COVID-19      The patient was admitted to the unit for (2) Days.    The patient was never intubated and was on pressors.    Disposition: 3 A    Code Status: Full code    ICU COURSE OF EVENTS:  -------------------------------------------------------------------------------------------  pt presented to the CEU on HFNC 60/100, with gradual improvement of oxygen requirements, and transitioning to NC. Tolerating NC 6 L.   D-dimer increased while in here, Duplex done was negative. pt also had RUTH, that improved. CXR done showed Tortuous Abdominal aorta, ordered a CT chest non contrast for evaluation. Otherwise pt is stable.       -------------------------------------------------------------------------------------------    Current workup in progress:  72 yo M with PMHx of CAD s/p CABG 10 yrs ago, HTN, BPH, HLD, CKD presents with worsening dyspnea, malaise, admitted for acute hypoxemic respiratory failure due to COVID.    #Acute hypoxic respiratory failure secondary to COVID-19 pneumonia  COVID-19 PCR positive ~3 weeks ago. Symptoms mild until 4-5 days worsening SOB prior to admission.   -Chest X-ray 4/17: slightly increased bilateral opacities  -Patient desaturated upon ambulation 4/17 placed on 15LPM NRB >  now on HFNC 50LPM, 60% FiO2   -inflammatory markers:   --d-dimer 320 4/14 > 345 4/17 > 768> 3160  -- 4/14 > 131 4/17 >126  --ferritin 1593 4/14 > 2329 > 2016  --procalc 0.36 4/14 > 0.23 4/17 > 0.1  - ID following - out of window for RDV, c/w Dexamethasone 6mg qD for a total of 10 days end on 4/23 , s/p toci 600mg 4/18  - COVID AB = positive, no role for plasma  - Duplex Negative  - Will attempt to wean off oxygen.     #CKD with possible RUTH, unclear baseline Cr - improving  - Cr 1.9 on admission  - Pt sees Nephrologist on [place], unsure of name  - Renal US revealed L renal cyst, no other abnormalities   - Home Lisinopril, Lasix on hold for now  - Creatinine Trend: 1.5<--, 1.4<--, 1.3<--, 1.5<--, 1.7<--, 1.9<--  - monitor I's and O's    #Hyperkalemia:  - on Lokelma.   - received IV insulin and Dex.   - FU repeat Potassium today.     #Tortuous abdominal aorta on CXR:  - present in 2016, increased in size since then.   - CT chest non contrast : < from: CT Abdomen and Pelvis No Cont (04.23.21 @ 10:08) >    Abdominal aortic aneurysm, maximally 4.9 cm at thediaphragmatic hiatus.    Displaced intimal calcifications are suspicious for aortic dissection, age-indeterminate but potentially chronic. Note limited evaluation of the vasculature on noncontrast imaging.    -  Vascular surgery Consult: AAA, Chest thoracic aneurysm.   - Requesting CTA.   -Given his RUTH, will consult Nephro for optimization before giving contrast.   - FU neprho consult( Dr Duarte)    #CAD s/p CABG 10 yrs ago  #HLD  - C/w Aspirin 81mg qD, Metoprolol 100mg BID, Ranolazine 1000mg BID  - C/w Atorvastatin 80mg qHS, Gemfibrozil 600mg BID    #BPH  - C/w Flomax 0.4mg qHS    #HTN  - Home Lisinopril, Lasix on hold due to Cr elevation  - c/w metoprolol 100mg BID    DVT ppx: Heparin SubQ 7500U BID  GI ppx: pantoprazole 40mg qD  Diet: DASH/TLC  PT/Rehab: Evaluation pending  Activity: Ambulate as tolerated  Dispo: Home pending clinical improvement  FULL CODE  Contact: Narda (daughter) [phone]      SIGN OUT AT 04-23-21 @ 08:36 GIVEN TO:

## 2021-04-23 NOTE — CONSULT NOTE ADULT - ASSESSMENT
71 year old male with PMHx of CAD s/p CABG ~10 years ago, HTN, BPH, HLD, CKD due to R sided renal disease? presents with SOB, cough, general, malaise, and decreased PO intake for past 4-5 days. Admitted with covid PNA  Vascular Surgery called for CT finding of large thoraco-abdominal aortic aneurysm    Recs:  - Please, obtain CTA Chest/abdomen/pelvis    Pt and his family notified of findings  Will follow    SPECTRA [id number]

## 2021-04-23 NOTE — PROGRESS NOTE ADULT - ASSESSMENT
Pt is a 71 year old male with PMHx of CAD s/p CABG ~10 years ago, HTN, BPH, HLD, CKD ,  presents with SOB, cough, general, malaise, and decreased PO intake for 5 days prior to presentation. Currently in CEU on 6L nasal cannula    Acute Hypoxic respiratory failure due to severe COVID 19 PNA  Cytokine storm  Transaminitis  currently on6L nasal cannula saturating 95%  NIV at bedtime and PRN  s/p Toci 4/18, not a candidate for plasma or RDV  on Decadron 6mg Q24H, started 4/14  Ddimer 768---> 3100, on Heparin 7500 Q12H  LE duplex negative for DVT  off abx, procal 0.1  taper o2 as tolerated, trend inflammatory markers  PT working with patient    Tortuous Aorta/Aneysmal dilatation on chest xray  worsening as compared to prior  Patient without symptoms  CT chest abdomen without contrast 4/23: Abdominal aortic aneurysm, maximally 4.9 cm at the diaphragmatic hiatus.  Displaced intimal calcifications are suspicious for aortic dissection, age-indeterminate but potentially chronic. Note limited evaluation of the vasculature on noncontrast imaging  Vascular eval requested, if contrast study needed, will need renal eval given CKD and recent RUTH    RUTH on CKD stage 3, improving  Cr 1.4 today, peaked at 1.9  continue with monitoring  holding ACE    CAD s/p CABG ~10 years ago  HTN  DLD  continue asa 81, metoprolol, Ranolazine  continue with statin, gemfibrozil, Ezetimibe nonformulary  holding lasix and lisinopril     #Progress Note Handoff  Pending (specify):   improvement in respiratory status, vascular eval, improvement in renal fxn   Family discussion: Plan of care discussed with patient, aware and agreeable   Disposition:  from home    Claudine Berry MD  s. [id number]

## 2021-04-23 NOTE — PROGRESS NOTE ADULT - ASSESSMENT
IMPRESSION:    Acute hypoxemic respiratory failure improving   Severe COVID-19 PNA s/p TOCI  HO CAD s/p CABG  HO HTN  RUTH  Possible VIET    PLAN:    CNS: Avoid CNS depressants.     HEENT: Oral care    PULMONARY:  HOB @ 45 degrees.  Aspiration precautions. Wean O2 as tolerated.  Encourage NIV  during sleep.  Decadron one more dose today      CARDIOVASCULAR: Avoid volume overload.     GI: GI prophylaxis. Feeding as tolerated.  Bowel regimen     RENAL: Follow up lytes. Correct as needed.     INFECTIOUS DISEASE:  ID note appreciated.  Monitor off ABX for now     HEMATOLOGICAL:  DVT prophylaxis LMWH.      ENDOCRINE:  Follow up FS.     MUSCULOSKELETAL: OOB to chair. PT OT     Dispo: Downgrade to med surg

## 2021-04-23 NOTE — CONSULT NOTE ADULT - SUBJECTIVE AND OBJECTIVE BOX
VASCULAR SURGERY CONSULT NOTE      HPI:  Pt is a 71 year old male with PMHx of CAD s/p CABG ~10 years ago, HTN, BPH, HLD, CKD due to R sided renal disease? presents with SOB, cough, general, malaise, and decreased PO intake for past 4-5 days. Pt reports that [place] member [place] positive [place] 3 [place] ago so he got tested and was positive as well. Symptoms were very mild up until 4-5 days ago.  x 5 days. Pulse ox at home today was in the 80s so he came to ED. No headache, fever, nausea, vomiting,  leg pain/swelling, change in bowel habits or urinary symptoms.     In the ED: satting in 80s on RA with mild tachypnea. Vitals otherwise wnl.  CBC with lymphopenia  Cr 1.9, unknown baseline  D-dimer 320  Trop x1, BNP wnl (15 Apr 2021 00:03)        PAST MEDICAL & SURGICAL HISTORY:  CAD (coronary artery disease)  s/p CABG    High cholesterol    Chronic kidney disease, unspecified CKD stage    BPH (benign prostatic hyperplasia)    HTN (hypertension)    S/P CABG (coronary artery bypass graft)    Arm fracture, left  s/p repair      No Known Allergies    Home Medications:  ALLOPURINOL  TAB 300M tab(s) orally once a day (15 Apr 2021 01:43)  aspirin 81 mg oral tablet: 1 tab(s) orally once a day (15 Apr 2021 01:43)  ATORVASTATIN 80 MG TABLET: TAKE 1 TABLET BY MOUTH EVERY DAY (15 Apr 2021 01:43)  DICYCLOMINE  CAP 10M cap(s) orally once a day (at bedtime) (15 Apr 2021 01:43)  EZETIMIBE    TAB 10M  orally once a day (15 Apr 2021 01:43)  FUROSEMIDE   TAB 20M tab(s) orally once a day (15 Apr 2021 01:43)  GEMFIBROZIL  TAB 600M tab(s) orally 2 times a day (15 Apr 2021 01:43)  LISINOPRIL   TAB 10M tab(s) orally once a day (15 Apr 2021 01:43)  METOPROL TAR TAB 100M tab(s) orally 2 times a day (15 Apr 2021 01:43)  paricalcitol 1 mcg oral capsule: 1 cap(s) orally once a day (15 Apr 2021 01:43)  RANOLAZINE   TAB 1000M tab(s) orally 2 times a day (15 Apr 2021 01:43)  TAMSULOSIN   CAP 0.4M cap(s) orally once a day (at bedtime) (15 Apr 2021 01:43)    No permtinent family history of PVD    REVIEW OF SYSTEMS:  GENERAL:                                         negative  SKIN:                                                 negative  OPTHALMOLOGIC:                          negative  ENMT:                                               negative  RESPIRATORY AND THORAX:   see HPI  CARDIOVASCULAR:                         negative  GASTROINTESTINAL:                       negative  NEPHROLOGY:                                  negative  MUSCULOSKELETAL:                       negative  NEUROLOGIC:                                   negative  PSYCHIATRIC:                                    negative  HEMATOLOGY/LYMPHATICS:         negative  ENDOCRINE:                                     negative  ALLERGIC/IMMUNOLOGIC:            negative    12 point ROS otherwise normal except as stated in HPI    PHYSICAL EXAM  Vital Signs Last 24 Hrs  T(C): 35.6 (2021 12:13), Max: 36.6 (2021 16:35)  T(F): 96 (2021 12:13), Max: 97.9 (2021 16:35)  HR: 80 (2021 12:13) (57 - 96)  BP: 130/72 (2021 12:13) (110/57 - 139/77)  BP(mean): 93 (2021 12:13) (78 - 93)  RR: 20 (2021 12:13) (20 - 20)  SpO2: 95% (2021 05:57) (93% - 98%)    Appearance: Normal	  HEENT:   Normal oral mucosa, PERRL, EOMI	  Neck: Supple, - JVD;   Cardiovascular: Normal S1 S2, No JVD, No murmurs,   Respiratory: Lungs clear to auscultation, No Rales, Rhonchi, Wheezing	  Gastrointestinal:  Soft, Non-tender, positive BS	  Skin: No rashes, No ecchymoses, No cyanosis  Extremities: Normal range of motion, No clubbing, cyanosis or edema  Neurologic: Non-focal  Psychiatry: A & O x 3, Mood & affect appropriate          MEDICATIONS:   MEDICATIONS  (STANDING):  allopurinol 300 milliGRAM(s) Oral daily  amLODIPine   Tablet 5 milliGRAM(s) Oral daily  aspirin enteric coated 81 milliGRAM(s) Oral daily  atorvastatin 80 milliGRAM(s) Oral at bedtime  chlorhexidine 4% Liquid 1 Application(s) Topical <User Schedule>  dicyclomine 10 milliGRAM(s) Oral daily  gemfibrozil 600 milliGRAM(s) Oral two times a day  heparin   Injectable 7500 Unit(s) SubCutaneous every 12 hours  insulin regular  human recombinant 10 Unit(s) IV Push once  melatonin 5 milliGRAM(s) Oral at bedtime  metoprolol tartrate 100 milliGRAM(s) Oral two times a day  pantoprazole    Tablet 40 milliGRAM(s) Oral before breakfast  ranolazine 1000 milliGRAM(s) Oral two times a day  tamsulosin 0.4 milliGRAM(s) Oral at bedtime    MEDICATIONS  (PRN):  acetaminophen   Tablet .. 650 milliGRAM(s) Oral every 6 hours PRN Temp greater or equal to 38C (100.4F), Moderate Pain (4 - 6)      LAB/STUDIES:                        12.7   13.96 )-----------( 389      ( 2021 04:30 )             38.1         137  |  103  |  43<H>  ----------------------------<  74  5.4<H>   |  23  |  1.4    Ca    8.7      2021 04:30  Mg     2.7         TPro  6.3  /  Alb  3.3<L>  /  TBili  1.0  /  DBili  x   /  AST  75<H>  /  ALT  43<H>  /  AlkPhos  123<H>        LIVER FUNCTIONS - ( 2021 04:30 )  Alb: 3.3 g/dL / Pro: 6.3 g/dL / ALK PHOS: 123 U/L / ALT: 43 U/L / AST: 75 U/L / GGT: x                   IMAGING:  < from: CT Abdomen and Pelvis No Cont (21 @ 10:08) >  Abdominal aortic aneurysm, maximally 4.9 cm at thediaphragmatic hiatus.    Displaced intimal calcifications are suspicious for aortic dissection, age-indeterminate but potentially chronic. Note limited evaluation of the vasculature on noncontrast imaging.    See separate CT chest report for intrathoracic findings.      CT chest has no official read

## 2021-04-24 LAB
ALBUMIN SERPL ELPH-MCNC: 3.2 G/DL — LOW (ref 3.5–5.2)
ALP SERPL-CCNC: 106 U/L — SIGNIFICANT CHANGE UP (ref 30–115)
ALT FLD-CCNC: 56 U/L — HIGH (ref 0–41)
ANION GAP SERPL CALC-SCNC: 11 MMOL/L — SIGNIFICANT CHANGE UP (ref 7–14)
AST SERPL-CCNC: 100 U/L — HIGH (ref 0–41)
BASOPHILS # BLD AUTO: 0.03 K/UL — SIGNIFICANT CHANGE UP (ref 0–0.2)
BASOPHILS NFR BLD AUTO: 0.2 % — SIGNIFICANT CHANGE UP (ref 0–1)
BILIRUB SERPL-MCNC: 1.1 MG/DL — SIGNIFICANT CHANGE UP (ref 0.2–1.2)
BUN SERPL-MCNC: 48 MG/DL — HIGH (ref 10–20)
CALCIUM SERPL-MCNC: 8.8 MG/DL — SIGNIFICANT CHANGE UP (ref 8.5–10.1)
CHLORIDE SERPL-SCNC: 100 MMOL/L — SIGNIFICANT CHANGE UP (ref 98–110)
CO2 SERPL-SCNC: 25 MMOL/L — SIGNIFICANT CHANGE UP (ref 17–32)
CREAT SERPL-MCNC: 1.5 MG/DL — SIGNIFICANT CHANGE UP (ref 0.7–1.5)
D DIMER BLD IA.RAPID-MCNC: 1779 NG/ML DDU — HIGH (ref 0–230)
EOSINOPHIL # BLD AUTO: 0.15 K/UL — SIGNIFICANT CHANGE UP (ref 0–0.7)
EOSINOPHIL NFR BLD AUTO: 1.1 % — SIGNIFICANT CHANGE UP (ref 0–8)
GLUCOSE SERPL-MCNC: 72 MG/DL — SIGNIFICANT CHANGE UP (ref 70–99)
HCT VFR BLD CALC: 38.5 % — LOW (ref 42–52)
HGB BLD-MCNC: 13 G/DL — LOW (ref 14–18)
IMM GRANULOCYTES NFR BLD AUTO: 1.6 % — HIGH (ref 0.1–0.3)
LYMPHOCYTES # BLD AUTO: 1.5 K/UL — SIGNIFICANT CHANGE UP (ref 1.2–3.4)
LYMPHOCYTES # BLD AUTO: 11 % — LOW (ref 20.5–51.1)
MAGNESIUM SERPL-MCNC: 2.6 MG/DL — HIGH (ref 1.8–2.4)
MCHC RBC-ENTMCNC: 31.8 PG — HIGH (ref 27–31)
MCHC RBC-ENTMCNC: 33.8 G/DL — SIGNIFICANT CHANGE UP (ref 32–37)
MCV RBC AUTO: 94.1 FL — HIGH (ref 80–94)
MONOCYTES # BLD AUTO: 0.81 K/UL — HIGH (ref 0.1–0.6)
MONOCYTES NFR BLD AUTO: 6 % — SIGNIFICANT CHANGE UP (ref 1.7–9.3)
NEUTROPHILS # BLD AUTO: 10.87 K/UL — HIGH (ref 1.4–6.5)
NEUTROPHILS NFR BLD AUTO: 80.1 % — HIGH (ref 42.2–75.2)
NRBC # BLD: 0 /100 WBCS — SIGNIFICANT CHANGE UP (ref 0–0)
PLATELET # BLD AUTO: 356 K/UL — SIGNIFICANT CHANGE UP (ref 130–400)
POTASSIUM SERPL-MCNC: 5.8 MMOL/L — HIGH (ref 3.5–5)
POTASSIUM SERPL-SCNC: 5.8 MMOL/L — HIGH (ref 3.5–5)
PROT SERPL-MCNC: 6.2 G/DL — SIGNIFICANT CHANGE UP (ref 6–8)
RBC # BLD: 4.09 M/UL — LOW (ref 4.7–6.1)
RBC # FLD: 14.2 % — SIGNIFICANT CHANGE UP (ref 11.5–14.5)
SODIUM SERPL-SCNC: 136 MMOL/L — SIGNIFICANT CHANGE UP (ref 135–146)
WBC # BLD: 13.58 K/UL — HIGH (ref 4.8–10.8)
WBC # FLD AUTO: 13.58 K/UL — HIGH (ref 4.8–10.8)

## 2021-04-24 PROCEDURE — 99233 SBSQ HOSP IP/OBS HIGH 50: CPT

## 2021-04-24 RX ORDER — SODIUM ZIRCONIUM CYCLOSILICATE 10 G/10G
10 POWDER, FOR SUSPENSION ORAL
Refills: 0 | Status: DISCONTINUED | OUTPATIENT
Start: 2021-04-24 | End: 2021-04-25

## 2021-04-24 RX ORDER — SODIUM CHLORIDE 9 MG/ML
1000 INJECTION INTRAMUSCULAR; INTRAVENOUS; SUBCUTANEOUS
Refills: 0 | Status: DISCONTINUED | OUTPATIENT
Start: 2021-04-24 | End: 2021-04-26

## 2021-04-24 RX ADMIN — PANTOPRAZOLE SODIUM 40 MILLIGRAM(S): 20 TABLET, DELAYED RELEASE ORAL at 06:27

## 2021-04-24 RX ADMIN — AMLODIPINE BESYLATE 5 MILLIGRAM(S): 2.5 TABLET ORAL at 06:26

## 2021-04-24 RX ADMIN — HEPARIN SODIUM 7500 UNIT(S): 5000 INJECTION INTRAVENOUS; SUBCUTANEOUS at 17:12

## 2021-04-24 RX ADMIN — Medication 600 MILLIGRAM(S): at 05:38

## 2021-04-24 RX ADMIN — RANOLAZINE 1000 MILLIGRAM(S): 500 TABLET, FILM COATED, EXTENDED RELEASE ORAL at 05:38

## 2021-04-24 RX ADMIN — SODIUM ZIRCONIUM CYCLOSILICATE 10 GRAM(S): 10 POWDER, FOR SUSPENSION ORAL at 11:59

## 2021-04-24 RX ADMIN — Medication 300 MILLIGRAM(S): at 11:58

## 2021-04-24 RX ADMIN — Medication 10 MILLIGRAM(S): at 11:57

## 2021-04-24 RX ADMIN — TAMSULOSIN HYDROCHLORIDE 0.4 MILLIGRAM(S): 0.4 CAPSULE ORAL at 21:35

## 2021-04-24 RX ADMIN — Medication 100 MILLIGRAM(S): at 05:38

## 2021-04-24 RX ADMIN — ATORVASTATIN CALCIUM 80 MILLIGRAM(S): 80 TABLET, FILM COATED ORAL at 21:35

## 2021-04-24 RX ADMIN — RANOLAZINE 1000 MILLIGRAM(S): 500 TABLET, FILM COATED, EXTENDED RELEASE ORAL at 17:11

## 2021-04-24 RX ADMIN — SODIUM CHLORIDE 75 MILLILITER(S): 9 INJECTION INTRAMUSCULAR; INTRAVENOUS; SUBCUTANEOUS at 13:56

## 2021-04-24 RX ADMIN — Medication 5 MILLIGRAM(S): at 21:35

## 2021-04-24 RX ADMIN — Medication 100 MILLIGRAM(S): at 17:11

## 2021-04-24 RX ADMIN — Medication 81 MILLIGRAM(S): at 11:59

## 2021-04-24 RX ADMIN — HEPARIN SODIUM 7500 UNIT(S): 5000 INJECTION INTRAVENOUS; SUBCUTANEOUS at 05:40

## 2021-04-24 RX ADMIN — SODIUM ZIRCONIUM CYCLOSILICATE 10 GRAM(S): 10 POWDER, FOR SUSPENSION ORAL at 17:11

## 2021-04-24 RX ADMIN — SODIUM CHLORIDE 75 MILLILITER(S): 9 INJECTION INTRAMUSCULAR; INTRAVENOUS; SUBCUTANEOUS at 19:49

## 2021-04-24 RX ADMIN — Medication 600 MILLIGRAM(S): at 17:12

## 2021-04-24 NOTE — CONSULT NOTE ADULT - SUBJECTIVE AND OBJECTIVE BOX
[facility] INITIAL CONSULT NOTE  --------------------------------------------------------------------------------  HPI:  Pt is a 71 year old male with PMHx of CAD s/p CABG ~10 years ago, HTN, BPH, HLD, CKD due to R sided renal disease? presents with SOB, cough, general, malaise, and decreased PO intake for past 4-5 days. Pt reports that family member tested positive [place] 3 weeks ago so he got tested and was positive as well. Symptoms were very mild x 5 days. Then pulse ox at home was in the 80s so he came to ED. Cr 1.9 on admission, unknown baseline. CT revealed thoracic aortic aneurysm, evaluated by vascular surgery. Needs CTA, concern for contrast nephropathy.    PAST HISTORY  --------------------------------------------------------------------------------  PAST MEDICAL & SURGICAL HISTORY:  CAD (coronary artery disease) s/p CABG  High cholesterol  Chronic kidney disease, unspecified CKD stage  BPH (benign prostatic hyperplasia)  HTN (hypertension)  S/P CABG (coronary artery bypass graft)  Arm fracture, left s/p repair    FAMILY HISTORY:  FH: prostate cancer (Father)  FH: uterine cancer (Mother)    SOCIAL HISTORY:  No current ETOH, tobacco, or illicit drug use    ALLERGIES & MEDICATIONS  --------------------------------------------------------------------------------  Allergies    No Known Allergies    Standing Inpatient Medications  allopurinol 300 milliGRAM(s) Oral daily  amLODIPine   Tablet 5 milliGRAM(s) Oral daily  aspirin enteric coated 81 milliGRAM(s) Oral daily  atorvastatin 80 milliGRAM(s) Oral at bedtime  chlorhexidine 4% Liquid 1 Application(s) Topical <User Schedule>  dicyclomine 10 milliGRAM(s) Oral daily  gemfibrozil 600 milliGRAM(s) Oral two times a day  heparin   Injectable 7500 Unit(s) SubCutaneous every 12 hours  insulin regular  human recombinant 10 Unit(s) IV Push once  melatonin 5 milliGRAM(s) Oral at bedtime  metoprolol tartrate 100 milliGRAM(s) Oral two times a day  pantoprazole    Tablet 40 milliGRAM(s) Oral before breakfast  ranolazine 1000 milliGRAM(s) Oral two times a day  sodium zirconium cyclosilicate 10 Gram(s) Oral two times a day  tamsulosin 0.4 milliGRAM(s) Oral at bedtime    PRN Inpatient Medications  acetaminophen   Tablet .. 650 milliGRAM(s) Oral every 6 hours PRN    REVIEW OF SYSTEMS  --------------------------------------------------------------------------------  Skin: No rashes  Head/Eyes/Ears/Mouth: No headache; No sinus pain/discomfort, sore throat  Respiratory: No wheezing, hemoptysis  CV: No chest pain, PND, orthopnea  GI: No abdominal pain, diarrhea, constipation, nausea, vomiting, melena, hematochezia  : No increased frequency, dysuria, hematuria, nocturia  All other systems were reviewed and are negative, except as noted.    VITALS/PHYSICAL EXAM  --------------------------------------------------------------------------------  T(C): 36.2 (04-24-21 @ 05:00), Max: 36.2 (04-24-21 @ 05:00)  HR: 84 (04-24-21 @ 05:00) (79 - 84)  BP: 131/67 (04-24-21 @ 05:00) (131/67 - 135/72)  RR: 20 (04-24-21 @ 06:30) (18 - 20)  SpO2: 94% (04-24-21 @ 07:57) (93% - 95%)  Height (cm): 167.6 (04-23-21 @ 19:50)  Weight (kg): 103 (04-23-21 @ 19:50)  BMI (kg/m2): 36.7 (04-23-21 @ 19:50)  BSA (m2): 2.11 (04-23-21 @ 19:50)    04-23-21 @ 07:01  -  04-24-21 @ 07:00  --------------------------------------------------------  IN: 320 mL / OUT: 450 mL / NET: -130 mL    Physical Exam:  	Gen: NAD  	Pulm:  B/L rales  	CV: RRR, S1S2  	Back: No CVA tenderness; no sacral edema  	Abd: +BS, soft, nontender/nondistended  	: No suprapubic tenderness  	LE: Warm,  no edema  	Neuro: AAO x3    LABS/STUDIES  --------------------------------------------------------------------------------              13.0   13.58 >-----------<  356      [04-24-21 @ 05:49]              38.5     136  |  100  |  48  ----------------------------<  72      [04-24-21 @ 05:49]  5.8   |  25  |  1.5        Ca     8.8     [04-24-21 @ 05:49]      Mg     2.6     [04-24-21 @ 05:49]    TPro  6.2  /  Alb  3.2  /  TBili  1.1  /  DBili  x   /  AST  100  /  ALT  56  /  AlkPhos  106  [04-24-21 @ 05:49]    Creatinine Trend:  SCr 1.5 [04-24 @ 05:49]  SCr 1.4 [04-23 @ 04:30]  SCr 1.5 [04-23 @ 00:30]  SCr 1.5 [04-22 @ 17:59]  SCr 1.4 [04-22 @ 09:10]    Ferritin 1387      [04-22-21 @ 09:10]

## 2021-04-24 NOTE — PROGRESS NOTE ADULT - ASSESSMENT
Pt is a 71 year old male with PMHx of CAD s/p CABG ~10 years ago, HTN, BPH, HLD, CKD ,  presents with SOB, cough, general, malaise, and decreased PO intake for 5 days prior to presentation. Currently in CEU on 6L nasal cannula    Acute Hypoxic respiratory failure due to severe COVID 19 PNA  Cytokine storm  Transaminitis  currently on 4L nasal cannula saturating 94%  NIV at bedtime and PRN  s/p Toci 4/18, not a candidate for plasma or RDV  on Decadron 6mg Q24H, started 4/14  Ddimer 768---> 3100--->1779, trending down  c/w Heparin 7500 Q12H  LE duplex 4/22 negative for DVT  off abx, procal 0.1  taper o2 as tolerated, trend inflammatory markers  PT working with patient    Tortuous Aorta/Aneysmal dilatation on chest xray  worsening as compared to prior  Patient without symptoms  CT chest abdomen without contrast 4/23: 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 Abdominal aortic aneurysm, maximally 4.9 cm at the diaphragmatic hiatus.  Displaced intimal calcifications are suspicious for aortic dissection, age-indeterminate but potentially chronic. Note limited evaluation of the vasculature on noncontrast imaging  Vascular eval appreciated, needs CTA chest/A/P  pending renal eval given CKD recent RUTH and now need for contrast    RUTH on CKD stage 3  Hyperkalemia  Cr 1.5 today, peaked at 1.9 and K is 5.8  Lokelma 10g Q12H for now, c/w monitoring  holding ACE  renal eval pending    CAD s/p CABG ~10 years ago  HTN  DLD  continue asa 81, metoprolol, Ranolazine  continue with statin, gemfibrozil, Ezetimibe nonformulary  holding lasix and lisinopril   Cardiology following     #Progress Note Handoff  Pending (specify):   improvement in respiratory status, renal eval, then CTA and vascular f/u post imaging  Family discussion: Plan of care discussed with patient, aware and agreeable   Disposition:  from home    Claudine Berry MD  s. [phone]

## 2021-04-24 NOTE — CONSULT NOTE ADULT - ASSESSMENT
1. Acute Hypoxic respiratory failure due to severe COVID 19 PNA  2. RUTH, creatinine decreased  3. CKD III, patient claims to have atrophic R kidney, no evidence of that on current renal sono  4. Thoracic aortic aneurysm. Worsening as compared to prior. Plan for CTA.  5. CAD s/p CABG ~10 years ago  6. HTN  7. Hyperkalemia    Plan:    Patient understands risk of NADIRA and wishes to proceed with CTA  Start IVF NS@75mL/hr 12 hours pre/post CTA  Renal diet  Continue [facility]  Check UA, urine protein creatinine ratio

## 2021-04-25 LAB
ALBUMIN SERPL ELPH-MCNC: 2.8 G/DL — LOW (ref 3.5–5.2)
ALP SERPL-CCNC: 94 U/L — SIGNIFICANT CHANGE UP (ref 30–115)
ALT FLD-CCNC: 55 U/L — HIGH (ref 0–41)
ANION GAP SERPL CALC-SCNC: 10 MMOL/L — SIGNIFICANT CHANGE UP (ref 7–14)
AST SERPL-CCNC: 112 U/L — HIGH (ref 0–41)
BASOPHILS # BLD AUTO: 0.03 K/UL — SIGNIFICANT CHANGE UP (ref 0–0.2)
BASOPHILS NFR BLD AUTO: 0.3 % — SIGNIFICANT CHANGE UP (ref 0–1)
BILIRUB SERPL-MCNC: 0.8 MG/DL — SIGNIFICANT CHANGE UP (ref 0.2–1.2)
BUN SERPL-MCNC: 37 MG/DL — HIGH (ref 10–20)
CALCIUM SERPL-MCNC: 8 MG/DL — LOW (ref 8.5–10.1)
CHLORIDE SERPL-SCNC: 103 MMOL/L — SIGNIFICANT CHANGE UP (ref 98–110)
CO2 SERPL-SCNC: 20 MMOL/L — SIGNIFICANT CHANGE UP (ref 17–32)
CREAT SERPL-MCNC: 1.1 MG/DL — SIGNIFICANT CHANGE UP (ref 0.7–1.5)
CRP SERPL-MCNC: 11 MG/L — HIGH
EOSINOPHIL # BLD AUTO: 0.16 K/UL — SIGNIFICANT CHANGE UP (ref 0–0.7)
EOSINOPHIL NFR BLD AUTO: 1.7 % — SIGNIFICANT CHANGE UP (ref 0–8)
GLUCOSE SERPL-MCNC: 87 MG/DL — SIGNIFICANT CHANGE UP (ref 70–99)
HCT VFR BLD CALC: 35 % — LOW (ref 42–52)
HGB BLD-MCNC: 11.6 G/DL — LOW (ref 14–18)
IMM GRANULOCYTES NFR BLD AUTO: 2.5 % — HIGH (ref 0.1–0.3)
LYMPHOCYTES # BLD AUTO: 0.98 K/UL — LOW (ref 1.2–3.4)
LYMPHOCYTES # BLD AUTO: 10.1 % — LOW (ref 20.5–51.1)
MAGNESIUM SERPL-MCNC: 2.3 MG/DL — SIGNIFICANT CHANGE UP (ref 1.8–2.4)
MCHC RBC-ENTMCNC: 31.4 PG — HIGH (ref 27–31)
MCHC RBC-ENTMCNC: 33.1 G/DL — SIGNIFICANT CHANGE UP (ref 32–37)
MCV RBC AUTO: 94.6 FL — HIGH (ref 80–94)
MONOCYTES # BLD AUTO: 0.6 K/UL — SIGNIFICANT CHANGE UP (ref 0.1–0.6)
MONOCYTES NFR BLD AUTO: 6.2 % — SIGNIFICANT CHANGE UP (ref 1.7–9.3)
NEUTROPHILS # BLD AUTO: 7.66 K/UL — HIGH (ref 1.4–6.5)
NEUTROPHILS NFR BLD AUTO: 79.2 % — HIGH (ref 42.2–75.2)
NRBC # BLD: 0 /100 WBCS — SIGNIFICANT CHANGE UP (ref 0–0)
PLATELET # BLD AUTO: 293 K/UL — SIGNIFICANT CHANGE UP (ref 130–400)
POTASSIUM SERPL-MCNC: 4.8 MMOL/L — SIGNIFICANT CHANGE UP (ref 3.5–5)
POTASSIUM SERPL-SCNC: 4.8 MMOL/L — SIGNIFICANT CHANGE UP (ref 3.5–5)
PROCALCITONIN SERPL-MCNC: 0.05 NG/ML — SIGNIFICANT CHANGE UP (ref 0.02–0.1)
PROT SERPL-MCNC: 5.2 G/DL — LOW (ref 6–8)
RBC # BLD: 3.7 M/UL — LOW (ref 4.7–6.1)
RBC # FLD: 14.3 % — SIGNIFICANT CHANGE UP (ref 11.5–14.5)
SODIUM SERPL-SCNC: 133 MMOL/L — LOW (ref 135–146)
WBC # BLD: 9.67 K/UL — SIGNIFICANT CHANGE UP (ref 4.8–10.8)
WBC # FLD AUTO: 9.67 K/UL — SIGNIFICANT CHANGE UP (ref 4.8–10.8)

## 2021-04-25 PROCEDURE — 99233 SBSQ HOSP IP/OBS HIGH 50: CPT

## 2021-04-25 PROCEDURE — 71275 CT ANGIOGRAPHY CHEST: CPT | Mod: 26

## 2021-04-25 PROCEDURE — 74174 CTA ABD&PLVS W/CONTRAST: CPT | Mod: 26

## 2021-04-25 RX ADMIN — RANOLAZINE 1000 MILLIGRAM(S): 500 TABLET, FILM COATED, EXTENDED RELEASE ORAL at 17:16

## 2021-04-25 RX ADMIN — PANTOPRAZOLE SODIUM 40 MILLIGRAM(S): 20 TABLET, DELAYED RELEASE ORAL at 05:21

## 2021-04-25 RX ADMIN — Medication 10 MILLIGRAM(S): at 11:03

## 2021-04-25 RX ADMIN — Medication 600 MILLIGRAM(S): at 17:16

## 2021-04-25 RX ADMIN — ATORVASTATIN CALCIUM 80 MILLIGRAM(S): 80 TABLET, FILM COATED ORAL at 21:25

## 2021-04-25 RX ADMIN — HEPARIN SODIUM 7500 UNIT(S): 5000 INJECTION INTRAVENOUS; SUBCUTANEOUS at 17:17

## 2021-04-25 RX ADMIN — RANOLAZINE 1000 MILLIGRAM(S): 500 TABLET, FILM COATED, EXTENDED RELEASE ORAL at 05:21

## 2021-04-25 RX ADMIN — Medication 600 MILLIGRAM(S): at 05:21

## 2021-04-25 RX ADMIN — SODIUM ZIRCONIUM CYCLOSILICATE 10 GRAM(S): 10 POWDER, FOR SUSPENSION ORAL at 05:21

## 2021-04-25 RX ADMIN — SODIUM CHLORIDE 75 MILLILITER(S): 9 INJECTION INTRAMUSCULAR; INTRAVENOUS; SUBCUTANEOUS at 16:14

## 2021-04-25 RX ADMIN — SODIUM CHLORIDE 75 MILLILITER(S): 9 INJECTION INTRAMUSCULAR; INTRAVENOUS; SUBCUTANEOUS at 19:25

## 2021-04-25 RX ADMIN — HEPARIN SODIUM 7500 UNIT(S): 5000 INJECTION INTRAVENOUS; SUBCUTANEOUS at 05:21

## 2021-04-25 RX ADMIN — Medication 300 MILLIGRAM(S): at 11:03

## 2021-04-25 RX ADMIN — Medication 100 MILLIGRAM(S): at 05:21

## 2021-04-25 RX ADMIN — Medication 5 MILLIGRAM(S): at 21:25

## 2021-04-25 RX ADMIN — Medication 81 MILLIGRAM(S): at 11:03

## 2021-04-25 RX ADMIN — AMLODIPINE BESYLATE 5 MILLIGRAM(S): 2.5 TABLET ORAL at 05:21

## 2021-04-25 RX ADMIN — TAMSULOSIN HYDROCHLORIDE 0.4 MILLIGRAM(S): 0.4 CAPSULE ORAL at 21:25

## 2021-04-25 RX ADMIN — Medication 100 MILLIGRAM(S): at 17:16

## 2021-04-25 NOTE — PROGRESS NOTE ADULT - ASSESSMENT
1. Acute Hypoxic respiratory failure due to severe COVID 19 PNA  2. RUTH, creatinine decreased  3. CKD III, patient claims to have atrophic R kidney, no evidence of that on current renal sono  4. Thoracic aortic aneurysm. Worsening as compared to prior. Plan for CTA.  5. CAD s/p CABG ~10 years ago  6. HTN  7. Hyperkalemia    Plan:    Patient understands risk of NADIRA and wishes to proceed with CTA  Start IVF NS@75mL/hr 12 hours pre/post CTA  Stop Lokelma  Renal diet  Resume Lasix tomorrow  Check UA, urine protein creatinine ratio

## 2021-04-25 NOTE — PROGRESS NOTE ADULT - ASSESSMENT
Pt is a 71 year old male with PMHx of CAD s/p CABG ~10 years ago, HTN, BPH, HLD, CKD ,  presents with SOB, cough, general, malaise, and decreased PO intake for 5 days prior to presentation. Currently in CEU on 6L nasal cannula    Acute Hypoxic respiratory failure due to severe COVID 19 PNA  Cytokine storm  Transaminitis  currently on 3L nasal cannula saturating 96%  NIV at bedtime and PRN  s/p Toci 4/18, not a candidate for plasma or RDV  s/p 10 day course of decadron  Ddimer 768---> 3100--->1779, trending down  c/w Heparin 7500 Q12H  LE duplex 4/22 negative for DVT  off abx, procal 0.1  taper o2 as tolerated, trend inflammatory markers  PT working with patient    Tortuous Aorta/Aneysmal dilatation on chest xray  worsening as compared to prior  Patient without symptoms  CT chest abdomen without contrast 4/23: 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 Abdominal aortic aneurysm, maximally 4.9 cm at the diaphragmatic hiatus.  Displaced intimal calcifications are suspicious for aortic dissection, age-indeterminate but potentially chronic. Note limited evaluation of the vasculature on noncontrast imaging  Vascular eval appreciated, needs CTA chest/A/P  discussed with renal, okay to proceed with CTA with IV hydration 12 hours pre and post study  for CTA today, discussed with radiology  f/u vascular post CTA     RUTH on CKD stage 3  Hyperkalemia  Cr 1.1 today, peaked at 1.9 and K is 5.8  Lokelma 10g Q12H for now, c/w monitoring  holding ACE  renal eval appreciated    CAD s/p CABG ~10 years ago  HTN  DLD  continue asa 81, metoprolol, Ranolazine  continue with statin, gemfibrozil, Ezetimibe nonformulary  holding lasix and lisinopril   Cardiology following     #Progress Note Handoff  Pending (specify):   improvement in respiratory status, CTA and vascular f/u post imaging  Family discussion: Plan of care discussed with patient, aware and agreeable   Disposition:  from home    Claudine Berry MD  s. [phone]

## 2021-04-26 LAB
ALBUMIN SERPL ELPH-MCNC: 2.9 G/DL — LOW (ref 3.5–5.2)
ALP SERPL-CCNC: 90 U/L — SIGNIFICANT CHANGE UP (ref 30–115)
ALT FLD-CCNC: 63 U/L — HIGH (ref 0–41)
ANION GAP SERPL CALC-SCNC: 9 MMOL/L — SIGNIFICANT CHANGE UP (ref 7–14)
APPEARANCE UR: CLEAR — SIGNIFICANT CHANGE UP
AST SERPL-CCNC: 148 U/L — HIGH (ref 0–41)
BACTERIA # UR AUTO: NEGATIVE — SIGNIFICANT CHANGE UP
BASOPHILS # BLD AUTO: 0.03 K/UL — SIGNIFICANT CHANGE UP (ref 0–0.2)
BASOPHILS NFR BLD AUTO: 0.4 % — SIGNIFICANT CHANGE UP (ref 0–1)
BILIRUB SERPL-MCNC: 0.6 MG/DL — SIGNIFICANT CHANGE UP (ref 0.2–1.2)
BILIRUB UR-MCNC: NEGATIVE — SIGNIFICANT CHANGE UP
BUN SERPL-MCNC: 26 MG/DL — HIGH (ref 10–20)
CALCIUM SERPL-MCNC: 8.3 MG/DL — LOW (ref 8.5–10.1)
CHLORIDE SERPL-SCNC: 106 MMOL/L — SIGNIFICANT CHANGE UP (ref 98–110)
CO2 SERPL-SCNC: 23 MMOL/L — SIGNIFICANT CHANGE UP (ref 17–32)
COLOR SPEC: YELLOW — SIGNIFICANT CHANGE UP
CREAT ?TM UR-MCNC: 101 MG/DL — SIGNIFICANT CHANGE UP
CREAT SERPL-MCNC: 1.1 MG/DL — SIGNIFICANT CHANGE UP (ref 0.7–1.5)
CRP SERPL-MCNC: 6 MG/L — HIGH
D DIMER BLD IA.RAPID-MCNC: 2049 NG/ML DDU — HIGH (ref 0–230)
DIFF PNL FLD: ABNORMAL
EOSINOPHIL # BLD AUTO: 0.1 K/UL — SIGNIFICANT CHANGE UP (ref 0–0.7)
EOSINOPHIL NFR BLD AUTO: 1.2 % — SIGNIFICANT CHANGE UP (ref 0–8)
EPI CELLS # UR: 1 /HPF — SIGNIFICANT CHANGE UP (ref 0–5)
FERRITIN SERPL-MCNC: 923 NG/ML — HIGH (ref 30–400)
GLUCOSE SERPL-MCNC: 90 MG/DL — SIGNIFICANT CHANGE UP (ref 70–99)
GLUCOSE UR QL: NEGATIVE — SIGNIFICANT CHANGE UP
HCT VFR BLD CALC: 35.3 % — LOW (ref 42–52)
HGB BLD-MCNC: 11.5 G/DL — LOW (ref 14–18)
HYALINE CASTS # UR AUTO: 1 /LPF — SIGNIFICANT CHANGE UP (ref 0–7)
IMM GRANULOCYTES NFR BLD AUTO: 2.9 % — HIGH (ref 0.1–0.3)
KETONES UR-MCNC: NEGATIVE — SIGNIFICANT CHANGE UP
LEUKOCYTE ESTERASE UR-ACNC: NEGATIVE — SIGNIFICANT CHANGE UP
LYMPHOCYTES # BLD AUTO: 1.16 K/UL — LOW (ref 1.2–3.4)
LYMPHOCYTES # BLD AUTO: 13.6 % — LOW (ref 20.5–51.1)
MAGNESIUM SERPL-MCNC: 2.5 MG/DL — HIGH (ref 1.8–2.4)
MCHC RBC-ENTMCNC: 30.9 PG — SIGNIFICANT CHANGE UP (ref 27–31)
MCHC RBC-ENTMCNC: 32.6 G/DL — SIGNIFICANT CHANGE UP (ref 32–37)
MCV RBC AUTO: 94.9 FL — HIGH (ref 80–94)
MONOCYTES # BLD AUTO: 0.56 K/UL — SIGNIFICANT CHANGE UP (ref 0.1–0.6)
MONOCYTES NFR BLD AUTO: 6.5 % — SIGNIFICANT CHANGE UP (ref 1.7–9.3)
NEUTROPHILS # BLD AUTO: 6.45 K/UL — SIGNIFICANT CHANGE UP (ref 1.4–6.5)
NEUTROPHILS NFR BLD AUTO: 75.4 % — HIGH (ref 42.2–75.2)
NITRITE UR-MCNC: NEGATIVE — SIGNIFICANT CHANGE UP
NRBC # BLD: 0 /100 WBCS — SIGNIFICANT CHANGE UP (ref 0–0)
PH UR: 6 — SIGNIFICANT CHANGE UP (ref 5–8)
PLATELET # BLD AUTO: 280 K/UL — SIGNIFICANT CHANGE UP (ref 130–400)
POTASSIUM SERPL-MCNC: 5 MMOL/L — SIGNIFICANT CHANGE UP (ref 3.5–5)
POTASSIUM SERPL-SCNC: 5 MMOL/L — SIGNIFICANT CHANGE UP (ref 3.5–5)
PROCALCITONIN SERPL-MCNC: 0.07 NG/ML — SIGNIFICANT CHANGE UP (ref 0.02–0.1)
PROT ?TM UR-MCNC: 47 MG/DLG/24H — SIGNIFICANT CHANGE UP
PROT SERPL-MCNC: 5.2 G/DL — LOW (ref 6–8)
PROT UR-MCNC: ABNORMAL
PROT/CREAT UR-RTO: 0.5 RATIO — HIGH (ref 0–0.2)
RBC # BLD: 3.72 M/UL — LOW (ref 4.7–6.1)
RBC # FLD: 14.5 % — SIGNIFICANT CHANGE UP (ref 11.5–14.5)
RBC CASTS # UR COMP ASSIST: 5 /HPF — HIGH (ref 0–4)
SODIUM SERPL-SCNC: 138 MMOL/L — SIGNIFICANT CHANGE UP (ref 135–146)
SP GR SPEC: 1.03 — SIGNIFICANT CHANGE UP (ref 1.01–1.03)
UROBILINOGEN FLD QL: SIGNIFICANT CHANGE UP
WBC # BLD: 8.55 K/UL — SIGNIFICANT CHANGE UP (ref 4.8–10.8)
WBC # FLD AUTO: 8.55 K/UL — SIGNIFICANT CHANGE UP (ref 4.8–10.8)
WBC UR QL: 3 /HPF — SIGNIFICANT CHANGE UP (ref 0–5)

## 2021-04-26 PROCEDURE — 99233 SBSQ HOSP IP/OBS HIGH 50: CPT

## 2021-04-26 RX ORDER — FUROSEMIDE 40 MG
20 TABLET ORAL DAILY
Refills: 0 | Status: DISCONTINUED | OUTPATIENT
Start: 2021-04-26 | End: 2021-04-29

## 2021-04-26 RX ADMIN — Medication 10 MILLIGRAM(S): at 11:27

## 2021-04-26 RX ADMIN — Medication 5 MILLIGRAM(S): at 21:24

## 2021-04-26 RX ADMIN — Medication 300 MILLIGRAM(S): at 11:27

## 2021-04-26 RX ADMIN — Medication 600 MILLIGRAM(S): at 05:47

## 2021-04-26 RX ADMIN — RANOLAZINE 1000 MILLIGRAM(S): 500 TABLET, FILM COATED, EXTENDED RELEASE ORAL at 17:45

## 2021-04-26 RX ADMIN — PANTOPRAZOLE SODIUM 40 MILLIGRAM(S): 20 TABLET, DELAYED RELEASE ORAL at 05:47

## 2021-04-26 RX ADMIN — Medication 100 MILLIGRAM(S): at 17:45

## 2021-04-26 RX ADMIN — Medication 81 MILLIGRAM(S): at 11:27

## 2021-04-26 RX ADMIN — RANOLAZINE 1000 MILLIGRAM(S): 500 TABLET, FILM COATED, EXTENDED RELEASE ORAL at 05:47

## 2021-04-26 RX ADMIN — Medication 600 MILLIGRAM(S): at 17:45

## 2021-04-26 RX ADMIN — Medication 20 MILLIGRAM(S): at 17:45

## 2021-04-26 RX ADMIN — AMLODIPINE BESYLATE 5 MILLIGRAM(S): 2.5 TABLET ORAL at 05:48

## 2021-04-26 RX ADMIN — HEPARIN SODIUM 7500 UNIT(S): 5000 INJECTION INTRAVENOUS; SUBCUTANEOUS at 05:47

## 2021-04-26 RX ADMIN — HEPARIN SODIUM 7500 UNIT(S): 5000 INJECTION INTRAVENOUS; SUBCUTANEOUS at 17:45

## 2021-04-26 RX ADMIN — ATORVASTATIN CALCIUM 80 MILLIGRAM(S): 80 TABLET, FILM COATED ORAL at 21:24

## 2021-04-26 RX ADMIN — CHLORHEXIDINE GLUCONATE 1 APPLICATION(S): 213 SOLUTION TOPICAL at 05:47

## 2021-04-26 RX ADMIN — TAMSULOSIN HYDROCHLORIDE 0.4 MILLIGRAM(S): 0.4 CAPSULE ORAL at 21:24

## 2021-04-26 RX ADMIN — Medication 100 MILLIGRAM(S): at 05:47

## 2021-04-26 NOTE — PROGRESS NOTE ADULT - ASSESSMENT
Pt is a 71 year old male with PMHx of CAD s/p CABG ~10 years ago, HTN, BPH, HLD, CKD ,  presents with SOB, cough, general, malaise, and decreased PO intake for 5 days prior to presentation.     #Acute Hypoxic respiratory failure due to severe COVID 19 PNA  #Cytokine storm  #Transaminitis  - currently on 3L nasal cannula saturating 94%  - s/p Toci 4/18, not a candidate for plasma or RDV  - s/p 10 day course of decadron  - D-dimer 768> 3100>1779> 2049 (4/26)  LE duplex 4/22 negative for DVT  off abx, procal 0.1  >NIV at bedtime and PRN  > c/w Heparin 7500 Q12H  > taper o2 as tolerated, trend inflammatory markers q48hrs  > PT following    #Abdominal aorta aneurysm  #Type B aortic dissection, asymptomatic  - worsenened as compared to prior  - CT chest NC 4/23: aneurysm is seen, 5.6 cm in diameter. Abdominal aortic aneurysm, maximally 4.9 cm at the diaphragmatic hiatus. Displaced intimal calcifications are suspicious for aortic dissection, age-indeterminate but potentially chronic.  - CTA C/A/P 4/25: Thoracic aortic aneurysm measuring 5.7 x 5.3 cm at T6-7 and 5.8 x 5.0 cm atthe diaphragmatic hiatus. 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 Right renal arteries both arise from false lumen with delayed right nephrogram.  > Patient will require surgical intervention w/ vascular as o/p, d/w vascular on 4/25  > F/u official vascular recommendations    #RUTH on CKD stage 3 (improved)  #Hyperkalemia  - Cr 1.1, stable (max 1.9) and K is 5.0 (max 5.9)  - Off lokelma  > holding ACE  > Re-start lasix  > Renal diet  > Send UA, UProt/Cr ratio    #CAD s/p CABG ~10 years ago  #HTN  #DLD  > continue asa 81, metoprolol, Ranolazine  > continue with statin, gemfibrozil, Ezetimibe nonformulary  > holding lasix and lisinopril   > Cardiology following     #Progress Note Handoff  DVT ppx  GI ppx  Pending (specify):   improvement in respiratory status, vascular   Family discussion: Plan of care discussed with patient and family (4/26)   Disposition:  from home

## 2021-04-26 NOTE — PROGRESS NOTE ADULT - ASSESSMENT
Pt is a 71 year old male with PMHx of CAD s/p CABG ~10 years ago, HTN, BPH, HLD, CKD ,  presents with SOB, cough, general, malaise, and decreased PO intake for 5 days prior to presentation. Currently in CEU on 6L nasal cannula    Acute Hypoxic respiratory failure due to severe COVID 19 PNA  Cytokine storm  Transaminitis  currently on 3L nasal cannula saturating 94%  NIV at bedtime and PRN, patient ambulates to the restroom independently   s/p Toci 4/18, not a candidate for plasma or RDV  s/p 10 day course of decadron  Ddimer 768---> 3100--->1779--> 2000  c/w Heparin 7500 Q12H  LE duplex 4/22 negative for DVT  off abx, procal 0.1  taper o2 as tolerated, trend inflammatory markers  PT working with patient  Check 02 on RA, may require oxygen at home     Tortuous Aorta/Aneysmal dilatation on chest xray  worsening as compared to prior  Patient without symptoms  CT chest abdomen without contrast 4/23: 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 Abdominal aortic aneurysm, maximally 4.9 cm at the diaphragmatic hiatus.  Displaced intimal calcifications are suspicious for aortic dissection, age-indeterminate but potentially chronic. Note limited evaluation of the vasculature on noncontrast imaging  Vascular on board, s/p CTA chest/A/P  Discussed results with vascular team yesterday, to follow up today with recommendations    RUTH on CKD stage 3  Hyperkalemia - resolved  Cr 1.1 today, peaked at 1.9 and K is 5.8  resume lasix today  holding ACE  renal following     CAD s/p CABG ~10 years ago  HTN  DLD  continue asa 81, metoprolol, Ranolazine  continue with statin, gemfibrozil, Ezetimibe nonformulary  holding lisinopril   Cardiology following     #Progress Note Handoff  Pending (specify): vascular follow up, eval for home oxygen  Family discussion: Plan of care discussed with patient, aware and agreeable   Disposition:  from home    Claudine Berry MD  s. [phone]

## 2021-04-26 NOTE — CHART NOTE - NSCHARTNOTEFT_GEN_A_CORE
Registered Dietitian Follow-Up - Unable to conduct face to face interview or NFPE d/t pt's isolation precautions r/t COVID-19     Patient Profile Reviewed                          Yes [x]   No []     Nutrition History Previously Obtained        Yes []  No [x]  Attempted to call pt's room but no answer      Pertinent Subjective Information: Pt is eating % of meal trays per EMR     Pertinent Medical Interventions: Acute Hypoxic respiratory failure due to severe COVID 19 PNA. RUTH on CKD stage 3 (improved). Hyperkalemia off lokelma now.      Diet order: Diet, Renal Restrictions:   For patients receiving Renal Replacement - No Protein Restr, No Conc K, No Conc Phos, Low Sodium (04-26-21 @ 10:48)    Anthropometrics:  Height (cm): 167.6 (04-23-21 @ 19:50)  Weight (kg): 103 (04-23-21 @ 19:50) no new weights   BMI (kg/m2): 36.7 (04-23-21 @ 19:50)  IBW:     MEDICATIONS  (STANDING):  amLODIPine   Tablet 5 milliGRAM(s) Oral daily  atorvastatin 80 milliGRAM(s) Oral at bedtime  dicyclomine 10 milliGRAM(s) Oral daily  furosemide    Tablet 20 milliGRAM(s) Oral daily  gemfibrozil 600 milliGRAM(s) Oral two times a day  heparin   Injectable 7500 Unit(s) SubCutaneous every 12 hours  insulin regular  human recombinant 10 Unit(s) IV Push once  metoprolol tartrate 100 milliGRAM(s) Oral two times a day  pantoprazole    Tablet 40 milliGRAM(s) Oral before breakfast    Pertinent Labs: 04-26 @ 07:17: Na 138, BUN 26<H>, Cr 1.1, BG 90, K+ 5.0, Phos --, Mg 2.5<H>, Alk Phos 90, ALT/SGPT 63<H>, AST/SGOT 148<H>, HbA1c --    Physical Findings:  - Appearance: alert, no edema   - GI function: Last BM 4/26  - Tubes:  - Oral/Mouth cavity: no issues  - Skin: intact     Nutrition Requirements:  Weight Used: 87.8 kg cont from RD initial note      Estimated Energy Needs: 1750-1955kcal/day (MSJ x1.1-1.2 d/t overweight BMI, limited activity at this time)  Estimated Protein Needs: 79-97g/day (0.9-1.1 g/kg CBW, reasons mentioned above & considering CKD)  Estimated Fluid Needs: 1ml/kcal or per LIP     Nutrient Intake: likely meeting needs     [] Previous Nutrition Diagnosis: inadequate protein/energy intake            [] Ongoing          [x] Resolved    [] No active nutrition diagnosis identified at this time     Nutrition Intervention: meal/snack      Goal/Expected Outcome: Pt to consume >75% of meal tray in 7 days      Indicator/Monitoring: RD to monitor energy intake, diet order, body comp, NFPF,renal/lyte profile     Recommendation:   - Continue renal restriction diet.   - Recheck phos labs Registered Dietitian Follow-Up - Unable to conduct face to face interview or NFPE d/t pt's isolation precautions r/t COVID-19/ assessment completed late d/t high volume of patients over weekend     Patient Profile Reviewed                          Yes [x]   No []     Nutrition History Previously Obtained        Yes []  No [x]  Attempted to call pt's room but no answer      Pertinent Subjective Information: Pt is eating % of meal trays per EMR     Pertinent Medical Interventions: Acute Hypoxic respiratory failure due to severe COVID 19 PNA. RUTH on CKD stage 3 (improved). Hyperkalemia off lokelma now.      Diet order: Diet, Renal Restrictions:   For patients receiving Renal Replacement - No Protein Restr, No Conc K, No Conc Phos, Low Sodium (04-26-21 @ 10:48)    Anthropometrics:  Height (cm): 167.6 (04-23-21 @ 19:50)  Weight (kg): 103 (04-23-21 @ 19:50) no new weights   BMI (kg/m2): 36.7 (04-23-21 @ 19:50)  IBW:     MEDICATIONS  (STANDING):  amLODIPine   Tablet 5 milliGRAM(s) Oral daily  atorvastatin 80 milliGRAM(s) Oral at bedtime  dicyclomine 10 milliGRAM(s) Oral daily  furosemide    Tablet 20 milliGRAM(s) Oral daily  gemfibrozil 600 milliGRAM(s) Oral two times a day  heparin   Injectable 7500 Unit(s) SubCutaneous every 12 hours  insulin regular  human recombinant 10 Unit(s) IV Push once  metoprolol tartrate 100 milliGRAM(s) Oral two times a day  pantoprazole    Tablet 40 milliGRAM(s) Oral before breakfast    Pertinent Labs: 04-26 @ 07:17: Na 138, BUN 26<H>, Cr 1.1, BG 90, K+ 5.0, Phos --, Mg 2.5<H>, Alk Phos 90, ALT/SGPT 63<H>, AST/SGOT 148<H>, HbA1c --    Physical Findings:  - Appearance: alert, no edema   - GI function: Last BM 4/26  - Tubes:  - Oral/Mouth cavity: no issues  - Skin: intact     Nutrition Requirements:  Weight Used: 87.8 kg cont from RD initial note      Estimated Energy Needs: 1750-1955kcal/day (MSJ x1.1-1.2 d/t overweight BMI, limited activity at this time)  Estimated Protein Needs: 79-97g/day (0.9-1.1 g/kg CBW, reasons mentioned above & considering CKD)  Estimated Fluid Needs: 1ml/kcal or per LIP     Nutrient Intake: likely meeting needs     [] Previous Nutrition Diagnosis: inadequate protein/energy intake            [] Ongoing          [x] Resolved    [] No active nutrition diagnosis identified at this time     Nutrition Intervention: meal/snack      Goal/Expected Outcome: Pt to consume >75% of meal tray in 7 days      Indicator/Monitoring: RD to monitor energy intake, diet order, body comp, NFPF,renal/lyte profile     Recommendation:   - Continue renal restriction diet.   - Recheck phos labs

## 2021-04-26 NOTE — PROGRESS NOTE ADULT - ASSESSMENT
resolved RUTH  CKD 2-3  TAA / aortic dissection  right renal artery originating from dissection false lumen with delayed right nephrogram  resolving ARF  COVID-19 PNA  CAD / CABG   HTN    plan:      completed IVF   f/u Cr s/p CTA  off lasix  off ACE-I  cont norvasc / lopressor / monitor BP's  f/u vascular surgery  full code  will follow

## 2021-04-27 LAB
ALBUMIN SERPL ELPH-MCNC: 3.2 G/DL — LOW (ref 3.5–5.2)
ALP SERPL-CCNC: 85 U/L — SIGNIFICANT CHANGE UP (ref 30–115)
ALT FLD-CCNC: 89 U/L — HIGH (ref 0–41)
ANION GAP SERPL CALC-SCNC: 10 MMOL/L — SIGNIFICANT CHANGE UP (ref 7–14)
AST SERPL-CCNC: 242 U/L — HIGH (ref 0–41)
BASOPHILS # BLD AUTO: 0.04 K/UL — SIGNIFICANT CHANGE UP (ref 0–0.2)
BASOPHILS NFR BLD AUTO: 0.4 % — SIGNIFICANT CHANGE UP (ref 0–1)
BILIRUB SERPL-MCNC: 0.8 MG/DL — SIGNIFICANT CHANGE UP (ref 0.2–1.2)
BUN SERPL-MCNC: 26 MG/DL — HIGH (ref 10–20)
CALCIUM SERPL-MCNC: 8.7 MG/DL — SIGNIFICANT CHANGE UP (ref 8.5–10.1)
CHLORIDE SERPL-SCNC: 104 MMOL/L — SIGNIFICANT CHANGE UP (ref 98–110)
CO2 SERPL-SCNC: 22 MMOL/L — SIGNIFICANT CHANGE UP (ref 17–32)
CREAT SERPL-MCNC: 1.1 MG/DL — SIGNIFICANT CHANGE UP (ref 0.7–1.5)
EOSINOPHIL # BLD AUTO: 0.13 K/UL — SIGNIFICANT CHANGE UP (ref 0–0.7)
EOSINOPHIL NFR BLD AUTO: 1.3 % — SIGNIFICANT CHANGE UP (ref 0–8)
GLUCOSE SERPL-MCNC: 76 MG/DL — SIGNIFICANT CHANGE UP (ref 70–99)
HCT VFR BLD CALC: 35.7 % — LOW (ref 42–52)
HGB BLD-MCNC: 11.7 G/DL — LOW (ref 14–18)
IMM GRANULOCYTES NFR BLD AUTO: 1.8 % — HIGH (ref 0.1–0.3)
LYMPHOCYTES # BLD AUTO: 1.47 K/UL — SIGNIFICANT CHANGE UP (ref 1.2–3.4)
LYMPHOCYTES # BLD AUTO: 15 % — LOW (ref 20.5–51.1)
MAGNESIUM SERPL-MCNC: 2.3 MG/DL — SIGNIFICANT CHANGE UP (ref 1.8–2.4)
MCHC RBC-ENTMCNC: 31.4 PG — HIGH (ref 27–31)
MCHC RBC-ENTMCNC: 32.8 G/DL — SIGNIFICANT CHANGE UP (ref 32–37)
MCV RBC AUTO: 95.7 FL — HIGH (ref 80–94)
MONOCYTES # BLD AUTO: 0.86 K/UL — HIGH (ref 0.1–0.6)
MONOCYTES NFR BLD AUTO: 8.8 % — SIGNIFICANT CHANGE UP (ref 1.7–9.3)
NEUTROPHILS # BLD AUTO: 7.14 K/UL — HIGH (ref 1.4–6.5)
NEUTROPHILS NFR BLD AUTO: 72.7 % — SIGNIFICANT CHANGE UP (ref 42.2–75.2)
NRBC # BLD: 0 /100 WBCS — SIGNIFICANT CHANGE UP (ref 0–0)
PLATELET # BLD AUTO: 281 K/UL — SIGNIFICANT CHANGE UP (ref 130–400)
POTASSIUM SERPL-MCNC: 5.1 MMOL/L — HIGH (ref 3.5–5)
POTASSIUM SERPL-SCNC: 5.1 MMOL/L — HIGH (ref 3.5–5)
PROT SERPL-MCNC: 5.7 G/DL — LOW (ref 6–8)
RBC # BLD: 3.73 M/UL — LOW (ref 4.7–6.1)
RBC # FLD: 14.6 % — HIGH (ref 11.5–14.5)
SODIUM SERPL-SCNC: 136 MMOL/L — SIGNIFICANT CHANGE UP (ref 135–146)
WBC # BLD: 9.82 K/UL — SIGNIFICANT CHANGE UP (ref 4.8–10.8)
WBC # FLD AUTO: 9.82 K/UL — SIGNIFICANT CHANGE UP (ref 4.8–10.8)

## 2021-04-27 PROCEDURE — 99232 SBSQ HOSP IP/OBS MODERATE 35: CPT

## 2021-04-27 RX ORDER — ENOXAPARIN SODIUM 100 MG/ML
40 INJECTION SUBCUTANEOUS AT BEDTIME
Refills: 0 | Status: DISCONTINUED | OUTPATIENT
Start: 2021-04-27 | End: 2021-04-29

## 2021-04-27 RX ADMIN — Medication 300 MILLIGRAM(S): at 12:47

## 2021-04-27 RX ADMIN — RANOLAZINE 1000 MILLIGRAM(S): 500 TABLET, FILM COATED, EXTENDED RELEASE ORAL at 05:29

## 2021-04-27 RX ADMIN — ATORVASTATIN CALCIUM 80 MILLIGRAM(S): 80 TABLET, FILM COATED ORAL at 21:31

## 2021-04-27 RX ADMIN — Medication 600 MILLIGRAM(S): at 05:28

## 2021-04-27 RX ADMIN — TAMSULOSIN HYDROCHLORIDE 0.4 MILLIGRAM(S): 0.4 CAPSULE ORAL at 21:31

## 2021-04-27 RX ADMIN — Medication 20 MILLIGRAM(S): at 05:29

## 2021-04-27 RX ADMIN — Medication 100 MILLIGRAM(S): at 17:11

## 2021-04-27 RX ADMIN — RANOLAZINE 1000 MILLIGRAM(S): 500 TABLET, FILM COATED, EXTENDED RELEASE ORAL at 17:11

## 2021-04-27 RX ADMIN — CHLORHEXIDINE GLUCONATE 1 APPLICATION(S): 213 SOLUTION TOPICAL at 05:27

## 2021-04-27 RX ADMIN — HEPARIN SODIUM 7500 UNIT(S): 5000 INJECTION INTRAVENOUS; SUBCUTANEOUS at 05:28

## 2021-04-27 RX ADMIN — Medication 10 MILLIGRAM(S): at 12:48

## 2021-04-27 RX ADMIN — PANTOPRAZOLE SODIUM 40 MILLIGRAM(S): 20 TABLET, DELAYED RELEASE ORAL at 05:29

## 2021-04-27 RX ADMIN — Medication 600 MILLIGRAM(S): at 17:10

## 2021-04-27 RX ADMIN — Medication 81 MILLIGRAM(S): at 12:48

## 2021-04-27 RX ADMIN — AMLODIPINE BESYLATE 5 MILLIGRAM(S): 2.5 TABLET ORAL at 05:29

## 2021-04-27 RX ADMIN — ENOXAPARIN SODIUM 40 MILLIGRAM(S): 100 INJECTION SUBCUTANEOUS at 21:31

## 2021-04-27 RX ADMIN — Medication 5 MILLIGRAM(S): at 21:31

## 2021-04-27 RX ADMIN — Medication 100 MILLIGRAM(S): at 05:29

## 2021-04-27 NOTE — PROGRESS NOTE ADULT - ASSESSMENT
COVID PNA  Found large TAAA  Recovering from PNA  CTA reviewed by Dr. Kelly  Pt may follow up as outpt in 10-14 days    SPECTRA [id number]

## 2021-04-27 NOTE — PROGRESS NOTE ADULT - ASSESSMENT
Pt is a 71 year old male with PMHx of CAD s/p CABG ~10 years ago, HTN, BPH, HLD, CKD ,  presents with SOB, cough, general, malaise, and decreased PO intake for 5 days prior to presentation. Currently in CEU on 6L nasal cannula    Acute Hypoxic respiratory failure due to severe COVID 19 PNA  Cytokine storm  Transaminitis  currently on 3L nasal cannula saturating 93%  NIV at bedtime and PRN, patient ambulates to the restroom independently   s/p Toci 4/18, not a candidate for plasma or RDV  s/p 10 day course of decadron  Ddimer 768---> 3100--->1779--> 2000  c/w Heparin 7500 Q12H  LE duplex 4/22 negative for DVT  off abx, procal 0.1  taper o2 as tolerated, trend inflammatory markers  PT working with patient  Check 02 on RA, may require oxygen at home     Tortuous Aorta/Aneysmal dilatation on chest xray  worsening as compared to prior  Patient without symptoms  CT chest abdomen without contrast 4/23: 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 Abdominal aortic aneurysm, maximally 4.9 cm at the diaphragmatic hiatus.  Displaced intimal calcifications are suspicious for aortic dissection, age-indeterminate but potentially chronic. Note limited evaluation of the vasculature on noncontrast imaging  Vascular on board, s/p CTA chest/A/P  Discussed results with vascular team, will follow up    RUTH on CKD stage 3  Hyperkalemia - resolved  Cr 1.1 today, peaked at 1.9 and K is 5.8  resume lasix today  holding ACE, Lokelma 10g PRN for K >5.5  renal following     CAD s/p CABG ~10 years ago  HTN  DLD  continue asa 81, metoprolol, Ranolazine  continue with statin, gemfibrozil, Ezetimibe nonformulary  holding lisinopril   Cardiology following     #Progress Note Handoff  Pending (specify): vascular follow up, eval for home oxygen  Family discussion: Plan of care discussed with patient, aware and agreeable   Disposition:  from home    Claudine Berry MD  s. [phone]

## 2021-04-27 NOTE — PROGRESS NOTE ADULT - ASSESSMENT
resolved RUTH  CKD 2-3  TAA / aortic dissection  right renal artery originating from dissection false lumen with delayed right nephrogram  resolving ARF  COVID-19 PNA  CAD / CABG   HTN    plan:      lasix 20mg po qd resumed  off ACE-I  cont norvasc / lopressor / monitor BP's  O2 via NC  f/u vascular

## 2021-04-27 NOTE — PROGRESS NOTE ADULT - ASSESSMENT
Pt is a 71 year old male with PMHx of CAD s/p CABG ~10 years ago, HTN, BPH, HLD, CKD ,  presents with SOB, cough, general, malaise, and decreased PO intake for 5 days prior to presentation.     #Post-COVID hypoxic respiratory failure  #Cytokine storm (resolved)  #Transaminitis  - currently on 3L nasal cannula saturating 93%  - S/p COVID tx w/ toci (4/18); never a candidate for plasma/RDV  - s/p Toci 4/18 and decadron, not a candidate for plasma or RDV  - D-dimer 768> 3100>1779> 2049 (4/26)  - LE duplex 4/22 negative for DVT  - off abx, procal 0.1  >NIV at bedtime and PRN  > Switch heparin sq to lovenox  > Inflammatory markers q48hrs  > SpO2 on ambulation  > taper o2 as tolerated  > PT following    #Abdominal aorta aneurysm  #Type B aortic dissection, asymptomatic  - worsenened as compared to prior  - CT chest NC 4/23: aneurysm is seen, 5.6 cm in diameter. Abdominal aortic aneurysm, maximally 4.9 cm at the diaphragmatic hiatus. Displaced intimal calcifications are suspicious for aortic dissection, age-indeterminate but potentially chronic.  - CTA C/A/P 4/25: Thoracic aortic aneurysm measuring 5.7 x 5.3 cm at T6-7 and 5.8 x 5.0 cm atthe diaphragmatic hiatus. 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 Right renal arteries both arise from false lumen with delayed right nephrogram.  > Patient will require surgical intervention w/ vascular as o/p, d/w vascular on 4/25  > F/u vascular recommendations (will review CT, d/w vascular team 4/27)    #RUTH on CKD stage 3 (improved)  #Hyperkalemia  - Cr 1.1, stable (max 1.9) and K is 5.1 (max 5.9)  - UProt/Cr: 5.0; UA neg  - Off lokelma  > holding ACE  > C/w lasix  > Renal diet  > Nephro following    #CAD s/p CABG ~10 years ago  #HTN  #DLD  > continue asa 81, metoprolol, Ranolazine  > continue with statin, gemfibrozil, Ezetimibe nonformulary  > holding lisinopril     #Progress Note Handoff  DVT ppx  GI ppx  Pending (specify): improvement in respiratory status, vascular   Family discussion: Plan of care discussed with patient and family  Disposition: D/C in next 24-48hrs  Pending: Ambulation/PT, home O2 setup

## 2021-04-28 LAB
ALBUMIN SERPL ELPH-MCNC: 3.3 G/DL — LOW (ref 3.5–5.2)
ALP SERPL-CCNC: 79 U/L — SIGNIFICANT CHANGE UP (ref 30–115)
ALT FLD-CCNC: 105 U/L — HIGH (ref 0–41)
ANION GAP SERPL CALC-SCNC: 8 MMOL/L — SIGNIFICANT CHANGE UP (ref 7–14)
AST SERPL-CCNC: 289 U/L — HIGH (ref 0–41)
BASOPHILS # BLD AUTO: 0.04 K/UL — SIGNIFICANT CHANGE UP (ref 0–0.2)
BASOPHILS NFR BLD AUTO: 0.4 % — SIGNIFICANT CHANGE UP (ref 0–1)
BILIRUB SERPL-MCNC: 0.7 MG/DL — SIGNIFICANT CHANGE UP (ref 0.2–1.2)
BUN SERPL-MCNC: 30 MG/DL — HIGH (ref 10–20)
CALCIUM SERPL-MCNC: 8.6 MG/DL — SIGNIFICANT CHANGE UP (ref 8.5–10.1)
CHLORIDE SERPL-SCNC: 102 MMOL/L — SIGNIFICANT CHANGE UP (ref 98–110)
CO2 SERPL-SCNC: 25 MMOL/L — SIGNIFICANT CHANGE UP (ref 17–32)
CREAT SERPL-MCNC: 1.2 MG/DL — SIGNIFICANT CHANGE UP (ref 0.7–1.5)
CRP SERPL-MCNC: 3 MG/L — SIGNIFICANT CHANGE UP
D DIMER BLD IA.RAPID-MCNC: 922 NG/ML DDU — HIGH (ref 0–230)
EOSINOPHIL # BLD AUTO: 0.1 K/UL — SIGNIFICANT CHANGE UP (ref 0–0.7)
EOSINOPHIL NFR BLD AUTO: 1.1 % — SIGNIFICANT CHANGE UP (ref 0–8)
FERRITIN SERPL-MCNC: 896 NG/ML — HIGH (ref 30–400)
GLUCOSE SERPL-MCNC: 85 MG/DL — SIGNIFICANT CHANGE UP (ref 70–99)
HCT VFR BLD CALC: 37.9 % — LOW (ref 42–52)
HGB BLD-MCNC: 12.5 G/DL — LOW (ref 14–18)
IMM GRANULOCYTES NFR BLD AUTO: 2.1 % — HIGH (ref 0.1–0.3)
LYMPHOCYTES # BLD AUTO: 1.55 K/UL — SIGNIFICANT CHANGE UP (ref 1.2–3.4)
LYMPHOCYTES # BLD AUTO: 16.9 % — LOW (ref 20.5–51.1)
MAGNESIUM SERPL-MCNC: 2 MG/DL — SIGNIFICANT CHANGE UP (ref 1.8–2.4)
MCHC RBC-ENTMCNC: 31.7 PG — HIGH (ref 27–31)
MCHC RBC-ENTMCNC: 33 G/DL — SIGNIFICANT CHANGE UP (ref 32–37)
MCV RBC AUTO: 96.2 FL — HIGH (ref 80–94)
MONOCYTES # BLD AUTO: 0.84 K/UL — HIGH (ref 0.1–0.6)
MONOCYTES NFR BLD AUTO: 9.2 % — SIGNIFICANT CHANGE UP (ref 1.7–9.3)
NEUTROPHILS # BLD AUTO: 6.43 K/UL — SIGNIFICANT CHANGE UP (ref 1.4–6.5)
NEUTROPHILS NFR BLD AUTO: 70.3 % — SIGNIFICANT CHANGE UP (ref 42.2–75.2)
NRBC # BLD: 0 /100 WBCS — SIGNIFICANT CHANGE UP (ref 0–0)
PLATELET # BLD AUTO: 298 K/UL — SIGNIFICANT CHANGE UP (ref 130–400)
POTASSIUM SERPL-MCNC: 5.3 MMOL/L — HIGH (ref 3.5–5)
POTASSIUM SERPL-SCNC: 5.3 MMOL/L — HIGH (ref 3.5–5)
PROCALCITONIN SERPL-MCNC: 0.04 NG/ML — SIGNIFICANT CHANGE UP (ref 0.02–0.1)
PROT SERPL-MCNC: 5.8 G/DL — LOW (ref 6–8)
RBC # BLD: 3.94 M/UL — LOW (ref 4.7–6.1)
RBC # FLD: 15 % — HIGH (ref 11.5–14.5)
SODIUM SERPL-SCNC: 135 MMOL/L — SIGNIFICANT CHANGE UP (ref 135–146)
WBC # BLD: 9.15 K/UL — SIGNIFICANT CHANGE UP (ref 4.8–10.8)
WBC # FLD AUTO: 9.15 K/UL — SIGNIFICANT CHANGE UP (ref 4.8–10.8)

## 2021-04-28 PROCEDURE — 71045 X-RAY EXAM CHEST 1 VIEW: CPT | Mod: 26

## 2021-04-28 PROCEDURE — 99233 SBSQ HOSP IP/OBS HIGH 50: CPT

## 2021-04-28 RX ORDER — SODIUM POLYSTYRENE SULFONATE 4.1 MEQ/G
30 POWDER, FOR SUSPENSION ORAL ONCE
Refills: 0 | Status: COMPLETED | OUTPATIENT
Start: 2021-04-28 | End: 2021-04-28

## 2021-04-28 RX ORDER — AMLODIPINE BESYLATE 2.5 MG/1
5 TABLET ORAL DAILY
Refills: 0 | Status: DISCONTINUED | OUTPATIENT
Start: 2021-04-28 | End: 2021-04-29

## 2021-04-28 RX ADMIN — AMLODIPINE BESYLATE 5 MILLIGRAM(S): 2.5 TABLET ORAL at 12:19

## 2021-04-28 RX ADMIN — ENOXAPARIN SODIUM 40 MILLIGRAM(S): 100 INJECTION SUBCUTANEOUS at 21:53

## 2021-04-28 RX ADMIN — Medication 20 MILLIGRAM(S): at 05:19

## 2021-04-28 RX ADMIN — Medication 600 MILLIGRAM(S): at 05:19

## 2021-04-28 RX ADMIN — AMLODIPINE BESYLATE 5 MILLIGRAM(S): 2.5 TABLET ORAL at 05:19

## 2021-04-28 RX ADMIN — CHLORHEXIDINE GLUCONATE 1 APPLICATION(S): 213 SOLUTION TOPICAL at 05:19

## 2021-04-28 RX ADMIN — PANTOPRAZOLE SODIUM 40 MILLIGRAM(S): 20 TABLET, DELAYED RELEASE ORAL at 05:19

## 2021-04-28 RX ADMIN — Medication 300 MILLIGRAM(S): at 12:19

## 2021-04-28 RX ADMIN — Medication 600 MILLIGRAM(S): at 17:15

## 2021-04-28 RX ADMIN — Medication 10 MILLIGRAM(S): at 12:19

## 2021-04-28 RX ADMIN — RANOLAZINE 1000 MILLIGRAM(S): 500 TABLET, FILM COATED, EXTENDED RELEASE ORAL at 17:15

## 2021-04-28 RX ADMIN — Medication 100 MILLIGRAM(S): at 17:15

## 2021-04-28 RX ADMIN — RANOLAZINE 1000 MILLIGRAM(S): 500 TABLET, FILM COATED, EXTENDED RELEASE ORAL at 05:19

## 2021-04-28 RX ADMIN — TAMSULOSIN HYDROCHLORIDE 0.4 MILLIGRAM(S): 0.4 CAPSULE ORAL at 21:53

## 2021-04-28 RX ADMIN — Medication 5 MILLIGRAM(S): at 21:53

## 2021-04-28 RX ADMIN — Medication 81 MILLIGRAM(S): at 12:19

## 2021-04-28 RX ADMIN — Medication 100 MILLIGRAM(S): at 05:19

## 2021-04-28 RX ADMIN — SODIUM POLYSTYRENE SULFONATE 30 GRAM(S): 4.1 POWDER, FOR SUSPENSION ORAL at 17:14

## 2021-04-28 NOTE — PROGRESS NOTE ADULT - ASSESSMENT
resolved RUTH  CKD 2-3  TAA / aortic dissection  right renal artery originating from dissection false lumen with delayed right nephrogram  resolving ARF  COVID-19 PNA  CAD / CABG   HTN    plan:      cont lasix 20mg po qd   off ACE-I  cont norvasc / lopressor / monitor BP's  low K+ diet  lokelma prn  O2 via NC

## 2021-04-28 NOTE — CHART NOTE - NSCHARTNOTESELECT_GEN_ALL_CORE
Event Note
Transfer note/Event Note
communication/Event Note
communication/Event Note
COVID Communication Team/Event Note
COVID Communication Team/Event Note
Letter of Necessity/Event Note
Upgrade/Transfer Note

## 2021-04-28 NOTE — PROGRESS NOTE ADULT - ASSESSMENT
Pt is a 71 year old male with PMHx of CAD s/p CABG ~10 years ago, HTN, BPH, HLD, CKD ,  presents with SOB, cough, general, malaise, and decreased PO intake for 5 days prior to presentation.     #Post-COVID hypoxic respiratory failure  #Cytokine storm (resolved)  - 89% on RA at rest drops to 89% on ambulation. 94% sat on 3L NC at rest, down to 91% on ambulation.   - S/p COVID tx w/ toci (4/18); never a candidate for plasma/RDV  - s/p Toci 4/18 and decadron, not a candidate for plasma or RDV  - D-dimer 768> 3100>1779> 2049 > 922 (4/28)  - LE duplex 4/22 negative for DVT  - off abx, procal 0.1  >NIV at bedtime and PRN  > C/w subq lovenox  > Inflammatory markers q48hrs  > taper o2 as tolerated  > PT following    #Transaminitis (worsening)  - Likely acute liver damage 2/2 COVID  - AST: 112 > 148 > 242 > 289 (4/28)  - ALT: 63 > 89 > 105 (4/28)  > F/u CMP, no tx at this time     #Abdominal aorta aneurysm  #Type B aortic dissection, asymptomatic  - worsenened as compared to prior  - CT chest NC 4/23: aneurysm is seen, 5.6 cm in diameter. Abdominal aortic aneurysm, maximally 4.9 cm at the diaphragmatic hiatus. Displaced intimal calcifications are suspicious for aortic dissection, age-indeterminate but potentially chronic.  - CTA C/A/P 4/25: Thoracic aortic aneurysm measuring 5.7 x 5.3 cm at T6-7 and 5.8 x 5.0 cm atthe diaphragmatic hiatus. 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 Right renal arteries both arise from false lumen with delayed right nephrogram.  - Patient will require surgical intervention w/ vascular as o/p  > Pt will f/u as o/p with vascular in 1-2 weeks after d/c    #RUTH on CKD stage 3 (improved)  #Hyperkalemia  - Cr 1.2, stable (max 1.9) and K is 5.1 (max 5.9)  - UProt/Cr: 5.0; UA neg  - Off lokelma  > holding ACE  > C/w lasix  > Renal diet  > Nephro following    #CAD s/p CABG ~10 years ago  #HTN  #DLD  > continue asa 81, metoprolol, Ranolazine  > continue with statin, gemfibrozil, Ezetimibe nonformulary  > holding lisinopril (K elevated)    #Progress Note Handoff  DVT ppx  GI ppx  Pending (specify): Home O2 set up, clinical improvement   Family discussion: Plan of care discussed with patient and family  Disposition: D/C in next 24-48hrs   Pt is a 71 year old male with PMHx of CAD s/p CABG ~10 years ago, HTN, BPH, HLD, CKD ,  presents with SOB, cough, general, malaise, and decreased PO intake for 5 days prior to presentation.     #Post-COVID hypoxic respiratory failure  #Cytokine storm (resolved)  - 89% on RA at rest drops to 89% on ambulation. 94% sat on 3L NC at rest, down to 91% on ambulation.   - S/p COVID tx w/ toci (4/18); never a candidate for plasma/RDV  - s/p Toci 4/18 and decadron, not a candidate for plasma or RDV  - D-dimer 768> 3100>1779> 2049 > 922 (4/28)  - LE duplex 4/22 negative for DVT  - off abx, procal 0.1  >NIV at bedtime and PRN  > C/w subq lovenox  > Inflammatory markers q48hrs  > taper o2 as tolerated  > PT following    #Transaminitis (worsening)  - Likely acute liver damage 2/2 COVID  - AST: 112 > 148 > 242 > 289 (4/28)  - ALT: 63 > 89 > 105 (4/28)  > Hold statin  > F/u CMP    #Hyperkalemia (worsening)  > Kayexalate 30mg x1  > Low K diet  > F/u BMP in AM    #Abdominal aorta aneurysm  #Type B aortic dissection, asymptomatic  - worsenened as compared to prior  - CT chest NC 4/23: aneurysm is seen, 5.6 cm in diameter. Abdominal aortic aneurysm, maximally 4.9 cm at the diaphragmatic hiatus. Displaced intimal calcifications are suspicious for aortic dissection, age-indeterminate but potentially chronic.  - CTA C/A/P 4/25: Thoracic aortic aneurysm measuring 5.7 x 5.3 cm at T6-7 and 5.8 x 5.0 cm atthe diaphragmatic hiatus. 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 Right renal arteries both arise from false lumen with delayed right nephrogram.  - Patient will require surgical intervention w/ vascular as o/p  > Pt will f/u as o/p with vascular in 1-2 weeks after d/c    #RUTH on CKD stage 3 (improved)  #Hyperkalemia  - Cr 1.2, stable (max 1.9) and K is 5.1 (max 5.9)  - UProt/Cr: 5.0; UA neg  - Off lokelma  > holding ACE  > C/w lasix  > Renal diet  > Nephro following    #CAD s/p CABG ~10 years ago  #HTN  #DLD  > continue asa 81, metoprolol, Ranolazine  > continue with statin, gemfibrozil, Ezetimibe nonformulary  > holding lisinopril (K elevated)    #Progress Note Handoff  DVT ppx  GI ppx  Pending (specify): Home O2 set up, clinical improvement   Family discussion: Plan of care discussed with patient and family  Disposition: D/C in next 24-48hrs

## 2021-04-28 NOTE — CHART NOTE - NSCHARTNOTEFT_GEN_A_CORE
Pt requires home oxygen due to covid pna desaturating with ambulation. Dexamethasone course and tocilizumab have been tried and are unsuccessful. Patient is otherwise in a stable state.     Pt is aware and agreeable to home O2 and will require concentrator and portable O2 sent to pt home.    O2 sat on RA at rest is 89%.  O2 sat on RA on ambulation decreases to 85%.  O2 sat on 3L on ambulation improves to 91%.  O2 sat on 3L at rest is 94%.

## 2021-04-29 ENCOUNTER — TRANSCRIPTION ENCOUNTER (OUTPATIENT)
Age: 71
End: 2021-04-29

## 2021-04-29 VITALS — DIASTOLIC BLOOD PRESSURE: 72 MMHG | HEART RATE: 91 BPM | SYSTOLIC BLOOD PRESSURE: 129 MMHG

## 2021-04-29 LAB
ALBUMIN SERPL ELPH-MCNC: 3.4 G/DL — LOW (ref 3.5–5.2)
ALP SERPL-CCNC: 68 U/L — SIGNIFICANT CHANGE UP (ref 30–115)
ALT FLD-CCNC: 128 U/L — HIGH (ref 0–41)
ANION GAP SERPL CALC-SCNC: 10 MMOL/L — SIGNIFICANT CHANGE UP (ref 7–14)
AST SERPL-CCNC: 343 U/L — HIGH (ref 0–41)
BASOPHILS # BLD AUTO: 0.05 K/UL — SIGNIFICANT CHANGE UP (ref 0–0.2)
BASOPHILS NFR BLD AUTO: 0.6 % — SIGNIFICANT CHANGE UP (ref 0–1)
BILIRUB SERPL-MCNC: 0.7 MG/DL — SIGNIFICANT CHANGE UP (ref 0.2–1.2)
BUN SERPL-MCNC: 30 MG/DL — HIGH (ref 10–20)
CALCIUM SERPL-MCNC: 9 MG/DL — SIGNIFICANT CHANGE UP (ref 8.5–10.1)
CHLORIDE SERPL-SCNC: 103 MMOL/L — SIGNIFICANT CHANGE UP (ref 98–110)
CO2 SERPL-SCNC: 25 MMOL/L — SIGNIFICANT CHANGE UP (ref 17–32)
CREAT SERPL-MCNC: 1.1 MG/DL — SIGNIFICANT CHANGE UP (ref 0.7–1.5)
EOSINOPHIL # BLD AUTO: 0.08 K/UL — SIGNIFICANT CHANGE UP (ref 0–0.7)
EOSINOPHIL NFR BLD AUTO: 0.9 % — SIGNIFICANT CHANGE UP (ref 0–8)
GLUCOSE SERPL-MCNC: 87 MG/DL — SIGNIFICANT CHANGE UP (ref 70–99)
HCT VFR BLD CALC: 36.1 % — LOW (ref 42–52)
HGB BLD-MCNC: 11.9 G/DL — LOW (ref 14–18)
IMM GRANULOCYTES NFR BLD AUTO: 2 % — HIGH (ref 0.1–0.3)
LYMPHOCYTES # BLD AUTO: 1.66 K/UL — SIGNIFICANT CHANGE UP (ref 1.2–3.4)
LYMPHOCYTES # BLD AUTO: 19.3 % — LOW (ref 20.5–51.1)
MAGNESIUM SERPL-MCNC: 2.1 MG/DL — SIGNIFICANT CHANGE UP (ref 1.8–2.4)
MCHC RBC-ENTMCNC: 31.5 PG — HIGH (ref 27–31)
MCHC RBC-ENTMCNC: 33 G/DL — SIGNIFICANT CHANGE UP (ref 32–37)
MCV RBC AUTO: 95.5 FL — HIGH (ref 80–94)
MONOCYTES # BLD AUTO: 1.05 K/UL — HIGH (ref 0.1–0.6)
MONOCYTES NFR BLD AUTO: 12.2 % — HIGH (ref 1.7–9.3)
NEUTROPHILS # BLD AUTO: 5.59 K/UL — SIGNIFICANT CHANGE UP (ref 1.4–6.5)
NEUTROPHILS NFR BLD AUTO: 65 % — SIGNIFICANT CHANGE UP (ref 42.2–75.2)
NRBC # BLD: 0 /100 WBCS — SIGNIFICANT CHANGE UP (ref 0–0)
PLATELET # BLD AUTO: 270 K/UL — SIGNIFICANT CHANGE UP (ref 130–400)
POTASSIUM SERPL-MCNC: 4.9 MMOL/L — SIGNIFICANT CHANGE UP (ref 3.5–5)
POTASSIUM SERPL-SCNC: 4.9 MMOL/L — SIGNIFICANT CHANGE UP (ref 3.5–5)
PROT SERPL-MCNC: 5.7 G/DL — LOW (ref 6–8)
RBC # BLD: 3.78 M/UL — LOW (ref 4.7–6.1)
RBC # FLD: 15.3 % — HIGH (ref 11.5–14.5)
SODIUM SERPL-SCNC: 138 MMOL/L — SIGNIFICANT CHANGE UP (ref 135–146)
WBC # BLD: 8.6 K/UL — SIGNIFICANT CHANGE UP (ref 4.8–10.8)
WBC # FLD AUTO: 8.6 K/UL — SIGNIFICANT CHANGE UP (ref 4.8–10.8)

## 2021-04-29 PROCEDURE — 99239 HOSP IP/OBS DSCHRG MGMT >30: CPT

## 2021-04-29 RX ORDER — ATORVASTATIN CALCIUM 80 MG/1
0 TABLET, FILM COATED ORAL
Qty: 0 | Refills: 0 | DISCHARGE

## 2021-04-29 RX ORDER — LISINOPRIL 2.5 MG/1
1 TABLET ORAL
Qty: 0 | Refills: 0 | DISCHARGE

## 2021-04-29 RX ORDER — GEMFIBROZIL 600 MG
1 TABLET ORAL
Qty: 0 | Refills: 0 | DISCHARGE

## 2021-04-29 RX ADMIN — Medication 81 MILLIGRAM(S): at 11:13

## 2021-04-29 RX ADMIN — Medication 10 MILLIGRAM(S): at 11:13

## 2021-04-29 RX ADMIN — AMLODIPINE BESYLATE 5 MILLIGRAM(S): 2.5 TABLET ORAL at 05:19

## 2021-04-29 RX ADMIN — Medication 600 MILLIGRAM(S): at 05:20

## 2021-04-29 RX ADMIN — RANOLAZINE 1000 MILLIGRAM(S): 500 TABLET, FILM COATED, EXTENDED RELEASE ORAL at 05:20

## 2021-04-29 RX ADMIN — PANTOPRAZOLE SODIUM 40 MILLIGRAM(S): 20 TABLET, DELAYED RELEASE ORAL at 06:44

## 2021-04-29 RX ADMIN — Medication 300 MILLIGRAM(S): at 11:13

## 2021-04-29 RX ADMIN — RANOLAZINE 1000 MILLIGRAM(S): 500 TABLET, FILM COATED, EXTENDED RELEASE ORAL at 17:47

## 2021-04-29 RX ADMIN — Medication 100 MILLIGRAM(S): at 05:20

## 2021-04-29 RX ADMIN — Medication 100 MILLIGRAM(S): at 17:47

## 2021-04-29 RX ADMIN — CHLORHEXIDINE GLUCONATE 1 APPLICATION(S): 213 SOLUTION TOPICAL at 05:19

## 2021-04-29 RX ADMIN — Medication 20 MILLIGRAM(S): at 05:19

## 2021-04-29 NOTE — DISCHARGE NOTE PROVIDER - NSDCFUADDINST_GEN_ALL_CORE_FT
Don't take your cholesterol medication until you see your primary doctor. Try not to take Tylenol if possible until you see your primary doctor.  You will need blood work done (LFTs) when you see your primary     Follow ups:  1. Primary care for follow up on transaminitis/liver tests  2. Vascular surgery for aneurysm/dissection  3. Pulmonary--post COVID follow up   4. Nephrology for CKD Don't take your cholesterol medication (atorvastatin and gemfibrozil) until you see your primary doctor. Try not to take Tylenol if possible until you see your primary doctor.  You will need blood work done (LFTs) when you see your primary     Follow ups:  1. Primary care for follow up on transaminitis/liver tests  2. Vascular surgery for aneurysm/dissection  3. Pulmonary--post COVID follow up   4. Nephrology for CKD

## 2021-04-29 NOTE — DISCHARGE NOTE PROVIDER - PROVIDER TOKENS
PROVIDER:[TOKEN:[[id number]],FOLLOWUP:[1 week]],PROVIDER:[TOKEN:[[id number]],FOLLOWUP:[2 weeks]],PROVIDER:[TOKEN:[[id number]],FOLLOWUP:[2 weeks]],PROVIDER:[TOKEN:[[id number]],FOLLOWUP:[1 week]]

## 2021-04-29 NOTE — PROGRESS NOTE ADULT - ASSESSMENT
resolved RUTH  CKD 2-3  TAA / aortic dissection  right renal artery originating from dissection false lumen with delayed right nephrogram  resolving ARF  COVID-19 PNA  CAD / CABG   HTN    plan:      cont lasix 20mg po qd   off ACE-I  cont norvasc / lopressor / monitor BP's  O2 via NC  dc planning

## 2021-04-29 NOTE — PROGRESS NOTE ADULT - NSICDXPILOT_GEN_ALL_CORE
[place]

## 2021-04-29 NOTE — PROGRESS NOTE ADULT - PROVIDER SPECIALTY LIST ADULT
Infectious Disease
Internal Medicine
Internal Medicine
Cardiology
Hospitalist
Internal Medicine
Nephrology
Cardiology
Hospitalist
Hospitalist
Internal Medicine
Pulmonology
Vascular Surgery
Internal Medicine
Internal Medicine
Nephrology
Nephrology
Pulmonology

## 2021-04-29 NOTE — PROGRESS NOTE ADULT - TIME BILLING
complete patient's bedside assessment, review medical chart, discuss medical plan of care with covering medical team
complete patient's bedside assessment, review medical chart, discuss medical plan of care with covering medical team
complete patient's bedside assessment, review medical chart, discuss discharge medical plan of care with covering medical team
complete patient's bedside assessment, review medical chart, discuss medical plan of care with covering medical team
I have personally seen and examined this patient.  I have reviewed all pertinent clinical information and reviewed all relevant imaging and diagnostic studies personally.   I counseled the patient about diagnostic testing and treatment plan. All questions were answered.  I discussed recommendations with the primary team.
complete patient's bedside assessment, review medical chart, discuss medical plan of care with covering medical team
complete patient's bedside assessment, review medical chart, discuss medical plan of care with covering medical team

## 2021-04-29 NOTE — PROGRESS NOTE ADULT - SUBJECTIVE AND OBJECTIVE BOX
JORDIN GARCIA  71y Male    CHIEF COMPLAINT:    Patient is a 71y old  Male who presents with a chief complaint of COVID-19 (21 Apr 2021 10:53)      INTERVAL HPI/OVERNIGHT EVENTS:    Patient seen and examined. No acute events overnight. Remains on HFNC    ROS: All other systems are negative.    Vital Signs:    T(F): 96.5 (04-21-21 @ 08:12), Max: 98.1 (04-20-21 @ 20:20)  HR: 77 (04-21-21 @ 08:12) (70 - 89)  BP: 148/79 (04-21-21 @ 08:12) (132/70 - 169/70)  RR: 20 (04-21-21 @ 08:12) (20 - 20)  SpO2: 91% (04-21-21 @ 08:12) (91% - 98%)\    20 Apr 2021 07:01  -  21 Apr 2021 07:00  --------------------------------------------------------  IN: 480 mL / OUT: 0 mL / NET: 480 mL    PHYSICAL EXAM:    GENERAL:  NAD  SKIN: No rashes or lesions  HEENT: Atraumatic. Normocephalic.  NECK: Supple, No JVD. No lymphadenopathy.  PULMONARY: coarse breath sounds. No wheezing. No rales  CVS: Normal S1, S2. Rate and Rhythm are regular.   ABDOMEN/GI: Soft, Nontender, Nondistended; BS present  MSK: No clubbing or cyanosis  NEUROLOGIC: moves all extremities  PSYCH: Alert & oriented x 3, normal affect    Consultant(s) Notes Reviewed:  [x ] YES  [ ] NO  Care Discussed with Consultants/Other Providers [ x] YES  [ ] NO    LABS:                        13.3   9.83  )-----------( 343      ( 21 Apr 2021 07:39 )             39.0     135  |  103  |  41<H>  ----------------------------<  98  5.0   |  19  |  1.2    Ca    8.2<L>      21 Apr 2021 07:39  Mg     2.6     04-21    TPro  6.0  /  Alb  3.0<L>  /  TBili  0.9  /  DBili  x   /  AST  50<H>  /  ALT  29  /  AlkPhos  141<H>  04-21    Serum Pro-Brain Natriuretic Peptide: 256 pg/mL (04-14-21 @ 21:00)    RADIOLOGY & ADDITIONAL TESTS:  < from: Xray Chest 1 View- PORTABLE-Routine (Xray Chest 1 View- PORTABLE-Routine in AM.) (04.20.21 @ 05:48) >  IMPRESSION:    Stable bilateralopacities.  Tortuosity without change.    < end of copied text >    Imaging or report Personally Reviewed:  [x] YES  [ ] NO  EKG reviewed: [x] YES  [ ] NO    Medications:  Standing  allopurinol 300 milliGRAM(s) Oral daily  amLODIPine   Tablet 5 milliGRAM(s) Oral daily  aspirin enteric coated 81 milliGRAM(s) Oral daily  atorvastatin 80 milliGRAM(s) Oral at bedtime  chlorhexidine 4% Liquid 1 Application(s) Topical <User Schedule>  dexAMETHasone  Injectable 6 milliGRAM(s) IV Push daily  dicyclomine 10 milliGRAM(s) Oral daily  gemfibrozil 600 milliGRAM(s) Oral two times a day  heparin   Injectable 7500 Unit(s) SubCutaneous every 12 hours  melatonin 5 milliGRAM(s) Oral at bedtime  metoprolol tartrate 100 milliGRAM(s) Oral two times a day  pantoprazole    Tablet 40 milliGRAM(s) Oral before breakfast  ranolazine 1000 milliGRAM(s) Oral two times a day  sodium zirconium cyclosilicate 5 Gram(s) Oral every 12 hours  tamsulosin 0.4 milliGRAM(s) Oral at bedtime    PRN Meds  acetaminophen   Tablet .. 650 milliGRAM(s) Oral every 6 hours PRN            
JORDIN GARCIA  71y Male    CHIEF COMPLAINT:    Patient is a 71y old  Male who presents with a chief complaint of COVID-19 (22 Apr 2021 08:56)      INTERVAL HPI/OVERNIGHT EVENTS:    Patient seen and examined. No acute events overnight. remains on HFNC    ROS: All other systems are negative.    Vital Signs:    T(F): 98.6 (04-22-21 @ 07:35), Max: 98.6 (04-22-21 @ 07:35)  HR: 67 (04-22-21 @ 07:35) (63 - 103)  BP: 123/67 (04-22-21 @ 07:35) (123/67 - 145/76)  RR: 20 (04-22-21 @ 07:35) (18 - 20)  SpO2: 98% (04-22-21 @ 07:35) (93% - 99%)    Daily Height in cm: 175.26 (21 Apr 2021 15:20)      PHYSICAL EXAM:    GENERAL:  NAD  SKIN: No rashes or lesions  HEENT: Atraumatic. Normocephalic.    NECK: Supple, No JVD.    PULMONARY: CTA B/L. No wheezing. No rales  CVS: Normal S1, S2. Rate and Rhythm are regular.    ABDOMEN/GI: Soft, Nontender, Nondistended   MSK:  No edema B/L LE. No clubbing or cyanosis  NEUROLOGIC: moves all extremities  PSYCH: Alert & oriented x 3, normal affect    Consultant(s) Notes Reviewed:  [x ] YES  [ ] NO  Care Discussed with Consultants/Other Providers [ x] YES  [ ] NO    LABS:                        13.6   8.27  )-----------( 329      ( 22 Apr 2021 09:10 )             40.4     135  |  103  |  41<H>  ----------------------------<  98  5.0   |  19  |  1.2    Ca    8.2<L>      21 Apr 2021 07:39  Mg     2.6     04-21    TPro  6.0  /  Alb  3.0<L>  /  TBili  0.9  /  DBili  x   /  AST  50<H>  /  ALT  29  /  AlkPhos  141<H>  04-21    RADIOLOGY & ADDITIONAL TESTS:  Imaging or report Personally Reviewed:  [x] YES  [ ] NO  EKG reviewed: [x] YES  [ ] NO    Medications:  Standing  allopurinol 300 milliGRAM(s) Oral daily  amLODIPine   Tablet 5 milliGRAM(s) Oral daily  aspirin enteric coated 81 milliGRAM(s) Oral daily  atorvastatin 80 milliGRAM(s) Oral at bedtime  chlorhexidine 4% Liquid 1 Application(s) Topical <User Schedule>  dexAMETHasone  Injectable 6 milliGRAM(s) IV Push daily  dicyclomine 10 milliGRAM(s) Oral daily  gemfibrozil 600 milliGRAM(s) Oral two times a day  heparin   Injectable 7500 Unit(s) SubCutaneous every 12 hours  melatonin 5 milliGRAM(s) Oral at bedtime  metoprolol tartrate 100 milliGRAM(s) Oral two times a day  pantoprazole    Tablet 40 milliGRAM(s) Oral before breakfast  ranolazine 1000 milliGRAM(s) Oral two times a day  tamsulosin 0.4 milliGRAM(s) Oral at bedtime    PRN Meds  acetaminophen   Tablet .. 650 milliGRAM(s) Oral every 6 hours PRN            
JORDIN GARCIA  71y Male    CHIEF COMPLAINT:    Patient is a 71y old  Male who presents with a chief complaint of COVID-19 (23 Apr 2021 13:24)      INTERVAL HPI/OVERNIGHT EVENTS:    Patient seen and examined. No acute events overnight. Feels well, denies any complaints    ROS: All other systems are negative.    Vital Signs:    T(F): 97.2 (04-24-21 @ 05:00), Max: 97.2 (04-24-21 @ 05:00)  HR: 84 (04-24-21 @ 05:00) (79 - 84)  BP: 131/67 (04-24-21 @ 05:00) (130/72 - 135/72)  RR: 20 (04-24-21 @ 06:30) (18 - 20)  SpO2: 94% (04-24-21 @ 07:57) (93% - 95%)    23 Apr 2021 07:01  -  24 Apr 2021 07:00  --------------------------------------------------------  IN: 320 mL / OUT: 450 mL / NET: -130 mL    Daily Height in cm: 167.64 (23 Apr 2021 19:50)      PHYSICAL EXAM:    GENERAL:  NAD  SKIN: No rashes or lesions  HEENT: Atraumatic. Normocephalic.   NECK: Supple, No JVD. No lymphadenopathy.  PULMONARY: poor inspiratory effort. No wheezing. No rales  CVS: Normal S1, S2. Rate and Rhythm are regular.   ABDOMEN/GI: Soft, Nontender, Nondistended; BS present  MSK: no clubbing or cyanosis  NEUROLOGIC: moves all extremities  PSYCH: Alert & oriented x 3, normal affect    Consultant(s) Notes Reviewed:  [x ] YES  [ ] NO  Care Discussed with Consultants/Other Providers [ x] YES  [ ] NO    LABS:                        13.0   13.58 )-----------( 356      ( 24 Apr 2021 05:49 )             38.5     136  |  100  |  48<H>  ----------------------------<  72  5.8<H>   |  25  |  1.5    Ca    8.8      24 Apr 2021 05:49  Mg     2.6     04-24    TPro  6.2  /  Alb  3.2<L>  /  TBili  1.1  /  DBili  x   /  AST  100<H>  /  ALT  56<H>  /  AlkPhos  106  04-24    RADIOLOGY & ADDITIONAL TESTS:  Imaging or report Personally Reviewed:  [x] YES  [ ] NO  EKG reviewed: [x] YES  [ ] NO    Medications:  Standing  allopurinol 300 milliGRAM(s) Oral daily  amLODIPine   Tablet 5 milliGRAM(s) Oral daily  aspirin enteric coated 81 milliGRAM(s) Oral daily  atorvastatin 80 milliGRAM(s) Oral at bedtime  chlorhexidine 4% Liquid 1 Application(s) Topical <User Schedule>  dicyclomine 10 milliGRAM(s) Oral daily  gemfibrozil 600 milliGRAM(s) Oral two times a day  heparin   Injectable 7500 Unit(s) SubCutaneous every 12 hours  insulin regular  human recombinant 10 Unit(s) IV Push once  melatonin 5 milliGRAM(s) Oral at bedtime  metoprolol tartrate 100 milliGRAM(s) Oral two times a day  pantoprazole    Tablet 40 milliGRAM(s) Oral before breakfast  ranolazine 1000 milliGRAM(s) Oral two times a day  sodium zirconium cyclosilicate 10 Gram(s) Oral two times a day  tamsulosin 0.4 milliGRAM(s) Oral at bedtime    PRN Meds  acetaminophen   Tablet .. 650 milliGRAM(s) Oral every 6 hours PRN            
Patient is a 71y old  Male who presents with a chief complaint of COVID-19 (19 Apr 2021 10:18)        Over Night Events:  on HFNCO2 50 liters / 60%.  Off pressors.          ROS:     All ROS are negative except HPI         PHYSICAL EXAM    ICU Vital Signs Last 24 Hrs  T(C): 35.9 (19 Apr 2021 23:29), Max: 36.5 (19 Apr 2021 17:32)  T(F): 96.7 (19 Apr 2021 23:29), Max: 97.7 (19 Apr 2021 17:32)  HR: 63 (20 Apr 2021 05:18) (63 - 77)  BP: 178/95 (20 Apr 2021 05:18) (145/81 - 178/95)  BP(mean): 130 (20 Apr 2021 05:18) (108 - 130)  ABP: --  ABP(mean): --  RR: 20 (20 Apr 2021 05:18) (18 - 20)  SpO2: 95% (20 Apr 2021 05:18) (89% - 99%)      CONSTITUTIONAL:  Well nourished.  NAD    ENT:   Airway patent,   Mouth with normal mucosa.   No thrush    EYES:   Pupils equal,   Round and reactive to light.    CARDIAC:   Normal rate,   Regular rhythm.    No edema      Vascular:  Normal systolic impulse  No Carotid bruits    RESPIRATORY:   No wheezing  Bilateral crackles   Normal chest expansion  Not tachypneic,  No use of accessory muscles    GASTROINTESTINAL:  Abdomen soft,   Non-tender,   No guarding,   + BS    MUSCULOSKELETAL:   Range of motion is not limited,  No clubbing, cyanosis    NEUROLOGICAL:   Alert   No motor  deficits.    SKIN:   Skin normal color for race,   Warm and dry   No evidence of rash.    PSYCHIATRIC:   Normal mood and affect.   No apparent risk to self or others.    HEMATOLOGICAL:  No cervical  lymphadenopathy.  no inguinal lymphadenopathy      04-19-21 @ 07:01  -  04-20-21 @ 06:23  --------------------------------------------------------  IN:    sodium chloride 0.9%: 250 mL  Total IN: 250 mL    OUT:    Stool (mL): 2 mL    Voided (mL): 1 mL  Total OUT: 3 mL    Total NET: 247 mL          LABS:                            13.3   10.45 )-----------( 278      ( 19 Apr 2021 08:25 )             38.9                                               04-19    139  |  107  |  46<H>  ----------------------------<  99  5.0   |  19  |  1.4    Ca    8.2<L>      19 Apr 2021 08:25  Mg     2.8     04-19    TPro  6.3  /  Alb  3.0<L>  /  TBili  0.6  /  DBili  x   /  AST  59<H>  /  ALT  31  /  AlkPhos  113  04-19                                                                                           LIVER FUNCTIONS - ( 19 Apr 2021 08:25 )  Alb: 3.0 g/dL / Pro: 6.3 g/dL / ALK PHOS: 113 U/L / ALT: 31 U/L / AST: 59 U/L / GGT: x                                                                                                                                       MEDICATIONS  (STANDING):  allopurinol 300 milliGRAM(s) Oral daily  aspirin enteric coated 81 milliGRAM(s) Oral daily  atorvastatin 80 milliGRAM(s) Oral at bedtime  chlorhexidine 4% Liquid 1 Application(s) Topical <User Schedule>  dexAMETHasone  Injectable 6 milliGRAM(s) IV Push daily  dicyclomine 10 milliGRAM(s) Oral daily  gemfibrozil 600 milliGRAM(s) Oral two times a day  heparin   Injectable 7500 Unit(s) SubCutaneous every 12 hours  melatonin 5 milliGRAM(s) Oral at bedtime  metoprolol tartrate 100 milliGRAM(s) Oral two times a day  pantoprazole    Tablet 40 milliGRAM(s) Oral before breakfast  ranolazine 1000 milliGRAM(s) Oral two times a day  sodium chloride 0.9%. 1000 milliLiter(s) (50 mL/Hr) IV Continuous <Continuous>  tamsulosin 0.4 milliGRAM(s) Oral at bedtime    MEDICATIONS  (PRN):  acetaminophen   Tablet .. 650 milliGRAM(s) Oral every 6 hours PRN Temp greater or equal to 38C (100.4F), Moderate Pain (4 - 6)      New X-rays reviewed:                                                                                  ECHO    CXR interpreted by me:  Bilateral infiltrates     
Patient is a 71y old  Male who presents with a chief complaint of COVID-19 (22 Apr 2021 10:54)        Over Night Events:  On NC this am.  NO SOB at rest.          ROS:     All ROS are negative except HPI         PHYSICAL EXAM    ICU Vital Signs Last 24 Hrs  T(C): 35.8 (23 Apr 2021 05:57), Max: 37.1 (22 Apr 2021 11:46)  T(F): 96.5 (23 Apr 2021 05:57), Max: 98.7 (22 Apr 2021 11:46)  HR: 80 (23 Apr 2021 05:57) (63 - 96)  BP: 139/77 (23 Apr 2021 05:57) (123/67 - 140/74)  BP(mean): --  ABP: --  ABP(mean): --  RR: 20 (23 Apr 2021 05:57) (20 - 20)  SpO2: 95% (23 Apr 2021 05:57) (93% - 99%)      CONSTITUTIONAL:  Well nourished.  NAD    ENT:   Airway patent,   Mouth with normal mucosa.   No thrush    EYES:   Pupils equal,   Round and reactive to light.    CARDIAC:   Normal rate,   Regular rhythm.    No edema      Vascular:  Normal systolic impulse  No Carotid bruits    RESPIRATORY:   No wheezing  Bilateral BS  Normal chest expansion  Not tachypneic,  No use of accessory muscles    GASTROINTESTINAL:  Abdomen soft,   Non-tender,   No guarding,   + BS    MUSCULOSKELETAL:   Range of motion is not limited,  No clubbing, cyanosis    NEUROLOGICAL:   Alert   No motor  deficits.    SKIN:   Skin normal color for race,   Warm and dry  No evidence of rash.    PSYCHIATRIC:   Normal mood and affect.   No apparent risk to self or others.    HEMATOLOGICAL:  No cervical  lymphadenopathy.  no inguinal lymphadenopathy      04-22-21 @ 07:01  -  04-23-21 @ 06:39  --------------------------------------------------------  IN:    Oral Fluid: 440 mL  Total IN: 440 mL    OUT:  Total OUT: 0 mL    Total NET: 440 mL          LABS:                            13.6   8.27  )-----------( 329      ( 22 Apr 2021 09:10 )             40.4                                               04-23    132<L>  |  102  |  46<H>  ----------------------------<  60<L>  5.8<H>   |  21  |  1.5    Creatinine Trend  BUN 46, Cr 1.5, (04-23-21 @ 00:30)  Creatinine Trend  BUN 46, Cr 1.5, (04-22-21 @ 17:59)  Creatinine Trend  BUN 44, Cr 1.4, (04-22-21 @ 09:10)  Creatinine Trend  BUN 41, Cr 1.2, (04-21-21 @ 07:39)  Creatinine Trend  BUN 46, Cr 1.5, (04-20-21 @ 08:58)  Creatinine Trend  BUN 46, Cr 1.4, (04-19-21 @ 08:25)  Creatinine Trend  BUN 47, Cr 1.3, (04-18-21 @ 07:17)      Ca    8.6      23 Apr 2021 00:30  Mg     2.6     04-22    TPro  6.3  /  Alb  3.2<L>  /  TBili  1.1  /  DBili  x   /  AST  62<H>  /  ALT  38  /  AlkPhos  137<H>  04-22                                                                                           LIVER FUNCTIONS - ( 22 Apr 2021 09:10 )  Alb: 3.2 g/dL / Pro: 6.3 g/dL / ALK PHOS: 137 U/L / ALT: 38 U/L / AST: 62 U/L / GGT: x                                                                                                                                       MEDICATIONS  (STANDING):  allopurinol 300 milliGRAM(s) Oral daily  amLODIPine   Tablet 5 milliGRAM(s) Oral daily  aspirin enteric coated 81 milliGRAM(s) Oral daily  atorvastatin 80 milliGRAM(s) Oral at bedtime  chlorhexidine 4% Liquid 1 Application(s) Topical <User Schedule>  dextrose 50% Injectable 50 milliLiter(s) IV Push once  dicyclomine 10 milliGRAM(s) Oral daily  gemfibrozil 600 milliGRAM(s) Oral two times a day  heparin   Injectable 7500 Unit(s) SubCutaneous every 12 hours  insulin regular  human recombinant 10 Unit(s) IV Push once  melatonin 5 milliGRAM(s) Oral at bedtime  metoprolol tartrate 100 milliGRAM(s) Oral two times a day  pantoprazole    Tablet 40 milliGRAM(s) Oral before breakfast  ranolazine 1000 milliGRAM(s) Oral two times a day  sodium zirconium cyclosilicate 10 Gram(s) Oral every 8 hours  sodium zirconium cyclosilicate 10 Gram(s) Oral once  tamsulosin 0.4 milliGRAM(s) Oral at bedtime    MEDICATIONS  (PRN):  acetaminophen   Tablet .. 650 milliGRAM(s) Oral every 6 hours PRN Temp greater or equal to 38C (100.4F), Moderate Pain (4 - 6)      New X-rays reviewed:                                                                                  ECHO    CXR interpreted by me:      
Today is hospital day 13d.     INTERVAL HPI / OVERNIGHT EVENTS  Patient was examined and seen at bedside.   No OVNE.       PAST MEDICAL & SURGICAL HISTORY  CAD (coronary artery disease)  s/p CABG    High cholesterol    Chronic kidney disease, unspecified CKD stage    BPH (benign prostatic hyperplasia)    HTN (hypertension)    S/P CABG (coronary artery bypass graft)    Arm fracture, left  s/p repair      ALLERGIES  No Known Allergies    MEDICATIONS  STANDING MEDICATIONS  allopurinol 300 milliGRAM(s) Oral daily  amLODIPine   Tablet 5 milliGRAM(s) Oral daily  aspirin enteric coated 81 milliGRAM(s) Oral daily  atorvastatin 80 milliGRAM(s) Oral at bedtime  chlorhexidine 4% Liquid 1 Application(s) Topical <User Schedule>  dicyclomine 10 milliGRAM(s) Oral daily  enoxaparin Injectable 40 milliGRAM(s) SubCutaneous at bedtime  furosemide    Tablet 20 milliGRAM(s) Oral daily  gemfibrozil 600 milliGRAM(s) Oral two times a day  insulin regular  human recombinant 10 Unit(s) IV Push once  melatonin 5 milliGRAM(s) Oral at bedtime  metoprolol tartrate 100 milliGRAM(s) Oral two times a day  pantoprazole    Tablet 40 milliGRAM(s) Oral before breakfast  ranolazine 1000 milliGRAM(s) Oral two times a day  tamsulosin 0.4 milliGRAM(s) Oral at bedtime    PRN MEDICATIONS  acetaminophen   Tablet .. 650 milliGRAM(s) Oral every 6 hours PRN    VITALS:  T(F): 97.2  HR: 78  BP: 130/69  RR: 17  SpO2: 93%    PHYSICAL EXAM  GEN: NAD, Resting comfortably in bed  PULM: R lung base crackles/rales  CVS: Regular rate and rhythm, S1-S2, no murmurs/rubs/gallops, +2 pulses  ABD: Soft, non-tender, non-distended, no guarding  EXT: No edema  NEURO: A&Ox3, no focal deficits    LABS                        11.7   9.82  )-----------( 281      ( 2021 08:21 )             35.7     04-    136  |  104  |  26<H>  ----------------------------<  76  5.1<H>   |  22  |  1.1    Ca    8.7      2021 08:21  Mg     2.3         TPro  5.7<L>  /  Alb  3.2<L>  /  TBili  0.8  /  DBili  x   /  AST  242<H>  /  ALT  89<H>  /  AlkPhos  85        Urinalysis Basic - ( 2021 18:55 )    Color: Yellow / Appearance: Clear / S.027 / pH: x  Gluc: x / Ketone: Negative  / Bili: Negative / Urobili: <2 mg/dL   Blood: x / Protein: 30 mg/dL / Nitrite: Negative   Leuk Esterase: Negative / RBC: 5 /HPF / WBC 3 /HPF   Sq Epi: x / Non Sq Epi: 1 /HPF / Bacteria: Negative      RADIOLOGY    
VASCULAR SURGERY PROGRESS NOTE      CC: COVID+  Consult for TAAA on CTA    Sx: CTA done and reviewed    HPI:  Pt is a 71 year old male with PMHx of CAD s/p CABG ~10 years ago, HTN, BPH, HLD, CKD due to R sided renal disease? presents with SOB, cough, general, malaise, and decreased PO intake for past 4-5 days. Pt reports that family member tested positive fort COVID 3 weeks ago so he got tested and was positive as well. Symptoms were very mild up until 4-5 days ago.  x 5 days. Pulse ox at home today was in the 80s so he came to ED. No headache, fever, nausea, vomiting,  leg pain/swelling, change in bowel habits or urinary symptoms.     In the ED: satting in 80s on RA with mild tachypnea. Vitals otherwise wnl.  CBC with lymphopenia  Cr 1.9, unknown baseline  D-dimer 320  Trop x1, BNP wnl (15 Apr 2021 00:03)        PAST MEDICAL & SURGICAL HISTORY:  CAD (coronary artery disease)  s/p CABG    High cholesterol    Chronic kidney disease, unspecified CKD stage    BPH (benign prostatic hyperplasia)    HTN (hypertension)    S/P CABG (coronary artery bypass graft)    Arm fracture, left  s/p repair      No Known Allergies    Home Medications:  ALLOPURINOL  TAB 300M tab(s) orally once a day (15 Apr 2021 01:43)  aspirin 81 mg oral tablet: 1 tab(s) orally once a day (15 Apr 2021 01:43)  ATORVASTATIN 80 MG TABLET: TAKE 1 TABLET BY MOUTH EVERY DAY (15 Apr 2021 01:43)  DICYCLOMINE  CAP 10M cap(s) orally once a day (at bedtime) (15 Apr 2021 01:43)  EZETIMIBE    TAB 10M  orally once a day (15 Apr 2021 01:43)  FUROSEMIDE   TAB 20M tab(s) orally once a day (15 Apr 2021 01:43)  GEMFIBROZIL  TAB 600M tab(s) orally 2 times a day (15 Apr 2021 01:43)  LISINOPRIL   TAB 10M tab(s) orally once a day (15 Apr 2021 01:43)  METOPROL TAR TAB 100M tab(s) orally 2 times a day (15 Apr 2021 01:43)  paricalcitol 1 mcg oral capsule: 1 cap(s) orally once a day (15 Apr 2021 01:43)  RANOLAZINE   TAB 1000M tab(s) orally 2 times a day (15 Apr 2021 01:43)  TAMSULOSIN   CAP 0.4M cap(s) orally once a day (at bedtime) (15 Apr 2021 01:43)    No permtinent family history of PVD    REVIEW OF SYSTEMS:  GENERAL:                                         negative  SKIN:                                                 negative  OPTHALMOLOGIC:                          negative  ENMT:                                               negative  RESPIRATORY AND THORAX:        see HPI  CARDIOVASCULAR:                        see HPI  GASTROINTESTINAL:                       negative  NEPHROLOGY:                                  negative  MUSCULOSKELETAL:                       negative  NEUROLOGIC:                                   negative  PSYCHIATRIC:                                    negative  HEMATOLOGY/LYMPHATICS:         negative  ENDOCRINE:                                     negative  ALLERGIC/IMMUNOLOGIC:            negative    12 point ROS otherwise normal except as stated in HPI    PHYSICAL EXAM  Vital Signs Last 24 Hrs  T(C): 36.2 (2021 12:40), Max: 36.5 (2021 20:36)  T(F): 97.2 (2021 12:40), Max: 97.7 (2021 20:36)  HR: 78 (2021 12:40) (69 - 91)  BP: 130/69 (2021 12:40) (130/64 - 131/65)  BP(mean): --  RR: 17 (2021 12:40) (17 - 18)  SpO2: 93% (2021 12:40) (93% - 95%)    Appearance: Normal	  HEENT:   Normal oral mucosa, PERRL, EOMI	  Neck: Supple, - JVD;    Cardiovascular: Normal S1 S2, No JVD, No murmurs,   Respiratory: Lungs clear to auscultation, No Rales, Rhonchi, Wheezing	  Gastrointestinal:  Soft, Non-tender, positive BS	  Skin: No rashes, No ecchymoses, No cyanosis  Extremities: Normal range of motion, No clubbing, cyanosis or edema  Neurologic: Non-focal  Psychiatry: A & O x 3, Mood & affect appropriate          MEDICATIONS:   MEDICATIONS  (STANDING):  allopurinol 300 milliGRAM(s) Oral daily  amLODIPine   Tablet 5 milliGRAM(s) Oral daily  aspirin enteric coated 81 milliGRAM(s) Oral daily  atorvastatin 80 milliGRAM(s) Oral at bedtime  chlorhexidine 4% Liquid 1 Application(s) Topical <User Schedule>  dicyclomine 10 milliGRAM(s) Oral daily  enoxaparin Injectable 40 milliGRAM(s) SubCutaneous at bedtime  furosemide    Tablet 20 milliGRAM(s) Oral daily  gemfibrozil 600 milliGRAM(s) Oral two times a day  insulin regular  human recombinant 10 Unit(s) IV Push once  melatonin 5 milliGRAM(s) Oral at bedtime  metoprolol tartrate 100 milliGRAM(s) Oral two times a day  pantoprazole    Tablet 40 milliGRAM(s) Oral before breakfast  ranolazine 1000 milliGRAM(s) Oral two times a day  tamsulosin 0.4 milliGRAM(s) Oral at bedtime    MEDICATIONS  (PRN):  acetaminophen   Tablet .. 650 milliGRAM(s) Oral every 6 hours PRN Temp greater or equal to 38C (100.4F), Moderate Pain (4 - 6)      LAB/STUDIES:                        11.7   9.82  )-----------( 281      ( 2021 08:21 )             35.7         136  |  104  |  26<H>  ----------------------------<  76  5.1<H>   |  22  |  1.1    Ca    8.7      2021 08:21  Mg     2.3         TPro  5.7<L>  /  Alb  3.2<L>  /  TBili  0.8  /  DBili  x   /  AST  242<H>  /  ALT  89<H>  /  AlkPhos  85        LIVER FUNCTIONS - ( 2021 08:21 )  Alb: 3.2 g/dL / Pro: 5.7 g/dL / ALK PHOS: 85 U/L / ALT: 89 U/L / AST: 242 U/L / GGT: x             Urinalysis Basic - ( 2021 18:55 )    Color: Yellow / Appearance: Clear / S.027 / pH: x  Gluc: x / Ketone: Negative  / Bili: Negative / Urobili: <2 mg/dL   Blood: x / Protein: 30 mg/dL / Nitrite: Negative   Leuk Esterase: Negative / RBC: 5 /HPF / WBC 3 /HPF   Sq Epi: x / Non Sq Epi: 1 /HPF / Bacteria: Negative        IMAGING:      < from: CT Angio Chest w/ IV Cont (21 @ 14:42) >  1. Thoracic aortic aneurysm measuring 5.7 x 5.3 cm at T6-7 and 5.8 x 5.0 cm atthe diaphragmatic hiatus. 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    2. Right renal arteries both arise from false lumen with delayed right nephrogram.    3. Diffuse bilateral pulmonaryconsolidations, increased from prior examination.      
  Patient Information:  JORDIN GARCIA / 71y / Male / MRN#:[id number]    Hospital Day: 2d    Interval History:  Patient seen and examined at bedside. No acute events overnight. Pt reports that he feels well, has no difficulty breathing, has been ambulating to bathroom without issues.    Past Medical History:  CAD (coronary artery disease)    High cholesterol    Chronic kidney disease, unspecified CKD stage    BPH (benign prostatic hyperplasia)    HTN (hypertension)      Past Surgical History:  S/P CABG (coronary artery bypass graft)    Arm fracture, left      Allergies:  No Known Allergies    Medications:  PRN:    Standing:  allopurinol 300 milliGRAM(s) Oral daily  aspirin enteric coated 81 milliGRAM(s) Oral daily  atorvastatin 80 milliGRAM(s) Oral at bedtime  chlorhexidine 4% Liquid 1 Application(s) Topical <User Schedule>  dexAMETHasone  Injectable 6 milliGRAM(s) IV Push daily  dicyclomine 10 milliGRAM(s) Oral daily  gemfibrozil 600 milliGRAM(s) Oral two times a day  heparin   Injectable 7500 Unit(s) SubCutaneous every 12 hours  metoprolol tartrate 100 milliGRAM(s) Oral two times a day  ranolazine 1000 milliGRAM(s) Oral two times a day  sodium chloride 0.9%. 1000 milliLiter(s) (70 mL/Hr) IV Continuous <Continuous>  tamsulosin 0.4 milliGRAM(s) Oral at bedtime    Home:  ALLOPURINOL  TAB 300M tab(s) orally once a day  aspirin 81 mg oral tablet: 1 tab(s) orally once a day  ATORVASTATIN 80 MG TABLET: TAKE 1 TABLET BY MOUTH EVERY DAY  DICYCLOMINE  CAP 10M cap(s) orally once a day (at bedtime)  EZETIMIBE    TAB 10M  orally once a day  FUROSEMIDE   TAB 20M tab(s) orally once a day  GEMFIBROZIL  TAB 600M tab(s) orally 2 times a day  LISINOPRIL   TAB 10M tab(s) orally once a day  METOPROL TAR TAB 100M tab(s) orally 2 times a day  paricalcitol 1 mcg oral capsule: 1 cap(s) orally once a day  RANOLAZINE   TAB 1000M tab(s) orally 2 times a day  TAMSULOSIN   CAP 0.4M cap(s) orally once a day (at bedtime)    Vitals:  T(C): 36.4, Max: 36.4 (21 @ 05:50)  T(F): 97.6, Max: 97.6 (21 @ 05:50)  HR: 78 (58 - 78)  BP: 115/62 (90/57 - 115/62)  RR: 20 (18 - 20)  SpO2: 96% (94% - 96%)    Physical Exam:  General: Awake, alert, NAD, resting comfortably in bed, on O2 NC 3L  HEENT: Head NC/AT  Heart: RRR; no murmurs apprec  Lungs: Slightly dec breath sounds in bilateral bases  Abdomen: Obese, soft, nontender, nondistended, BS+  Extremities: No edema, clubbing, cyanosis in upper or lower extremities  Neuro: AAOx3, NFD    Labs:  CBC (04-15 @ 08:54)                        Hgb: 13.0   WBC: 7.45  )-----------------( Plts: 211                              Hct: 39.3     Chem (04-15 @ 08:54)  Na: 138  |     Cl: 102     |  BUN: 60  -----------------------------------------< Gluc: 151    K: 4.7   |    HCO3: 19  |  Cr: 1.9    Ca 9.1 (04-15 @ 08:54)  Phos 4.9 (04-15 @ 08:54)  Mg 2.3 (04-15 @ 08:54)    LFTs ( @ 21:00)  TPro 6.1  /  Alb 3.3  TBili 0.5  /  DBili     AST 40  /  ALT 17  /  AlkPhos 68    Cardiac Markers ( @ 21:00)  Troponin I X  Troponin T <0.01  CK X  CKMB X  CKMB Units X  Myoglobin X  Lactate X  ESR X        PT/INR ( @ 21:00)  PT: 13.90; INR: 1.21   PTT: 33.7         
  SUBJECTIVE  Patient is a 71y old Male who presents with a chief complaint of COVID-19 (20 Apr 2021 06:23)  Currently admitted to medicine with the primary diagnosis of COVID-19    Today is hospital day 6d, and this morning he is on HFNC  50/60 %.         OBJECTIVE  PAST MEDICAL & SURGICAL HISTORY  CAD (coronary artery disease)  s/p CABG    High cholesterol    Chronic kidney disease, unspecified CKD stage    BPH (benign prostatic hyperplasia)    HTN (hypertension)    S/P CABG (coronary artery bypass graft)    Arm fracture, left  s/p repair      ALLERGIES:  No Known Allergies    MEDICATIONS:  STANDING MEDICATIONS  allopurinol 300 milliGRAM(s) Oral daily  amLODIPine   Tablet 5 milliGRAM(s) Oral daily  aspirin enteric coated 81 milliGRAM(s) Oral daily  atorvastatin 80 milliGRAM(s) Oral at bedtime  chlorhexidine 4% Liquid 1 Application(s) Topical <User Schedule>  dexAMETHasone  Injectable 6 milliGRAM(s) IV Push daily  dicyclomine 10 milliGRAM(s) Oral daily  gemfibrozil 600 milliGRAM(s) Oral two times a day  heparin   Injectable 7500 Unit(s) SubCutaneous every 12 hours  melatonin 5 milliGRAM(s) Oral at bedtime  metoprolol tartrate 100 milliGRAM(s) Oral two times a day  pantoprazole    Tablet 40 milliGRAM(s) Oral before breakfast  ranolazine 1000 milliGRAM(s) Oral two times a day  tamsulosin 0.4 milliGRAM(s) Oral at bedtime    PRN MEDICATIONS  acetaminophen   Tablet .. 650 milliGRAM(s) Oral every 6 hours PRN      VITAL SIGNS: Last 24 Hours  T(C): 36.4 (20 Apr 2021 08:17), Max: 36.5 (19 Apr 2021 17:32)  T(F): 97.6 (20 Apr 2021 08:17), Max: 97.7 (19 Apr 2021 17:32)  HR: 78 (20 Apr 2021 08:17) (63 - 78)  BP: 155/85 (20 Apr 2021 08:17) (145/81 - 178/95)  BP(mean): 130 (20 Apr 2021 05:18) (108 - 130)  RR: 20 (20 Apr 2021 08:17) (18 - 20)  SpO2: 93% (20 Apr 2021 08:17) (93% - 99%)    LABS:                        13.3   10.45 )-----------( 278      ( 19 Apr 2021 08:25 )             38.9     04-19    139  |  107  |  46<H>  ----------------------------<  99  5.0   |  19  |  1.4    Ca    8.2<L>      19 Apr 2021 08:25  Mg     2.8     04-19    TPro  6.3  /  Alb  3.0<L>  /  TBili  0.6  /  DBili  x   /  AST  59<H>  /  ALT  31  /  AlkPhos  113  04-19                  RADIOLOGY:      PHYSICAL EXAM:    GENERAL: NAD, well-developed, AAOx3  HEENT:  Atraumatic, Normocephalic. EOMI, PERRLA, conjunctiva and sclera clear, No JVD  PULMONARY: Clear to auscultation bilaterally; No wheeze  CARDIOVASCULAR: Regular rate and rhythm; No murmurs, rubs, or gallops  GASTROINTESTINAL: Soft, Nontender, Nondistended; Bowel sounds present  MUSCULOSKELETAL:  2+ Peripheral Pulses, No clubbing, cyanosis, or edema  NEUROLOGY: non-focal  SKIN: No rashes or lesions      ADMISSION SUMMARY  72 yo M with PMHx of CAD s/p CABG 10 yrs ago, HTN, BPH, HLD, CKD presents with worsening dyspnea, malaise, admitted for acute hypoxemic respiratory failure due to COVID.    #Acute hypoxic respiratory failure secondary to COVID-19 pneumonia  COVID-19 PCR positive ~3 weeks ago. Symptoms mild until 4-5 days worsening SOB prior to admission.   -Chest X-ray 4/17: slightly increased bilateral opacities  -Patient desaturated upon ambulation 4/17 placed on 15LPM NRB > worsening hypoxia now on HFNC 50LPM, 60% FiO2   -inflammatory markers:   --d-dimer 320 4/14 > 345 4/17 > pending 4/19  -- 4/14 > 131 4/17 > pending 4/19  --ferritin 1593 4/14 > pending 4/18, 4/19  --procalc 0.36 4/14 > 0.23 4/17 > pending 4/19  - ID following - out of window for RDV, c/w Dexamethasone 6mg qD (day 6), s/p toci 600mg 4/18  - COVID AB = positive, no role for plasma  - Continue DXM.   -     #CKD with possible RUTH, unclear baseline Cr - improving  - Cr 1.9 on admission  - Pt sees Nephrologist on [facility], unsure of name  - Renal US revealed L renal cyst, no other abnormalities   - Home Lisinopril, Lasix on hold for now  - Cr 4/17 1.5 > 1.4 4/19, c/w gentle hydration NS @50cc/hr  - monitor I's and O's    #CAD s/p CABG 10 yrs ago  #HLD  - C/w Aspirin 81mg qD, Metoprolol 100mg BID, Ranolazine 1000mg BID  - C/w Atorvastatin 80mg qHS, Gemfibrozil 600mg BID    #BPH  - C/w Flomax 0.4mg qHS    #HTN  - Home Lisinopril, Lasix on hold due to Cr elevation  - c/w metoprolol 100mg BID    DVT ppx: Heparin SubQ 7500U BID  GI ppx: pantoprazole 40mg qD  Diet: DASH/TLC  PT/Rehab: Evaluation pending  Activity: Ambulate as tolerated  Dispo: Home pending clinical improvement  FULL CODE  Contact: Narda (daughter) [phone]  
<<<RESIDENT DISCHARGE NOTE>>>     JORDIN GARCIA  MRN-[id number]    VITAL SIGNS:  T(F): 97 (04-29-21 @ 13:49), Max: 97 (04-29-21 @ 05:00)  HR: 79 (04-29-21 @ 13:49)  BP: 113/64 (04-29-21 @ 13:49)  SpO2: 97% (04-29-21 @ 13:49)      PHYSICAL EXAMINATION:  GEN: NAD, Resting comfortably in bed  PULM: Clear to auscultation bilaterally, No wheezes  CVS: Regular rate and rhythm, S1-S2, no murmurs  ABD: Soft, non-tender, non-distended, no guarding  EXT: No edema  NEURO: AAOx3, no focal deficits    TEST RESULTS:                        11.9   8.60  )-----------( 270      ( 29 Apr 2021 08:06 )             36.1       04-29    138  |  103  |  30<H>  ----------------------------<  87  4.9   |  25  |  1.1    Ca    9.0      29 Apr 2021 08:06  Mg     2.1     04-29    TPro  5.7<L>  /  Alb  3.4<L>  /  TBili  0.7  /  DBili  x   /  AST  343<H>  /  ALT  128<H>  /  AlkPhos  68  04-29      FINAL DISCHARGE INTERVIEW:  Resident(s) Present: Dr. Timo Adams    DISCHARGE MEDICATION RECONCILIATION  reviewed with Attending (Name: )    DISPOSITION:   [ x ] Home,    [  ] Home with Visiting Nursing Services,   [  ]  SNF/ NH,    [  ] Acute Rehab (4A),   [  ] Other (Specify:_________)                         
JORDIN GARCIA  71y Male    CHIEF COMPLAINT:    Patient is a 71y old  Male who presents with a chief complaint of COVID-19 (23 Apr 2021 06:38)      INTERVAL HPI/OVERNIGHT EVENTS:    Patient seen and examined. No acute events overnight. No active complaints    ROS: All other systems are negative.    Vital Signs:    T(F): 96 (04-23-21 @ 08:31), Max: 98.7 (04-22-21 @ 11:46)  HR: 57 (04-23-21 @ 08:31) (57 - 96)  BP: 110/57 (04-23-21 @ 08:31) (110/57 - 140/74)  RR: 20 (04-23-21 @ 08:31) (20 - 20)  SpO2: 95% (04-23-21 @ 05:57) (93% - 98%)    22 Apr 2021 07:01  -  23 Apr 2021 07:00  --------------------------------------------------------  IN: 440 mL / OUT: 0 mL / NET: 440 mL    PHYSICAL EXAM:    GENERAL:  NAD  SKIN: No rashes or lesions  HEENT: Atraumatic. Normocephalic.  NECK: Supple, No JVD.   PULMONARY: CTA B/L. No wheezing. No rales  CVS: Normal S1, S2. Rate and Rhythm are regular.  ABDOMEN/GI: Soft, Nontender, Nondistended; BS present  MSK:  no clubbing or cyanosis  NEUROLOGIC: moves all extremities  PSYCH: Alert & oriented x 3, normal affect    Consultant(s) Notes Reviewed:  [x ] YES  [ ] NO  Care Discussed with Consultants/Other Providers [ x] YES  [ ] NO    LABS:                        12.7   13.96 )-----------( 389      ( 23 Apr 2021 04:30 )             38.1     137  |  103  |  43<H>  ----------------------------<  74  5.4<H>   |  23  |  1.4    Ca    8.7      23 Apr 2021 04:30  Mg     2.7     04-23    TPro  6.3  /  Alb  3.3<L>  /  TBili  1.0  /  DBili  x   /  AST  75<H>  /  ALT  43<H>  /  AlkPhos  123<H>  04-23    RADIOLOGY & ADDITIONAL TESTS:  c< from: Xray Chest 1 View- PORTABLE-Routine (Xray Chest 1 View- PORTABLE-Routine in AM.) (04.23.21 @ 07:07) >    Impression:    Apparently slightly increased right opacities may be due to differences in positioning and underaeration. Slightly decreased left opacities. No pneumothorax.    Redemonstrated tortuous aorta with aneurysmal dilatation.    < end of copied text >    Imaging or report Personally Reviewed:  [x] YES  [ ] NO  EKG reviewed: [x] YES  [ ] NO    Medications:  Standing  allopurinol 300 milliGRAM(s) Oral daily  amLODIPine   Tablet 5 milliGRAM(s) Oral daily  aspirin enteric coated 81 milliGRAM(s) Oral daily  atorvastatin 80 milliGRAM(s) Oral at bedtime  chlorhexidine 4% Liquid 1 Application(s) Topical <User Schedule>  dicyclomine 10 milliGRAM(s) Oral daily  gemfibrozil 600 milliGRAM(s) Oral two times a day  heparin   Injectable 7500 Unit(s) SubCutaneous every 12 hours  insulin regular  human recombinant 10 Unit(s) IV Push once  melatonin 5 milliGRAM(s) Oral at bedtime  metoprolol tartrate 100 milliGRAM(s) Oral two times a day  pantoprazole    Tablet 40 milliGRAM(s) Oral before breakfast  ranolazine 1000 milliGRAM(s) Oral two times a day  tamsulosin 0.4 milliGRAM(s) Oral at bedtime    PRN Meds  acetaminophen   Tablet .. 650 milliGRAM(s) Oral every 6 hours PRN            
[facility] FOLLOW UP NOTE  --------------------------------------------------------------------------------  24 hour events/subjective: Patient examined. Appears comfortable.    PAST HISTORY  --------------------------------------------------------------------------------  No significant changes to PMH, PSH, FHx, SHx, unless otherwise noted    ALLERGIES & MEDICATIONS  --------------------------------------------------------------------------------  Allergies    No Known Allergies    Standing Inpatient Medications  allopurinol 300 milliGRAM(s) Oral daily  amLODIPine   Tablet 5 milliGRAM(s) Oral daily  aspirin enteric coated 81 milliGRAM(s) Oral daily  atorvastatin 80 milliGRAM(s) Oral at bedtime  chlorhexidine 4% Liquid 1 Application(s) Topical <User Schedule>  dicyclomine 10 milliGRAM(s) Oral daily  gemfibrozil 600 milliGRAM(s) Oral two times a day  heparin   Injectable 7500 Unit(s) SubCutaneous every 12 hours  insulin regular  human recombinant 10 Unit(s) IV Push once  melatonin 5 milliGRAM(s) Oral at bedtime  metoprolol tartrate 100 milliGRAM(s) Oral two times a day  pantoprazole    Tablet 40 milliGRAM(s) Oral before breakfast  ranolazine 1000 milliGRAM(s) Oral two times a day  sodium chloride 0.9%. 1000 milliLiter(s) IV Continuous <Continuous>  sodium zirconium cyclosilicate 10 Gram(s) Oral two times a day  tamsulosin 0.4 milliGRAM(s) Oral at bedtime    PRN Inpatient Medications  acetaminophen   Tablet .. 650 milliGRAM(s) Oral every 6 hours PRN    VITALS/PHYSICAL EXAM  --------------------------------------------------------------------------------  T(C): 35.7 (04-25-21 @ 05:07), Max: 36.1 (04-24-21 @ 21:17)  HR: 75 (04-25-21 @ 05:07) (69 - 75)  BP: 116/68 (04-25-21 @ 05:07) (116/68 - 134/78)  RR: 18 (04-25-21 @ 07:41) (18 - 18)  SpO2: 96% (04-25-21 @ 07:41) (95% - 99%)  Height (cm): 167.6 (04-23-21 @ 19:50)  Weight (kg): 103 (04-23-21 @ 19:50)  BMI (kg/m2): 36.7 (04-23-21 @ 19:50)  BSA (m2): 2.11 (04-23-21 @ 19:50)    Physical Exam:  	Gen: NAD  	Pulm: CTA B/L  	CV: RRR, S1S2  	Abd: +BS, soft, nontender/nondistended  	: No suprapubic tenderness  	LE: Warm, no edema    LABS/STUDIES  --------------------------------------------------------------------------------              11.6   9.67  >-----------<  293      [04-25-21 @ 07:41]              35.0     133  |  103  |  37  ----------------------------<  87      [04-25-21 @ 07:41]  4.8   |  20  |  1.1        Ca     8.0     [04-25-21 @ 07:41]      Mg     2.3     [04-25-21 @ 07:41]    TPro  5.2  /  Alb  2.8  /  TBili  0.8  /  DBili  x   /  AST  112  /  ALT  55  /  AlkPhos  94  [04-25-21 @ 07:41]    Creatinine Trend:  SCr 1.1 [04-25 @ 07:41]  SCr 1.5 [04-24 @ 05:49]  SCr 1.4 [04-23 @ 04:30]  SCr 1.5 [04-23 @ 00:30]  SCr 1.5 [04-22 @ 17:59]    Ferritin 1387      [04-22-21 @ 09:10]
NEPHROLOGY FOLLOW UP NOTE    pt seen and examined  on 3L NC O2  off lasix and ace-I  bp's fair  s/p CTA     PAST MEDICAL & SURGICAL HISTORY:  CAD (coronary artery disease)  s/p CABG    High cholesterol    Chronic kidney disease, unspecified CKD stage    BPH (benign prostatic hyperplasia)    HTN (hypertension)    S/P CABG (coronary artery bypass graft)    Arm fracture, left  s/p repair      Allergies:  No Known Allergies    Home Medications Reviewed    SOCIAL HISTORY:  Denies ETOH,Smoking,   FAMILY HISTORY:  FH: prostate cancer (Father)    FH: uterine cancer (Mother)          REVIEW OF SYSTEMS:  CONSTITUTIONAL: No weakness, fevers or chills  EYES/ENT: No visual changes;  No vertigo or throat pain   NECK: No pain or stiffness  RESPIRATORY: No cough, wheezing, hemoptysis; No shortness of breath  CARDIOVASCULAR: No chest pain or palpitations.  GASTROINTESTINAL: No abdominal or epigastric pain. No nausea, vomiting, or hematemesis; No diarrhea or constipation. No melena or hematochezia.  GENITOURINARY: No dysuria, frequency, foamy urine, urinary urgency, incontinence or hematuria  NEUROLOGICAL: No numbness or weakness  SKIN: No itching, burning, rashes, or lesions   VASCULAR: No bilateral lower extremity edema.   All other review of systems is negative unless indicated above.    PHYSICAL EXAM:  Constitutional: NAD  HEENT: anicteric sclera, oropharynx clear, MMM, O2 NC  Neck: No JVD  Respiratory: CTAB, no wheezes, rales or rhonchi  Cardiovascular: S1, S2, RRR  Gastrointestinal: BS+, soft, NT/ND  Extremities: No cyanosis or clubbing. No peripheral edema  Neurological: A/O x 3, no focal deficits  Psychiatric: Normal mood, normal affect  : No CVA tenderness. No edwards.   Skin: No rashes    advance care planning and advance care directives reviewed     Hospital Medications:   MEDICATIONS  (STANDING):  allopurinol 300 milliGRAM(s) Oral daily  amLODIPine   Tablet 5 milliGRAM(s) Oral daily  aspirin enteric coated 81 milliGRAM(s) Oral daily  atorvastatin 80 milliGRAM(s) Oral at bedtime  chlorhexidine 4% Liquid 1 Application(s) Topical <User Schedule>  dicyclomine 10 milliGRAM(s) Oral daily  furosemide    Tablet 20 milliGRAM(s) Oral daily  gemfibrozil 600 milliGRAM(s) Oral two times a day  heparin   Injectable 7500 Unit(s) SubCutaneous every 12 hours  insulin regular  human recombinant 10 Unit(s) IV Push once  melatonin 5 milliGRAM(s) Oral at bedtime  metoprolol tartrate 100 milliGRAM(s) Oral two times a day  pantoprazole    Tablet 40 milliGRAM(s) Oral before breakfast  ranolazine 1000 milliGRAM(s) Oral two times a day  tamsulosin 0.4 milliGRAM(s) Oral at bedtime        VITALS:  T(F): 96.7 (04-26-21 @ 13:27), Max: 97.4 (04-25-21 @ 20:58)  HR: 74 (04-26-21 @ 13:27)  BP: 121/56 (04-26-21 @ 13:27)  RR: 18 (04-26-21 @ 13:27)  SpO2: 94% (04-26-21 @ 13:27)  Wt(kg): --        LABS:  04-26    138  |  106  |  26<H>  ----------------------------<  90  5.0   |  23  |  1.1    Ca    8.3<L>      26 Apr 2021 07:17  Mg     2.5     04-26    TPro  5.2<L>  /  Alb  2.9<L>  /  TBili  0.6  /  DBili      /  AST  148<H>  /  ALT  63<H>  /  AlkPhos  90  04-26                          11.5   8.55  )-----------( 280      ( 26 Apr 2021 07:17 )             35.3       Urine Studies:        RADIOLOGY & ADDITIONAL STUDIES:  
  JORDIN GARCIA  [facility] T2-3A 016 B ([facility] T2-3A)            Patient was evaluated and examined  by bedside, no active complains, no dyspnea , no cough          REVIEW OF SYSTEMS:  please see pertinent positives mentioned above, all other 12 ROS negative      T(C): , Max: 36.2 (04-27-21 @ 12:40)  HR: 79 (04-28-21 @ 05:00)  BP: 148/79 (04-28-21 @ 05:00)  RR: 18 (04-28-21 @ 11:49)  SpO2: 94% (04-28-21 @ 11:49)  CAPILLARY BLOOD GLUCOSE          PHYSICAL EXAM:  General: NAD, AAOX3, patient is laying comfortably in bed  HEENT: AT, NC, Supple, NO JVD, NO CB  Lungs: mild decreased breath sounds B/L, no wheezing, no rhonchi  CVS: normal S1, S2, RRR, NO M/G/R  Abdomen: soft, bowel sounds present, non-tender, non-distended  Extremities: no edema, no clubbing, no cyanosis, positive peripheral pulses b/l  Neuro: no acute focal neurological deficits  Skin: no rash, no ecchymosis      LAB  CBC  Date: 04-28-21 @ 07:02  Mean cell Hejuyetvlq14.7  Mean cell Hemoglobin Conc33.0  Mean cell Volum 96.2  Platelet count-Automate 298  RBC Count 3.94  Red Cell Distrib Width15.0  WBC Count9.15  % Albumin, Urine--  Hematocrit 37.9  Hemoglobin 12.5  CBC  Date: 04-27-21 @ 08:21  Mean cell Urufcfkzbl03.4  Mean cell Hemoglobin Conc32.8  Mean cell Volum 95.7  Platelet count-Automate 281  RBC Count 3.73  Red Cell Distrib Width14.6  WBC Count9.82  % Albumin, Urine--  Hematocrit 35.7  Hemoglobin 11.7  CBC  Date: 04-26-21 @ 07:17  Mean cell Sdbgtvjxpb75.9  Mean cell Hemoglobin Conc32.6  Mean cell Volum 94.9  Platelet count-Automate 280  RBC Count 3.72  Red Cell Distrib Width14.5  WBC Count8.55  % Albumin, Urine--  Hematocrit 35.3  Hemoglobin 11.5  CBC  Date: 04-25-21 @ 07:41  Mean cell Mvbvnzjopp97.4  Mean cell Hemoglobin Conc33.1  Mean cell Volum 94.6  Platelet count-Automate 293  RBC Count 3.70  Red Cell Distrib Width14.3  WBC Count9.67  % Albumin, Urine--  Hematocrit 35.0  Hemoglobin 11.6  CBC  Date: 04-24-21 @ 05:49  Mean cell Bjnxmgxhec43.8  Mean cell Hemoglobin Conc33.8  Mean cell Volum 94.1  Platelet count-Automate 356  RBC Count 4.09  Red Cell Distrib Width14.2  WBC Count13.58  % Albumin, Urine--  Hematocrit 38.5  Hemoglobin 13.0  CBC  Date: 04-23-21 @ 04:30  Mean cell Ikckakfhil83.1  Mean cell Hemoglobin Conc33.3  Mean cell Volum 93.4  Platelet count-Automate 389  RBC Count 4.08  Red Cell Distrib Width14.1  WBC Count13.96  % Albumin, Urine--  Hematocrit 38.1  Hemoglobin 12.7  CBC  Date: 04-22-21 @ 09:10  Mean cell Oovjsvyuxk83.3  Mean cell Hemoglobin Conc33.7  Mean cell Volum 92.9  Platelet count-Automate 329  RBC Count 4.35  Red Cell Distrib Width14.0  WBC Count8.27  % Albumin, Urine--  Hematocrit 40.4  Hemoglobin 13.6    [facility]  04-28-21 @ 07:02  Blood Gas Arterial-Calcium,Ionized--  Blood Urea Nitrogen, Serum 30 mg/dL<H> [10 - 20]  Carbon Dioxide, Serum25 mmol/L [17 - 32]  Chloride, Tjcik233 mmol/L [98 - 110]  Creatinie, Serum1.2 mg/dL [0.7 - 1.5]  Glucose, Serum85 mg/dL [70 - 99]  Potassium, Serum5.3 mmol/L<H> [3.5 - 5.0]  Sodium, Serum 135 mmol/L [135 - 146]  [facility]  04-27-21 @ 08:21  Blood Gas Arterial-Calcium,Ionized--  Blood Urea Nitrogen, Serum 26 mg/dL<H> [10 - 20]  Carbon Dioxide, Serum22 mmol/L [17 - 32]  Chloride, Ybdqw777 mmol/L [98 - 110]  Creatinie, Serum1.1 mg/dL [0.7 - 1.5]  Glucose, Serum76 mg/dL [70 - 99]  Potassium, Serum5.1 mmol/L<H> [3.5 - 5.0]  Sodium, Serum 136 mmol/L [135 - 146]  [facility]  04-26-21 @ 07:17  Blood Gas Arterial-Calcium,Ionized--  Blood Urea Nitrogen, Serum 26 mg/dL<H> [10 - 20]  Carbon Dioxide, Serum23 mmol/L [17 - 32]  Chloride, Phkml229 mmol/L [98 - 110]  Creatinie, Serum1.1 mg/dL [0.7 - 1.5]  Glucose, Serum90 mg/dL [70 - 99]  Potassium, Serum5.0 mmol/L [3.5 - 5.0]  Sodium, Serum 138 mmol/L [135 - 146]  [facility]  04-25-21 @ 07:41  Blood Gas Arterial-Calcium,Ionized--  Blood Urea Nitrogen, Serum 37 mg/dL<H> [10 - 20]  Carbon Dioxide, Serum20 mmol/L [17 - 32]  Chloride, [id number] mmol/L [98 - 110]  Creatinie, Serum1.1 mg/dL [0.7 - 1.5]  Glucose, Serum87 mg/dL [70 - 99]  Potassium, Serum4.8 mmol/L [3.5 - 5.0]  Sodium, Serum 133 mmol/L<L> [135 - 146]  [facility]  04-24-21 @ 05:49  Blood Gas Arterial-Calcium,Ionized--  Blood Urea Nitrogen, Serum 48 mg/dL<H> [10 - 20]  Carbon Dioxide, Serum25 mmol/L [17 - 32]  Chloride, Bldyn862 mmol/L [98 - 110]  Creatinie, Serum1.5 mg/dL [0.7 - 1.5]  Glucose, Serum72 mg/dL [70 - 99]  Potassium, Serum5.8 mmol/L<H> [3.5 - 5.0]  Sodium, Serum 136 mmol/L [135 - 146]      Medications:  acetaminophen   Tablet .. 650 milliGRAM(s) Oral every 6 hours PRN  allopurinol 300 milliGRAM(s) Oral daily  amLODIPine   Tablet 5 milliGRAM(s) Oral daily  aspirin enteric coated 81 milliGRAM(s) Oral daily  atorvastatin 80 milliGRAM(s) Oral at bedtime  chlorhexidine 4% Liquid 1 Application(s) Topical <User Schedule>  dicyclomine 10 milliGRAM(s) Oral daily  enoxaparin Injectable 40 milliGRAM(s) SubCutaneous at bedtime  furosemide    Tablet 20 milliGRAM(s) Oral daily  gemfibrozil 600 milliGRAM(s) Oral two times a day  insulin regular  human recombinant 10 Unit(s) IV Push once  melatonin 5 milliGRAM(s) Oral at bedtime  metoprolol tartrate 100 milliGRAM(s) Oral two times a day  pantoprazole    Tablet 40 milliGRAM(s) Oral before breakfast  ranolazine 1000 milliGRAM(s) Oral two times a day  tamsulosin 0.4 milliGRAM(s) Oral at bedtime        Assessment and Plan:  Pt is a 71 year old male with PMHx of CAD s/p CABG ~10 years ago, HTN, BPH, HLD, CKD ,  presents with SOB, cough, general, malaise, and decreased PO intake for 5 days prior to presentation. Currently in CEU on 6L nasal cannula    Acute Hypoxic respiratory failure due to severe COVID 19 PNA  Cytokine storm  Transaminitis  currently patient feeling good, no dyspnea at rest or during ambulation, on 3L nasal cannula saturating 90%  NIV at bedtime and PRN, patient ambulates to the restroom independently   s/p Toci 4/18, not a candidate for plasma or RDV  s/p 10 day course of decadron  Ddimer 768---> 3100--->1779--> 2000  c/w Heparin 7500 Q12H  LE duplex 4/22 negative for DVT  off abx, procal 0.1        Tortuous Aorta/Aneysmal dilatation on chest xray  worsening as compared to prior  Patient without symptoms  CT chest abdomen without contrast 4/23: 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 Abdominal aortic aneurysm, maximally 4.9 cm at the diaphragmatic hiatus.  Displaced intimal calcifications are suspicious for aortic dissection, age-indeterminate but potentially chronic. Note limited evaluation of the vasculature on noncontrast imaging  Vascular on board, s/p CTA chest/A/P  Discussed results with vascular team, will need outpatient f/up     RUTH on CKD stage 3  Hyperkalemia - resolved  Cr stable,  peaked at 1.9 and K is 5.8  resumed on  lasix   holding ACE, Lokelma 10g PRN for K >5.5  renal following     CAD s/p CABG ~10 years ago  HTN  DLD  continue asa 81, metoprolol, Ranolazine  continue with statin, gemfibrozil, Ezetimibe nonformulary  holding lisinopril   Cardiology following     #Progress Note Handoff: Patient medically optimized, will need home oxygen tx. arrangement  Family discussion: yes, medical team Disposition: Home__once home oxygen tx. is arranged.       
JORDIN GARCIA  71y, Male  Allergy: No Known Allergies      LOS  4d    CHIEF COMPLAINT: COVID-19 (18 Apr 2021 02:06)      INTERVAL EVENTS/HPI  - HFNC  - T(F): , Max: 95.6 (04-17-21 @ 14:32)  - WBC Count: 11.12 (04-18-21 @ 07:17)  WBC Count: 13.41 (04-17-21 @ 06:34)     - Creatinine, Serum: 1.3 (04-18-21 @ 07:17)  Creatinine, Serum: 1.5 (04-17-21 @ 06:34)       COVID-19 Matti Domain AB Interp: Positive (04-15-21 @ 11:45)  COVID-19 Nucleocapsid SHAYY AB Interp: Negative (04-15-21 @ 11:45)      ROS:  9-point ROS performed and negative except as per above    VITALS:  T(F): 95.2, Max: 95.6 (04-17-21 @ 14:32)  HR: 80  BP: 146/75  RR: 18Vital Signs Last 24 Hrs  T(C): 35.1 (18 Apr 2021 05:57), Max: 35.3 (17 Apr 2021 14:32)  T(F): 95.2 (18 Apr 2021 05:57), Max: 95.6 (17 Apr 2021 14:32)  HR: 80 (18 Apr 2021 05:57) (75 - 80)  BP: 146/75 (18 Apr 2021 05:57) (137/73 - 173/82)  BP(mean): --  RR: 18 (18 Apr 2021 05:57) (18 - 20)  SpO2: 100% (18 Apr 2021 09:50) (77% - 100%)    FH: Non-contributory  Social Hx: Non-contributory    TESTS & MEASUREMENTS:                        13.0   11.12 )-----------( 216      ( 18 Apr 2021 07:17 )             38.8     04-18    138  |  107  |  47<H>  ----------------------------<  96  5.1<H>   |  17  |  1.3    Ca    8.2<L>      18 Apr 2021 07:17    TPro  6.1  /  Alb  3.1<L>  /  TBili  0.4  /  DBili  x   /  AST  70<H>  /  ALT  32  /  AlkPhos  101  04-18    eGFR if Non African American: 55 mL/min/1.73M2 (04-18-21 @ 07:17)  eGFR if : 64 mL/min/1.73M2 (04-18-21 @ 07:17)    LIVER FUNCTIONS - ( 18 Apr 2021 07:17 )  Alb: 3.1 g/dL / Pro: 6.1 g/dL / ALK PHOS: 101 U/L / ALT: 32 U/L / AST: 70 U/L / GGT: x                     INFECTIOUS DISEASES TESTING  Procalcitonin, Serum: 0.23 (04-17-21 @ 06:34)  COVID-19 PCR: Detected (04-14-21 @ 21:00)  Procalcitonin, Serum: 0.36 (04-14-21 @ 21:00)      INFLAMMATORY MARKERS  C-Reactive Protein, Serum: 131 mg/L (04-17-21 @ 06:34)  C-Reactive Protein, Serum: 243 mg/L (04-14-21 @ 21:00)      RADIOLOGY & ADDITIONAL TESTS:  I have personally reviewed the last available Chest xray  CXR  Xray Chest 1 View- PORTABLE-Urgent:   EXAM:  XR CHEST PORTABLE URGENT 1V            PROCEDURE DATE:  04/17/2021            INTERPRETATION:  STUDY INDICATION: Covid Eval    Comparison: 4/14/2021.    Technique/Positioning: Frontal view of the chest.    Findings:    Support devices: None.    Cardiac/mediastinum/hilum: Stable appearance of the cardiomediastinal silhouette.    Lung parenchyma/Pleura: Slightly increased bilateral mid to lower lung and peripheral predominant opacities. No pneumothorax.    Skeleton/soft tissues: Unchanged.    Impression:    Slightly increased bilateral mid to lower lung and peripheral predominant opacities. No pneumothorax.              SAMEER HANDLEY MD; Attending Radiologist  This document has been electronically signed. Apr 17 2021  3:50PM (04-17-21 @ 09:30)      CT      CARDIOLOGY TESTING  12 Lead ECG:   Ventricular Rate 100 BPM    Atrial Rate 100 BPM    P-R Interval 136 ms    QRS Duration 74 ms    Q-T Interval 322 ms    QTC Calculation(Bazett) 415 ms    P Axis 45 degrees    R Axis 45 degrees    T Axis -10 degrees    Diagnosis Line Normal sinus rhythm  Normal ECG    Confirmed by Alberto Martino ([id number]) on 4/15/2021 7:44:38 AM (04-14-21 @ 22:16)      MEDICATIONS  allopurinol 300 Oral daily  aspirin enteric coated 81 Oral daily  atorvastatin 80 Oral at bedtime  chlorhexidine 4% Liquid 1 Topical <User Schedule>  dexAMETHasone  Injectable 6 IV Push daily  dicyclomine 10 Oral daily  gemfibrozil 600 Oral two times a day  heparin   Injectable 7500 SubCutaneous every 12 hours  metoprolol tartrate 100 Oral two times a day  pantoprazole    Tablet 40 Oral before breakfast  ranolazine 1000 Oral two times a day  tamsulosin 0.4 Oral at bedtime      WEIGHT    Creatinine, Serum: 1.3 mg/dL (04-18-21 @ 07:17)      ANTIBIOTICS:      All available historical records have been reviewed      
SUBJ: Short of breath      MEDICATIONS  (STANDING):  allopurinol 300 milliGRAM(s) Oral daily  amLODIPine   Tablet 5 milliGRAM(s) Oral daily  aspirin enteric coated 81 milliGRAM(s) Oral daily  atorvastatin 80 milliGRAM(s) Oral at bedtime  chlorhexidine 4% Liquid 1 Application(s) Topical <User Schedule>  dexAMETHasone  Injectable 6 milliGRAM(s) IV Push daily  dicyclomine 10 milliGRAM(s) Oral daily  gemfibrozil 600 milliGRAM(s) Oral two times a day  heparin   Injectable 7500 Unit(s) SubCutaneous every 12 hours  melatonin 5 milliGRAM(s) Oral at bedtime  metoprolol tartrate 100 milliGRAM(s) Oral two times a day  pantoprazole    Tablet 40 milliGRAM(s) Oral before breakfast  ranolazine 1000 milliGRAM(s) Oral two times a day  sodium zirconium cyclosilicate 5 Gram(s) Oral every 12 hours  tamsulosin 0.4 milliGRAM(s) Oral at bedtime    MEDICATIONS  (PRN):  acetaminophen   Tablet .. 650 milliGRAM(s) Oral every 6 hours PRN Temp greater or equal to 38C (100.4F), Moderate Pain (4 - 6)            Vital Signs Last 24 Hrs  T(C): 35.8 (21 Apr 2021 08:12), Max: 36.7 (20 Apr 2021 20:20)  T(F): 96.5 (21 Apr 2021 08:12), Max: 98.1 (20 Apr 2021 20:20)  HR: 77 (21 Apr 2021 08:12) (70 - 89)  BP: 148/79 (21 Apr 2021 08:12) (132/70 - 169/70)  BP(mean): --  RR: 20 (21 Apr 2021 08:12) (20 - 20)  SpO2: 91% (21 Apr 2021 08:12) (91% - 98%)     REVIEW OF SYSTEMS:  CONSTITUTIONAL: No fever, weight loss, or fatigue  CARDIOLOGY: PAtient denies chest pain, shortness of breath or syncopal episodes.   RESPIRATORY: denies shortness of breath, wheezeing.   NEUROLOGICAL: NO weakness, no focal deficits to report.  ENDOCRINOLOGICAL: no recent change in diabetic medications.   GI: no BRBPR, no N,V,diarrhea.    PSYCHIATRY: normal mood and affect  HEENT: no nasal discharge, no ecchymosis  SKIN: no ecchymosis, no breakdown  MUSCULOSKELETAL: Full range of motion x4.        PHYSICAL EXAM:  · CONSTITUTIONAL:	Well-developed, well nourished    BMI-  ·RESPIRATORY:   airway patent; breath sounds equal; good air movement; respirations non-labored; clear to auscultation bilaterally; no chest wall tenderness; no intercostal retractions; no rales,rhonchi or wheeze  · CARDIOVASCULAR	regular rate and rhythm  no rub  no murmur  normal PMI  · EXTREMITIES: No cyanosis, clubbing or edema  · VASCULAR: 	Equal and normal pulses (carotid, femoral, dorsalis pedis)  	  TELEMETRY:    ECG:    TTE:    LABS:                        13.3   9.83  )-----------( 343      ( 21 Apr 2021 07:39 )             39.0     04-21    135  |  103  |  41<H>  ----------------------------<  98  5.0   |  19  |  1.2    Ca    8.2<L>      21 Apr 2021 07:39  Mg     2.6     04-21    TPro  6.0  /  Alb  3.0<L>  /  TBili  0.9  /  DBili  x   /  AST  50<H>  /  ALT  29  /  AlkPhos  141<H>  04-21            I&O's Summary    20 Apr 2021 07:01  -  21 Apr 2021 07:00  --------------------------------------------------------  IN: 480 mL / OUT: 0 mL / NET: 480 mL      BNP  RADIOLOGY & ADDITIONAL STUDIES:    IMPRESSION AND PLAN:        
SUBJ: Still short of breath, but better      MEDICATIONS  (STANDING):  allopurinol 300 milliGRAM(s) Oral daily  amLODIPine   Tablet 5 milliGRAM(s) Oral daily  aspirin enteric coated 81 milliGRAM(s) Oral daily  atorvastatin 80 milliGRAM(s) Oral at bedtime  chlorhexidine 4% Liquid 1 Application(s) Topical <User Schedule>  dicyclomine 10 milliGRAM(s) Oral daily  gemfibrozil 600 milliGRAM(s) Oral two times a day  heparin   Injectable 7500 Unit(s) SubCutaneous every 12 hours  insulin regular  human recombinant 10 Unit(s) IV Push once  melatonin 5 milliGRAM(s) Oral at bedtime  metoprolol tartrate 100 milliGRAM(s) Oral two times a day  pantoprazole    Tablet 40 milliGRAM(s) Oral before breakfast  ranolazine 1000 milliGRAM(s) Oral two times a day  tamsulosin 0.4 milliGRAM(s) Oral at bedtime    MEDICATIONS  (PRN):  acetaminophen   Tablet .. 650 milliGRAM(s) Oral every 6 hours PRN Temp greater or equal to 38C (100.4F), Moderate Pain (4 - 6)            Vital Signs Last 24 Hrs  T(C): 35.6 (23 Apr 2021 08:31), Max: 36.6 (22 Apr 2021 16:35)  T(F): 96 (23 Apr 2021 08:31), Max: 97.9 (22 Apr 2021 16:35)  HR: 57 (23 Apr 2021 08:31) (57 - 96)  BP: 110/57 (23 Apr 2021 08:31) (110/57 - 139/77)  BP(mean): 78 (23 Apr 2021 08:31) (78 - 78)  RR: 20 (23 Apr 2021 08:31) (20 - 20)  SpO2: 95% (23 Apr 2021 05:57) (93% - 98%)     REVIEW OF SYSTEMS:  CONSTITUTIONAL: No fever, weight loss, or fatigue  CARDIOLOGY: PAtient denies chest pain, shortness of breath or syncopal episodes.   RESPIRATORY: denies shortness of breath, wheezeing.   NEUROLOGICAL: NO weakness, no focal deficits to report.  ENDOCRINOLOGICAL: no recent change in diabetic medications.   GI: no BRBPR, no N,V,diarrhea.    PSYCHIATRY: normal mood and affect  HEENT: no nasal discharge, no ecchymosis  SKIN: no ecchymosis, no breakdown  MUSCULOSKELETAL: Full range of motion x4.        PHYSICAL EXAM:  · CONSTITUTIONAL:	Well-developed, well nourished    BMI-  ·RESPIRATORY:   airway patent; breath sounds equal; good air movement; respirations non-labored; clear to auscultation bilaterally; no chest wall tenderness; no intercostal retractions; no rales,rhonchi or wheeze  · CARDIOVASCULAR	regular rate and rhythm  no rub  no murmur  normal PMI  · EXTREMITIES: No cyanosis, clubbing or edema  · VASCULAR: 	Equal and normal pulses (carotid, femoral, dorsalis pedis)  	  TELEMETRY:    ECG:    TTE:    LABS:                        12.7   13.96 )-----------( 389      ( 23 Apr 2021 04:30 )             38.1     04-23    137  |  103  |  43<H>  ----------------------------<  74  5.4<H>   |  23  |  1.4    Ca    8.7      23 Apr 2021 04:30  Mg     2.7     04-23    TPro  6.3  /  Alb  3.3<L>  /  TBili  1.0  /  DBili  x   /  AST  75<H>  /  ALT  43<H>  /  AlkPhos  123<H>  04-23            I&O's Summary    22 Apr 2021 07:01  -  23 Apr 2021 07:00  --------------------------------------------------------  IN: 440 mL / OUT: 0 mL / NET: 440 mL      BNP  RADIOLOGY & ADDITIONAL STUDIES:    IMPRESSION AND PLAN:    CKD better  CT reviewed, patient have a chronic Type B aortic dissection which is stable  Covid PNA   DVT prophyllaxis    
  SUBJECTIVE  Patient is a 71y old Male who presents with a chief complaint of COVID-19 (20 Apr 2021 09:05)  Currently admitted to medicine with the primary diagnosis of COVID-19    Today is hospital day 7d, and this morning he is on HFNC 50/60% and reports no overnight events.       OBJECTIVE  PAST MEDICAL & SURGICAL HISTORY  CAD (coronary artery disease)  s/p CABG    High cholesterol    Chronic kidney disease, unspecified CKD stage    BPH (benign prostatic hyperplasia)    HTN (hypertension)    S/P CABG (coronary artery bypass graft)    Arm fracture, left  s/p repair      ALLERGIES:  No Known Allergies    MEDICATIONS:  STANDING MEDICATIONS  allopurinol 300 milliGRAM(s) Oral daily  amLODIPine   Tablet 5 milliGRAM(s) Oral daily  aspirin enteric coated 81 milliGRAM(s) Oral daily  atorvastatin 80 milliGRAM(s) Oral at bedtime  chlorhexidine 4% Liquid 1 Application(s) Topical <User Schedule>  dexAMETHasone  Injectable 6 milliGRAM(s) IV Push daily  dicyclomine 10 milliGRAM(s) Oral daily  gemfibrozil 600 milliGRAM(s) Oral two times a day  heparin   Injectable 7500 Unit(s) SubCutaneous every 12 hours  melatonin 5 milliGRAM(s) Oral at bedtime  metoprolol tartrate 100 milliGRAM(s) Oral two times a day  pantoprazole    Tablet 40 milliGRAM(s) Oral before breakfast  ranolazine 1000 milliGRAM(s) Oral two times a day  sodium zirconium cyclosilicate 5 Gram(s) Oral every 12 hours  tamsulosin 0.4 milliGRAM(s) Oral at bedtime    PRN MEDICATIONS  acetaminophen   Tablet .. 650 milliGRAM(s) Oral every 6 hours PRN      VITAL SIGNS: Last 24 Hours  T(C): 36.1 (21 Apr 2021 05:38), Max: 36.7 (20 Apr 2021 20:20)  T(F): 97 (21 Apr 2021 05:38), Max: 98.1 (20 Apr 2021 20:20)  HR: 70 (21 Apr 2021 05:38) (70 - 89)  BP: 163/83 (21 Apr 2021 05:38) (132/70 - 169/70)  BP(mean): --  RR: 20 (21 Apr 2021 05:38) (20 - 20)  SpO2: 94% (21 Apr 2021 05:38) (93% - 98%)    LABS:                        13.7   10.58 )-----------( 273      ( 20 Apr 2021 08:58 )             40.7     04-20    139  |  107  |  46<H>  ----------------------------<  135<H>  5.4<H>   |  20  |  1.5    Ca    8.4<L>      20 Apr 2021 08:58  Mg     2.8     04-20    TPro  6.2  /  Alb  2.9<L>  /  TBili  0.7  /  DBili  x   /  AST  49<H>  /  ALT  31  /  AlkPhos  123<H>  04-20                  RADIOLOGY:      PHYSICAL EXAM:    GENERAL: NAD, well-developed, AAOx3  HEENT:  Atraumatic, Normocephalic. EOMI, PERRLA, conjunctiva and sclera clear, No JVD  PULMONARY: presence of B/L crackles  CARDIOVASCULAR: Regular rate and rhythm; No murmurs, rubs, or gallops  GASTROINTESTINAL: Soft, Nontender, Nondistended; Bowel sounds present  MUSCULOSKELETAL:  2+ Peripheral Pulses, No clubbing, cyanosis, or edema  NEUROLOGY: non-focal  SKIN: No rashes or lesions      ADMISSION SUMMARY  72 yo M with PMHx of CAD s/p CABG 10 yrs ago, HTN, BPH, HLD, CKD presents with worsening dyspnea, malaise, admitted for acute hypoxemic respiratory failure due to COVID.    #Acute hypoxic respiratory failure secondary to COVID-19 pneumonia  COVID-19 PCR positive ~3 weeks ago. Symptoms mild until 4-5 days worsening SOB prior to admission.   -Chest X-ray 4/17: slightly increased bilateral opacities  -Patient desaturated upon ambulation 4/17 placed on 15LPM NRB > worsening hypoxia now on HFNC 50LPM, 60% FiO2   -inflammatory markers:   --d-dimer 320 4/14 > 345 4/17 > 768  -- 4/14 > 131 4/17 >126  --ferritin 1593 4/14 > 2329 > 2016  --procalc 0.36 4/14 > 0.23 4/17 > 0.1  - ID following - out of window for RDV, c/w Dexamethasone 6mg qD for a total of 10 days, s/p toci 600mg 4/18  - COVID AB = positive, no role for plasma      #CKD with possible RUTH, unclear baseline Cr - improving  - Cr 1.9 on admission  - Pt sees Nephrologist on [place], unsure of name  - Renal US revealed L renal cyst, no other abnormalities   - Home Lisinopril, Lasix on hold for now  - Creatinine Trend: 1.5<--, 1.4<--, 1.3<--, 1.5<--, 1.7<--, 1.9<--  - monitor I's and O's    #CAD s/p CABG 10 yrs ago  #HLD  - C/w Aspirin 81mg qD, Metoprolol 100mg BID, Ranolazine 1000mg BID  - C/w Atorvastatin 80mg qHS, Gemfibrozil 600mg BID    #BPH  - C/w Flomax 0.4mg qHS    #HTN  - Home Lisinopril, Lasix on hold due to Cr elevation  - c/w metoprolol 100mg BID    DVT ppx: Heparin SubQ 7500U BID  GI ppx: pantoprazole 40mg qD  Diet: DASH/TLC  PT/Rehab: Evaluation pending  Activity: Ambulate as tolerated  Dispo: Home pending clinical improvement  FULL CODE  Contact: Narda (daughter) [phone]  
  SUBJECTIVE  Patient is a 71y old Male who presents with a chief complaint of COVID-19 (21 Apr 2021 15:20)  Currently admitted to medicine with the primary diagnosis of COVID-19    Today is hospital day 8d, and this morning he is alert, not in distress on HFNC 50/60 and reports no overnight events.         OBJECTIVE  PAST MEDICAL & SURGICAL HISTORY  CAD (coronary artery disease)  s/p CABG    High cholesterol    Chronic kidney disease, unspecified CKD stage    BPH (benign prostatic hyperplasia)    HTN (hypertension)    S/P CABG (coronary artery bypass graft)    Arm fracture, left  s/p repair      ALLERGIES:  No Known Allergies    MEDICATIONS:  STANDING MEDICATIONS  allopurinol 300 milliGRAM(s) Oral daily  amLODIPine   Tablet 5 milliGRAM(s) Oral daily  aspirin enteric coated 81 milliGRAM(s) Oral daily  atorvastatin 80 milliGRAM(s) Oral at bedtime  chlorhexidine 4% Liquid 1 Application(s) Topical <User Schedule>  dexAMETHasone  Injectable 6 milliGRAM(s) IV Push daily  dicyclomine 10 milliGRAM(s) Oral daily  gemfibrozil 600 milliGRAM(s) Oral two times a day  heparin   Injectable 7500 Unit(s) SubCutaneous every 12 hours  melatonin 5 milliGRAM(s) Oral at bedtime  metoprolol tartrate 100 milliGRAM(s) Oral two times a day  pantoprazole    Tablet 40 milliGRAM(s) Oral before breakfast  ranolazine 1000 milliGRAM(s) Oral two times a day  tamsulosin 0.4 milliGRAM(s) Oral at bedtime    PRN MEDICATIONS  acetaminophen   Tablet .. 650 milliGRAM(s) Oral every 6 hours PRN      VITAL SIGNS: Last 24 Hours  T(C): 35.8 (22 Apr 2021 05:35), Max: 36.2 (21 Apr 2021 23:50)  T(F): 96.5 (22 Apr 2021 05:35), Max: 97.1 (21 Apr 2021 23:50)  HR: 103 (22 Apr 2021 05:35) (66 - 103)  BP: 133/86 (22 Apr 2021 05:35) (132/73 - 145/76)  BP(mean): --  RR: 18 (22 Apr 2021 05:35) (18 - 20)  SpO2: 96% (22 Apr 2021 05:35) (93% - 96%)    LABS:                        13.3   9.83  )-----------( 343      ( 21 Apr 2021 07:39 )             39.0     04-21    135  |  103  |  41<H>  ----------------------------<  98  5.0   |  19  |  1.2    Ca    8.2<L>      21 Apr 2021 07:39  Mg     2.6     04-21    TPro  6.0  /  Alb  3.0<L>  /  TBili  0.9  /  DBili  x   /  AST  50<H>  /  ALT  29  /  AlkPhos  141<H>  04-21                  RADIOLOGY:      PHYSICAL EXAM:    GENERAL: NAD, well-developed, AAOx3, on HFNC  HEENT:  Atraumatic, Normocephalic. EOMI, PERRLA, conjunctiva and sclera clear, No JVD  PULMONARY: Clear to auscultation bilaterally; No wheeze  CARDIOVASCULAR: Regular rate and rhythm; No murmurs, rubs, or gallops  GASTROINTESTINAL: Soft, Nontender, Nondistended; Bowel sounds present  MUSCULOSKELETAL:  2+ Peripheral Pulses, No clubbing, cyanosis, or edema  NEUROLOGY: non-focal  SKIN: No rashes or lesions      ADMISSION SUMMARY  72 yo M with PMHx of CAD s/p CABG 10 yrs ago, HTN, BPH, HLD, CKD presents with worsening dyspnea, malaise, admitted for acute hypoxemic respiratory failure due to COVID.    #Acute hypoxic respiratory failure secondary to COVID-19 pneumonia  COVID-19 PCR positive ~3 weeks ago. Symptoms mild until 4-5 days worsening SOB prior to admission.   -Chest X-ray 4/17: slightly increased bilateral opacities  -Patient desaturated upon ambulation 4/17 placed on 15LPM NRB >  now on HFNC 50LPM, 60% FiO2   -inflammatory markers:   --d-dimer 320 4/14 > 345 4/17 > 768  -- 4/14 > 131 4/17 >126  --ferritin 1593 4/14 > 2329 > 2016  --procalc 0.36 4/14 > 0.23 4/17 > 0.1  - ID following - out of window for RDV, c/w Dexamethasone 6mg qD for a total of 10 days, s/p toci 600mg 4/18  - COVID AB = positive, no role for plasma  - Will attempt to wean off oxygen.     #CKD with possible RUTH, unclear baseline Cr - improving  - Cr 1.9 on admission  - Pt sees Nephrologist on [place], unsure of name  - Renal US revealed L renal cyst, no other abnormalities   - Home Lisinopril, Lasix on hold for now  - Creatinine Trend: 1.5<--, 1.4<--, 1.3<--, 1.5<--, 1.7<--, 1.9<--  - monitor I's and O's    #CAD s/p CABG 10 yrs ago  #HLD  - C/w Aspirin 81mg qD, Metoprolol 100mg BID, Ranolazine 1000mg BID  - C/w Atorvastatin 80mg qHS, Gemfibrozil 600mg BID    #BPH  - C/w Flomax 0.4mg qHS    #HTN  - Home Lisinopril, Lasix on hold due to Cr elevation  - c/w metoprolol 100mg BID    DVT ppx: Heparin SubQ 7500U BID  GI ppx: pantoprazole 40mg qD  Diet: DASH/TLC  PT/Rehab: Evaluation pending  Activity: Ambulate as tolerated  Dispo: Home pending clinical improvement  FULL CODE  Contact: Narda (daughter) [phone]    
JORDIN GARCIA  71y Male    CHIEF COMPLAINT:    Patient is a 71y old  Male who presents with a chief complaint of COVID-19 (26 Apr 2021 13:09)      INTERVAL HPI/OVERNIGHT EVENTS:    Patient seen and examined. No acute events overnight. Feels well, denies any complaints    ROS: All other systems are negative.    Vital Signs:    T(F): 96.7 (04-26-21 @ 13:27), Max: 97.4 (04-25-21 @ 20:58)  HR: 74 (04-26-21 @ 13:27) (69 - 88)  BP: 121/56 (04-26-21 @ 13:27) (118/68 - 125/77)  RR: 18 (04-26-21 @ 13:27) (17 - 18)  SpO2: 94% (04-26-21 @ 13:27) (93% - 96%)    PHYSICAL EXAM:    GENERAL:  NAD  SKIN: No rashes or lesions  HEENT: Atraumatic. Normocephalic.    NECK: Supple, No JVD.   PULMONARY: CTA B/L. No wheezing. No rales  CVS: Normal S1, S2. Rate and Rhythm are regular.    ABDOMEN/GI: Soft, Nontender, Nondistended; BS present  MSK: No clubbing or cyanosis  NEUROLOGIC: moves all extremities  PSYCH: Alert & oriented x 3, normal affect    Consultant(s) Notes Reviewed:  [x ] YES  [ ] NO  Care Discussed with Consultants/Other Providers [ x] YES  [ ] NO    LABS:                        11.5   8.55  )-----------( 280      ( 26 Apr 2021 07:17 )             35.3     138  |  106  |  26<H>  ----------------------------<  90  5.0   |  23  |  1.1    Ca    8.3<L>      26 Apr 2021 07:17  Mg     2.5     04-26    TPro  5.2<L>  /  Alb  2.9<L>  /  TBili  0.6  /  DBili  x   /  AST  148<H>  /  ALT  63<H>  /  AlkPhos  90  04-26    RADIOLOGY & ADDITIONAL TESTS:  Imaging or report Personally Reviewed:  [x] YES  [ ] NO  EKG reviewed: [x] YES  [ ] NO    Medications:  Standing  allopurinol 300 milliGRAM(s) Oral daily  amLODIPine   Tablet 5 milliGRAM(s) Oral daily  aspirin enteric coated 81 milliGRAM(s) Oral daily  atorvastatin 80 milliGRAM(s) Oral at bedtime  chlorhexidine 4% Liquid 1 Application(s) Topical <User Schedule>  dicyclomine 10 milliGRAM(s) Oral daily  furosemide    Tablet 20 milliGRAM(s) Oral daily  gemfibrozil 600 milliGRAM(s) Oral two times a day  heparin   Injectable 7500 Unit(s) SubCutaneous every 12 hours  insulin regular  human recombinant 10 Unit(s) IV Push once  melatonin 5 milliGRAM(s) Oral at bedtime  metoprolol tartrate 100 milliGRAM(s) Oral two times a day  pantoprazole    Tablet 40 milliGRAM(s) Oral before breakfast  ranolazine 1000 milliGRAM(s) Oral two times a day  tamsulosin 0.4 milliGRAM(s) Oral at bedtime    PRN Meds  acetaminophen   Tablet .. 650 milliGRAM(s) Oral every 6 hours PRN            
NEPHROLOGY FOLLOW UP NOTE    bp's fair  renal fx stable  sats ok on 2l nc      PAST MEDICAL & SURGICAL HISTORY:  CAD (coronary artery disease)  s/p CABG    High cholesterol    Chronic kidney disease, unspecified CKD stage    BPH (benign prostatic hyperplasia)    HTN (hypertension)    S/P CABG (coronary artery bypass graft)    Arm fracture, left  s/p repair      Allergies:  No Known Allergies    Home Medications Reviewed    SOCIAL HISTORY:  Denies ETOH,Smoking,   FAMILY HISTORY:  FH: prostate cancer (Father)    FH: uterine cancer (Mother)          REVIEW OF SYSTEMS:  CONSTITUTIONAL: No weakness, fevers or chills  EYES/ENT: No visual changes;  No vertigo or throat pain   NECK: No pain or stiffness  RESPIRATORY: No cough, wheezing, hemoptysis; No shortness of breath  CARDIOVASCULAR: No chest pain or palpitations.  GASTROINTESTINAL: No abdominal or epigastric pain. No nausea, vomiting, or hematemesis; No diarrhea or constipation. No melena or hematochezia.  GENITOURINARY: No dysuria, frequency, foamy urine, urinary urgency, incontinence or hematuria  NEUROLOGICAL: No numbness or weakness  SKIN: No itching, burning, rashes, or lesions   VASCULAR: No bilateral lower extremity edema.   All other review of systems is negative unless indicated above.    PHYSICAL EXAM:  Constitutional: NAD  HEENT: anicteric sclera, oropharynx clear, MMM, O2 NC  Neck: No JVD  Respiratory: CTAB, no wheezes, rales or rhonchi  Cardiovascular: S1, S2, RRR  Gastrointestinal: BS+, soft, NT/ND  Extremities: No cyanosis or clubbing. No peripheral edema  Neurological: A/O x 3, no focal deficits  Psychiatric: Normal mood, normal affect  : No CVA tenderness. No edwards.   Skin: No rashes    Hospital Medications:   MEDICATIONS  (STANDING):  allopurinol 300 milliGRAM(s) Oral daily  amLODIPine   Tablet 5 milliGRAM(s) Oral daily  aspirin enteric coated 81 milliGRAM(s) Oral daily  chlorhexidine 4% Liquid 1 Application(s) Topical <User Schedule>  dicyclomine 10 milliGRAM(s) Oral daily  enoxaparin Injectable 40 milliGRAM(s) SubCutaneous at bedtime  furosemide    Tablet 20 milliGRAM(s) Oral daily  gemfibrozil 600 milliGRAM(s) Oral two times a day  insulin regular  human recombinant 10 Unit(s) IV Push once  melatonin 5 milliGRAM(s) Oral at bedtime  metoprolol tartrate 100 milliGRAM(s) Oral two times a day  pantoprazole    Tablet 40 milliGRAM(s) Oral before breakfast  ranolazine 1000 milliGRAM(s) Oral two times a day  tamsulosin 0.4 milliGRAM(s) Oral at bedtime        VITALS:  T(F): 97 (21 @ 13:49), Max: 97 (21 @ 05:00)  HR: 79 (21 @ 13:49)  BP: 113/64 (21 @ 13:49)  RR: 18 (21 @ 13:49)  SpO2: 97% (21 @ 13:49)  Wt(kg): --        LABS:      138  |  103  |  30<H>  ----------------------------<  87  4.9   |  25  |  1.1    Ca    9.0      2021 08:06  Mg     2.1         TPro  5.7<L>  /  Alb  3.4<L>  /  TBili  0.7  /  DBili      /  AST  343<H>  /  ALT  128<H>  /  AlkPhos  68                            11.9   8.60  )-----------( 270      ( 2021 08:06 )             36.1       Urine Studies:  Urinalysis Basic - ( 2021 18:55 )    Color: Yellow / Appearance: Clear / S.027 / pH:   Gluc:  / Ketone: Negative  / Bili: Negative / Urobili: <2 mg/dL   Blood:  / Protein: 30 mg/dL / Nitrite: Negative   Leuk Esterase: Negative / RBC: 5 /HPF / WBC 3 /HPF   Sq Epi:  / Non Sq Epi: 1 /HPF / Bacteria: Negative      Creatinine, Random Urine: 101 mg/dL ( @ 18:55)  Protein/Creatinine Ratio Calculation: 0.5 Ratio ( @ 18:55)      RADIOLOGY & ADDITIONAL STUDIES:  
NEPHROLOGY FOLLOW UP NOTE    same O2 requirements  BP's acceptable  vasc input noted  no new complaints    PAST MEDICAL & SURGICAL HISTORY:  CAD (coronary artery disease)  s/p CABG    High cholesterol    Chronic kidney disease, unspecified CKD stage    BPH (benign prostatic hyperplasia)    HTN (hypertension)    S/P CABG (coronary artery bypass graft)    Arm fracture, left  s/p repair      Allergies:  No Known Allergies    Home Medications Reviewed    SOCIAL HISTORY:  Denies ETOH,Smoking,   FAMILY HISTORY:  FH: prostate cancer (Father)    FH: uterine cancer (Mother)          REVIEW OF SYSTEMS:  CONSTITUTIONAL: No weakness, fevers or chills  EYES/ENT: No visual changes;  No vertigo or throat pain   NECK: No pain or stiffness  RESPIRATORY: No cough, wheezing, hemoptysis; No shortness of breath  CARDIOVASCULAR: No chest pain or palpitations.  GASTROINTESTINAL: No abdominal or epigastric pain. No nausea, vomiting, or hematemesis; No diarrhea or constipation. No melena or hematochezia.  GENITOURINARY: No dysuria, frequency, foamy urine, urinary urgency, incontinence or hematuria  NEUROLOGICAL: No numbness or weakness  SKIN: No itching, burning, rashes, or lesions   VASCULAR: No bilateral lower extremity edema.   All other review of systems is negative unless indicated above.    PHYSICAL EXAM:  Constitutional: NAD  HEENT: anicteric sclera, oropharynx clear, MMM, O2 NC  Neck: No JVD  Respiratory: CTAB, no wheezes, rales or rhonchi  Cardiovascular: S1, S2, RRR  Gastrointestinal: BS+, soft, NT/ND  Extremities: No cyanosis or clubbing. No peripheral edema  Neurological: A/O x 3, no focal deficits  Psychiatric: Normal mood, normal affect  : No CVA tenderness. No edwards.   Skin: No rashes    Hospital Medications:   MEDICATIONS  (STANDING):  allopurinol 300 milliGRAM(s) Oral daily  amLODIPine   Tablet 5 milliGRAM(s) Oral daily  aspirin enteric coated 81 milliGRAM(s) Oral daily  atorvastatin 80 milliGRAM(s) Oral at bedtime  chlorhexidine 4% Liquid 1 Application(s) Topical <User Schedule>  dicyclomine 10 milliGRAM(s) Oral daily  enoxaparin Injectable 40 milliGRAM(s) SubCutaneous at bedtime  furosemide    Tablet 20 milliGRAM(s) Oral daily  gemfibrozil 600 milliGRAM(s) Oral two times a day  insulin regular  human recombinant 10 Unit(s) IV Push once  melatonin 5 milliGRAM(s) Oral at bedtime  metoprolol tartrate 100 milliGRAM(s) Oral two times a day  pantoprazole    Tablet 40 milliGRAM(s) Oral before breakfast  ranolazine 1000 milliGRAM(s) Oral two times a day  tamsulosin 0.4 milliGRAM(s) Oral at bedtime        VITALS:  T(F): 97.2 (21 @ 12:40), Max: 97.7 (21 @ 20:36)  HR: 78 (21 @ 12:40)  BP: 130/69 (21 @ 12:40)  RR: 17 (21 @ 12:40)  SpO2: 93% (21 @ 12:40)  Wt(kg): --        LABS:      136  |  104  |  26<H>  ----------------------------<  76  5.1<H>   |  22  |  1.1    Ca    8.7      2021 08:21  Mg     2.3         TPro  5.7<L>  /  Alb  3.2<L>  /  TBili  0.8  /  DBili      /  AST  242<H>  /  ALT  89<H>  /  AlkPhos  85                            11.7   9.82  )-----------( 281      ( 2021 08:21 )             35.7       Urine Studies:  Urinalysis Basic - ( 2021 18:55 )    Color: Yellow / Appearance: Clear / S.027 / pH:   Gluc:  / Ketone: Negative  / Bili: Negative / Urobili: <2 mg/dL   Blood:  / Protein: 30 mg/dL / Nitrite: Negative   Leuk Esterase: Negative / RBC: 5 /HPF / WBC 3 /HPF   Sq Epi:  / Non Sq Epi: 1 /HPF / Bacteria: Negative      Creatinine, Random Urine: 101 mg/dL ( @ 18:55)  Protein/Creatinine Ratio Calculation: 0.5 Ratio ( @ 18:55)      RADIOLOGY & ADDITIONAL STUDIES:    
NEPHROLOGY FOLLOW UP NOTE    still on O2 NC  BP's acceptable  K+ elevated  no new complaints    PAST MEDICAL & SURGICAL HISTORY:  CAD (coronary artery disease)  s/p CABG    High cholesterol    Chronic kidney disease, unspecified CKD stage    BPH (benign prostatic hyperplasia)    HTN (hypertension)    S/P CABG (coronary artery bypass graft)    Arm fracture, left  s/p repair      Allergies:  No Known Allergies    Home Medications Reviewed    SOCIAL HISTORY:  Denies ETOH,Smoking,   FAMILY HISTORY:  FH: prostate cancer (Father)    FH: uterine cancer (Mother)          REVIEW OF SYSTEMS:  CONSTITUTIONAL: No weakness, fevers or chills  EYES/ENT: No visual changes;  No vertigo or throat pain   NECK: No pain or stiffness  RESPIRATORY: No cough, wheezing, hemoptysis; No shortness of breath  CARDIOVASCULAR: No chest pain or palpitations.  GASTROINTESTINAL: No abdominal or epigastric pain. No nausea, vomiting, or hematemesis; No diarrhea or constipation. No melena or hematochezia.  GENITOURINARY: No dysuria, frequency, foamy urine, urinary urgency, incontinence or hematuria  NEUROLOGICAL: No numbness or weakness  SKIN: No itching, burning, rashes, or lesions   VASCULAR: No bilateral lower extremity edema.   All other review of systems is negative unless indicated above.    PHYSICAL EXAM:  Constitutional: NAD  HEENT: anicteric sclera, oropharynx clear, MMM, O2 NC  Neck: No JVD  Respiratory: CTAB, no wheezes, rales or rhonchi  Cardiovascular: S1, S2, RRR  Gastrointestinal: BS+, soft, NT/ND  Extremities: No cyanosis or clubbing. No peripheral edema  Neurological: A/O x 3, no focal deficits  Psychiatric: Normal mood, normal affect  : No CVA tenderness. No edwards.   Skin: No rashes    Hospital Medications:   MEDICATIONS  (STANDING):  allopurinol 300 milliGRAM(s) Oral daily  amLODIPine   Tablet 5 milliGRAM(s) Oral daily  aspirin enteric coated 81 milliGRAM(s) Oral daily  chlorhexidine 4% Liquid 1 Application(s) Topical <User Schedule>  dicyclomine 10 milliGRAM(s) Oral daily  enoxaparin Injectable 40 milliGRAM(s) SubCutaneous at bedtime  furosemide    Tablet 20 milliGRAM(s) Oral daily  gemfibrozil 600 milliGRAM(s) Oral two times a day  insulin regular  human recombinant 10 Unit(s) IV Push once  melatonin 5 milliGRAM(s) Oral at bedtime  metoprolol tartrate 100 milliGRAM(s) Oral two times a day  pantoprazole    Tablet 40 milliGRAM(s) Oral before breakfast  ranolazine 1000 milliGRAM(s) Oral two times a day  sodium polystyrene sulfonate Suspension 30 Gram(s) Oral once  tamsulosin 0.4 milliGRAM(s) Oral at bedtime        VITALS:  T(F): 95.8 (21 @ 13:15), Max: 97 (21 @ 05:00)  HR: 81 (21 @ 13:15)  BP: 125/70 (21 @ 13:15)  RR: 18 (21 @ 13:15)  SpO2: 95% (21 @ 13:15)  Wt(kg): --        LABS:      135  |  102  |  30<H>  ----------------------------<  85  5.3<H>   |  25  |  1.2    Ca    8.6      2021 07:02  Mg     2.0         TPro  5.8<L>  /  Alb  3.3<L>  /  TBili  0.7  /  DBili      /  AST  289<H>  /  ALT  105<H>  /  AlkPhos  79                            12.5   9.15  )-----------( 298      ( 2021 07:02 )             37.9       Urine Studies:  Urinalysis Basic - ( 2021 18:55 )    Color: Yellow / Appearance: Clear / S.027 / pH:   Gluc:  / Ketone: Negative  / Bili: Negative / Urobili: <2 mg/dL   Blood:  / Protein: 30 mg/dL / Nitrite: Negative   Leuk Esterase: Negative / RBC: 5 /HPF / WBC 3 /HPF   Sq Epi:  / Non Sq Epi: 1 /HPF / Bacteria: Negative      Creatinine, Random Urine: 101 mg/dL ( @ 18:55)  Protein/Creatinine Ratio Calculation: 0.5 Ratio ( @ 18:55)      RADIOLOGY & ADDITIONAL STUDIES:  
SUBJECTIVE:    Patient is a 71y old Male who presents with a chief complaint of COVID-19 (17 Apr 2021 15:13)    Currently admitted to medicine with the primary diagnosis of COVID-19     Today is hospital day 4d. This morning he is resting comfortably in bed and reports no new issues or overnight events. Afebrile overnight. Pt desaturated upon ambulation and currently on 15LPM NRB.       PAST MEDICAL & SURGICAL HISTORY  CAD (coronary artery disease)  s/p CABG    High cholesterol    Chronic kidney disease, unspecified CKD stage    BPH (benign prostatic hyperplasia)    HTN (hypertension)    S/P CABG (coronary artery bypass graft)    Arm fracture, left  s/p repair      SOCIAL HISTORY:  Negative for smoking/alcohol/drug use.     ALLERGIES:  No Known Allergies    MEDICATIONS:  STANDING MEDICATIONS  allopurinol 300 milliGRAM(s) Oral daily  aspirin enteric coated 81 milliGRAM(s) Oral daily  atorvastatin 80 milliGRAM(s) Oral at bedtime  chlorhexidine 4% Liquid 1 Application(s) Topical <User Schedule>  dexAMETHasone  Injectable 6 milliGRAM(s) IV Push daily  dicyclomine 10 milliGRAM(s) Oral daily  gemfibrozil 600 milliGRAM(s) Oral two times a day  heparin   Injectable 7500 Unit(s) SubCutaneous every 12 hours  metoprolol tartrate 100 milliGRAM(s) Oral two times a day  pantoprazole    Tablet 40 milliGRAM(s) Oral before breakfast  ranolazine 1000 milliGRAM(s) Oral two times a day  tamsulosin 0.4 milliGRAM(s) Oral at bedtime    PRN MEDICATIONS  acetaminophen   Tablet .. 650 milliGRAM(s) Oral every 6 hours PRN    VITALS:   T(F): 95.4  HR: 75  BP: 158/76  RR: 20  SpO2: 97%    LABS:                        13.6   13.41 )-----------( 253      ( 17 Apr 2021 06:34 )             40.4     04-17    140  |  106  |  54<H>  ----------------------------<  117<H>  4.9   |  21  |  1.5    Ca    8.6      17 Apr 2021 06:34    TPro  6.6  /  Alb  3.4<L>  /  TBili  0.4  /  DBili  x   /  AST  76<H>  /  ALT  30  /  AlkPhos  92  04-17      RADIOLOGY:  < from: Xray Chest 1 View- PORTABLE-Urgent (Xray Chest 1 View- PORTABLE-Urgent .) (04.17.21 @ 09:30) >  Impression:  Slightly increased bilateral mid to lower lung and peripheral predominant opacities. No pneumothorax.  < end of copied text >    PHYSICAL EXAM:  GEN: NAD, lying in bed comfortably  HEENT: EOMI, PERRLA, conjunctiva and sclera clear. MMM.  LUNGS: Clear to auscultation bilaterally. No rales, rhonchi, or wheezing appreciated. On 15LPM NRB.  HEART: S1/S2 present. RRR.   ABD: Soft, non-tender, non-distended. Bowel sounds present.  EXT: 2+ peripheral pulses. No clubbing, cyanosis, or edema.   NEURO: AAOX3. Speech clear. No focal neurological deficits. Sensation grossly intact.   Skin: No rashes or lesions.   
SUBJECTIVE:    Patient is a 71y old Male who presents with a chief complaint of COVID-19 (18 Apr 2021 10:59)    Currently admitted to medicine with the primary diagnosis of COVID-19     Today is hospital day 5d. Afebrile overnight. Pt desaturated upon ambulation and placed on 15LPM NRB, continued to be hypoxic and placed on HFNC. Complains of intermittent SOB this AM.       PAST MEDICAL & SURGICAL HISTORY  CAD (coronary artery disease)  s/p CABG    High cholesterol    Chronic kidney disease, unspecified CKD stage    BPH (benign prostatic hyperplasia)    HTN (hypertension)    S/P CABG (coronary artery bypass graft)    Arm fracture, left  s/p repair      SOCIAL HISTORY:  Negative for smoking/alcohol/drug use.     ALLERGIES:  No Known Allergies    MEDICATIONS:  STANDING MEDICATIONS  allopurinol 300 milliGRAM(s) Oral daily  aspirin enteric coated 81 milliGRAM(s) Oral daily  atorvastatin 80 milliGRAM(s) Oral at bedtime  chlorhexidine 4% Liquid 1 Application(s) Topical <User Schedule>  dexAMETHasone  Injectable 6 milliGRAM(s) IV Push daily  dicyclomine 10 milliGRAM(s) Oral daily  gemfibrozil 600 milliGRAM(s) Oral two times a day  heparin   Injectable 7500 Unit(s) SubCutaneous every 12 hours  metoprolol tartrate 100 milliGRAM(s) Oral two times a day  pantoprazole    Tablet 40 milliGRAM(s) Oral before breakfast  ranolazine 1000 milliGRAM(s) Oral two times a day  tamsulosin 0.4 milliGRAM(s) Oral at bedtime    PRN MEDICATIONS  acetaminophen   Tablet .. 650 milliGRAM(s) Oral every 6 hours PRN    VITALS:   T(F): 97.6  HR: 77  BP: 174/96  RR: 18  SpO2: 89%    LABS:                        13.3   10.45 )-----------( 278      ( 19 Apr 2021 08:25 )             38.9     04-19    139  |  107  |  46<H>  ----------------------------<  99  5.0   |  19  |  1.4    Ca    8.2<L>      19 Apr 2021 08:25  Mg     2.8     04-19    TPro  6.3  /  Alb  3.0<L>  /  TBili  0.6  /  DBili  x   /  AST  59<H>  /  ALT  31  /  AlkPhos  113  04-19      RADIOLOGY:  < from: Xray Chest 1 View- PORTABLE-Urgent (Xray Chest 1 View- PORTABLE-Urgent .) (04.17.21 @ 09:30) >  Impression:  Slightly increased bilateral mid to lower lung and peripheral predominant opacities. No pneumothorax.  < end of copied text >    PHYSICAL EXAM:  GEN: NAD, lying in bed comfortably  HEENT: EOMI, PERRLA, conjunctiva and sclera clear. MMM.  LUNGS: Clear to auscultation bilaterally. No rales, rhonchi, or wheezing appreciated. On HFNC  HEART: S1/S2 present. RRR.   ABD: Soft, non-tender, non-distended. Bowel sounds present.  EXT: 2+ peripheral pulses. No clubbing, cyanosis, or edema.   NEURO: AAOX3. Speech clear. No focal neurological deficits. Sensation grossly intact.   Skin: No rashes or lesions.   
Today is hospital day 12d.     INTERVAL HPI / OVERNIGHT EVENTS  Patient was examined and seen at bedside.   Refused NIV last night. Doing well this AM.      PAST MEDICAL & SURGICAL HISTORY  CAD (coronary artery disease)  s/p CABG    High cholesterol    Chronic kidney disease, unspecified CKD stage    BPH (benign prostatic hyperplasia)    HTN (hypertension)    S/P CABG (coronary artery bypass graft)    Arm fracture, left  s/p repair      ALLERGIES  No Known Allergies    MEDICATIONS  STANDING MEDICATIONS  allopurinol 300 milliGRAM(s) Oral daily  amLODIPine   Tablet 5 milliGRAM(s) Oral daily  aspirin enteric coated 81 milliGRAM(s) Oral daily  atorvastatin 80 milliGRAM(s) Oral at bedtime  chlorhexidine 4% Liquid 1 Application(s) Topical <User Schedule>  dicyclomine 10 milliGRAM(s) Oral daily  gemfibrozil 600 milliGRAM(s) Oral two times a day  heparin   Injectable 7500 Unit(s) SubCutaneous every 12 hours  insulin regular  human recombinant 10 Unit(s) IV Push once  melatonin 5 milliGRAM(s) Oral at bedtime  metoprolol tartrate 100 milliGRAM(s) Oral two times a day  pantoprazole    Tablet 40 milliGRAM(s) Oral before breakfast  ranolazine 1000 milliGRAM(s) Oral two times a day  tamsulosin 0.4 milliGRAM(s) Oral at bedtime    PRN MEDICATIONS  acetaminophen   Tablet .. 650 milliGRAM(s) Oral every 6 hours PRN    VITALS:  T(F): 96  HR: 85  BP: 124/72  RR: 18  SpO2: 93%    PHYSICAL EXAM  GEN: NAD, Resting comfortably in bed  PULM: Clear to auscultation bilaterally, No wheezes/rales/rhonchi  CVS: Regular rate and rhythm, S1-S2, no murmurs/rubs/gallops, +2 pulses  ABD: Soft, non-tender, non-distended, no guarding  EXT: No edema  NEURO: A&Ox3, no focal deficits    LABS                        11.5   8.55  )-----------( 280      ( 26 Apr 2021 07:17 )             35.3     04-26    138  |  106  |  26<H>  ----------------------------<  90  5.0   |  23  |  1.1    Ca    8.3<L>      26 Apr 2021 07:17  Mg     2.5     04-26    TPro  5.2<L>  /  Alb  2.9<L>  /  TBili  0.6  /  DBili  x   /  AST  148<H>  /  ALT  63<H>  /  AlkPhos  90  04-26      RADIOLOGY  < from: CT Angio Chest w/ IV Cont (04.25.21 @ 14:42) >  1. Thoracic aortic aneurysm measuring 5.7 x 5.3 cm at T6-7 and 5.8 x 5.0 cm atthe diaphragmatic hiatus. 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    2. Right renal arteries both arise from false lumen with delayed right nephrogram.    3. Diffuse bilateral pulmonaryconsolidations, increased from prior examination.    < end of copied text >  
Today is hospital day 14d.     INTERVAL HPI / OVERNIGHT EVENTS  Patient was examined and seen at bedside.   No OVNE.       PAST MEDICAL & SURGICAL HISTORY  CAD (coronary artery disease)  s/p CABG    High cholesterol    Chronic kidney disease, unspecified CKD stage    BPH (benign prostatic hyperplasia)    HTN (hypertension)    S/P CABG (coronary artery bypass graft)    Arm fracture, left  s/p repair      ALLERGIES  No Known Allergies    MEDICATIONS  STANDING MEDICATIONS  allopurinol 300 milliGRAM(s) Oral daily  amLODIPine   Tablet 5 milliGRAM(s) Oral daily  aspirin enteric coated 81 milliGRAM(s) Oral daily  atorvastatin 80 milliGRAM(s) Oral at bedtime  chlorhexidine 4% Liquid 1 Application(s) Topical <User Schedule>  dicyclomine 10 milliGRAM(s) Oral daily  enoxaparin Injectable 40 milliGRAM(s) SubCutaneous at bedtime  furosemide    Tablet 20 milliGRAM(s) Oral daily  gemfibrozil 600 milliGRAM(s) Oral two times a day  insulin regular  human recombinant 10 Unit(s) IV Push once  melatonin 5 milliGRAM(s) Oral at bedtime  metoprolol tartrate 100 milliGRAM(s) Oral two times a day  pantoprazole    Tablet 40 milliGRAM(s) Oral before breakfast  ranolazine 1000 milliGRAM(s) Oral two times a day  tamsulosin 0.4 milliGRAM(s) Oral at bedtime    PRN MEDICATIONS  acetaminophen   Tablet .. 650 milliGRAM(s) Oral every 6 hours PRN    VITALS:  T(F): 97  HR: 79  BP: 148/79  RR: 18  SpO2: 94%    PHYSICAL EXAM  GEN: NAD, Resting comfortably in bed  PULM: Rales/Rhonchi at b/l bases (R>L)  CVS: Regular rate and rhythm, S1-S2, no murmurs/rubs/gallops, +2 pulses  ABD: Soft, non-tender, non-distended, no guarding  EXT: No edema  NEURO: A&Ox3, no focal deficits    LABS                        12.5   9.15  )-----------( 298      ( 2021 07:02 )             37.9         135  |  102  |  30<H>  ----------------------------<  85  5.3<H>   |  25  |  1.2    Ca    8.6      2021 07:02  Mg     2.0         TPro  5.8<L>  /  Alb  3.3<L>  /  TBili  0.7  /  DBili  x   /  AST  289<H>  /  ALT  105<H>  /  AlkPhos  79        Urinalysis Basic - ( 2021 18:55 )    Color: Yellow / Appearance: Clear / S.027 / pH: x  Gluc: x / Ketone: Negative  / Bili: Negative / Urobili: <2 mg/dL   Blood: x / Protein: 30 mg/dL / Nitrite: Negative   Leuk Esterase: Negative / RBC: 5 /HPF / WBC 3 /HPF   Sq Epi: x / Non Sq Epi: 1 /HPF / Bacteria: Negative    RADIOLOGY  < from: Xray Chest 1 View- PORTABLE-Routine (Xray Chest 1 View- PORTABLE-Routine in AM.) (21 @ 06:54) >  Patchy opacifications, consistent with viral pneumonia.  < end of copied text >  
  JORDIN GARCIA  [facility] T2-3A 020 B ([facility] T2-3A)      Patient was evaluated and examined  by bedside, no dyspnea at rest, no fever, no cough      REVIEW OF SYSTEMS:  please see pertinent positives mentioned above, all other 12 ROS negative      T(C): , Max: 35.8 (04-16-21 @ 20:11)  HR: 81 (04-17-21 @ 05:16)  BP: 133/66 (04-17-21 @ 06:26)  RR: 18 (04-17-21 @ 05:16)  SpO2: 94% (04-17-21 @ 13:34)  CAPILLARY BLOOD GLUCOSE          PHYSICAL EXAM:  General: NAD, AAOX3, patient is laying comfortably in bed  HEENT: AT, NC, Supple, NO JVD, NO CB  Lungs: mild decreased breath sounds B/L, no wheezing, no rhonchi  CVS: normal S1, S2, RRR, NO M/G/R  Abdomen: soft, bowel sounds present, non-tender, non-distended  Extremities: no edema, no clubbing, no cyanosis, positive peripheral pulses b/l  Neuro: no acute focal neurological deficits  Skin: no rash, no ecchymosis      LAB  CBC  Date: 04-17-21 @ 06:34  Mean cell Hrfqcvtgwv65.3  Mean cell Hemoglobin Conc33.7  Mean cell Volum 93.1  Platelet count-Automate 253  RBC Count 4.34  Red Cell Distrib Width14.2  WBC Count13.41  % Albumin, Urine--  Hematocrit 40.4  Hemoglobin 13.6  CBC  Date: 04-16-21 @ 06:54  Mean cell Gejabxipgq22.0  Mean cell Hemoglobin Conc33.2  Mean cell Volum 93.3  Platelet count-Automate 204  RBC Count 3.71  Red Cell Distrib Width14.1  WBC Count11.37  % Albumin, Urine--  Hematocrit 34.6  Hemoglobin 11.5  CBC  Date: 04-15-21 @ 08:54  Mean cell Vlubugqagc98.1  Mean cell Hemoglobin Conc33.1  Mean cell Volum 94.0  Platelet count-Automate 211  RBC Count 4.18  Red Cell Distrib Width14.3  WBC Count7.45  % Albumin, Urine--  Hematocrit 39.3  Hemoglobin 13.0  CBC  Date: 04-14-21 @ 21:00  Mean cell Tzgoxkkdqx61.0  Mean cell Hemoglobin Conc33.1  Mean cell Volum 93.8  Platelet count-Automate 188  RBC Count 4.22  Red Cell Distrib Width14.2  WBC Count8.74  % Albumin, Urine--  Hematocrit 39.6  Hemoglobin 13.1    [facility]  04-17-21 @ 06:34  Blood Gas Arterial-Calcium,Ionized--  Blood Urea Nitrogen, Serum 54 mg/dL<H> [10 - 20]  Carbon Dioxide, Serum21 mmol/L [17 - 32]  Chloride, Kdoqo622 mmol/L [98 - 110]  Creatinie, Serum1.5 mg/dL [0.7 - 1.5]  Glucose, Oqukt765 mg/dL<H> [70 - 99]  Potassium, Serum4.9 mmol/L [3.5 - 5.0]  Sodium, Serum 140 mmol/L [135 - 146]  [facility]  04-16-21 @ 06:54  Blood Gas Arterial-Calcium,Ionized--  Blood Urea Nitrogen, Serum 66 mg/dL<HH> [10 - 20] [Critical value:]  Carbon Dioxide, Serum19 mmol/L [17 - 32]  Chloride, Syoha943 mmol/L [98 - 110]  Creatinie, Serum1.7 mg/dL<H> [0.7 - 1.5]  Glucose, Nqwsw001 mg/dL<H> [70 - 99]  Potassium, Serum4.1 mmol/L [3.5 - 5.0]  Sodium, Serum 136 mmol/L [135 - 146]  [facility]  04-15-21 @ 08:54  Blood Gas Arterial-Calcium,Ionized--  Blood Urea Nitrogen, Serum 60 mg/dL<H> [10 - 20]  Carbon Dioxide, Serum19 mmol/L [17 - 32]  Chloride, Hfotd327 mmol/L [98 - 110]  Creatinie, Serum1.9 mg/dL<H> [0.7 - 1.5]  Glucose, Mgucd601 mg/dL<H> [70 - 99]  Potassium, Serum4.7 mmol/L [3.5 - 5.0]  Sodium, Serum 138 mmol/L [135 - 146]  [facility]  04-14-21 @ 21:00  Blood Gas Arterial-Calcium,Ionized--  Blood Urea Nitrogen, Serum 60 mg/dL<H> [10 - 20]  Carbon Dioxide, Serum18 mmol/L [17 - 32]  Chloride, Dfabh009 mmol/L [98 - 110]  Creatinie, Serum1.9 mg/dL<H> [0.7 - 1.5]  Glucose, Yjaib880 mg/dL<H> [70 - 99]  Potassium, Serum4.3 mmol/L [3.5 - 5.0]  Sodium, Serum 137 mmol/L [135 - 146]        Medications:  allopurinol 300 milliGRAM(s) Oral daily  aspirin enteric coated 81 milliGRAM(s) Oral daily  atorvastatin 80 milliGRAM(s) Oral at bedtime  chlorhexidine 4% Liquid 1 Application(s) Topical <User Schedule>  dexAMETHasone  Injectable 6 milliGRAM(s) IV Push daily  dicyclomine 10 milliGRAM(s) Oral daily  gemfibrozil 600 milliGRAM(s) Oral two times a day  heparin   Injectable 7500 Unit(s) SubCutaneous every 12 hours  metoprolol tartrate 100 milliGRAM(s) Oral two times a day  pantoprazole    Tablet 40 milliGRAM(s) Oral before breakfast  ranolazine 1000 milliGRAM(s) Oral two times a day  tamsulosin 0.4 milliGRAM(s) Oral at bedtime        Assessment and Plan:  Pt is a 71 year old male with PMHx of CAD s/p CABG ~10 years ago, HTN, BPH, HLD, CKD ,  presents with SOB, cough, general, malaise, and decreased PO intake for past 4-5 days.     # Acute hypoxic resp. failure due to COVID-19, severe disease  - satting 96% on 3 L NC, most likely pt. is in late inflammatory state,  out of window for remdesivir  -continue  6mg dexa for 10 days  - monitor  inflammatory markers, maintain isolation  -  COVID ab positive  - d-dimer 320  - f/u ID     # CKD stage 3,   - Cr 1.5  -hold ace , monitor daily BMP, renal diet      # CAD s/p CABG ~10 years ago  - continue asa 81, metoprolol, Ranolazine    # HTN- controlled  - continue home metoprolol  - holding lasix and lisinopril     # BPH  - continue flomax    # HLD  - continue statin, gemfibrozil  - ezetimibe non formulary    # DVT proph.- Heparin subc. inj.      #Progress Note Handoff: monitor pulse ox, inflammatory markers.   Family discussion: yes, patient's son was updated of patient's medical condition and treatment plan. Disposition: Home___once medically stable.  
  JORDIN GARCIA  [facility] T2-3A 016 B ([facility] T2-3A)            Patient was evaluated and examined  by bedside, no active complains          REVIEW OF SYSTEMS:  please see pertinent positives mentioned above, all other 12 ROS negative      T(C): , Max: 36.1 (04-29-21 @ 05:00)  HR: 79 (04-29-21 @ 05:00)  BP: 128/73 (04-29-21 @ 05:00)  RR: 18 (04-29-21 @ 05:00)  SpO2: 96% (04-29-21 @ 08:17)  CAPILLARY BLOOD GLUCOSE          PHYSICAL EXAM:  General: NAD, AAOX3, patient is laying comfortably in bed  HEENT: AT, NC, Supple, NO JVD, NO CB  Lungs: good breath sounds  B/L, no wheezing, no rhonchi  CVS: normal S1, S2, RRR, NO M/G/R  Abdomen: soft, bowel sounds present, non-tender, non-distended  Extremities: no edema, no clubbing, no cyanosis, positive peripheral pulses b/l  Neuro: no acute focal neurological deficits  Skin: no rash, no ecchymosis      LAB  CBC  Date: 04-28-21 @ 07:02  Mean cell Khxsbkgwtz89.7  Mean cell Hemoglobin Conc33.0  Mean cell Volum 96.2  Platelet count-Automate 298  RBC Count 3.94  Red Cell Distrib Width15.0  WBC Count9.15  % Albumin, Urine--  Hematocrit 37.9  Hemoglobin 12.5  CBC  Date: 04-27-21 @ 08:21  Mean cell Sgthpkefgo80.4  Mean cell Hemoglobin Conc32.8  Mean cell Volum 95.7  Platelet count-Automate 281  RBC Count 3.73  Red Cell Distrib Width14.6  WBC Count9.82  % Albumin, Urine--  Hematocrit 35.7  Hemoglobin 11.7  CBC  Date: 04-26-21 @ 07:17  Mean cell Isveajifjp96.9  Mean cell Hemoglobin Conc32.6  Mean cell Volum 94.9  Platelet count-Automate 280  RBC Count 3.72  Red Cell Distrib Width14.5  WBC Count8.55  % Albumin, Urine--  Hematocrit 35.3  Hemoglobin 11.5  CBC  Date: 04-25-21 @ 07:41  Mean cell Xctjjqiqfl77.4  Mean cell Hemoglobin Conc33.1  Mean cell Volum 94.6  Platelet count-Automate 293  RBC Count 3.70  Red Cell Distrib Width14.3  WBC Count9.67  % Albumin, Urine--  Hematocrit 35.0  Hemoglobin 11.6  CBC  Date: 04-24-21 @ 05:49  Mean cell Zsmktwpaew66.8  Mean cell Hemoglobin Conc33.8  Mean cell Volum 94.1  Platelet count-Automate 356  RBC Count 4.09  Red Cell Distrib Width14.2  WBC Count13.58  % Albumin, Urine--  Hematocrit 38.5  Hemoglobin 13.0  CBC  Date: 04-23-21 @ 04:30  Mean cell Vrzasvjyri11.1  Mean cell Hemoglobin Conc33.3  Mean cell Volum 93.4  Platelet count-Automate 389  RBC Count 4.08  Red Cell Distrib Width14.1  WBC Count13.96  % Albumin, Urine--  Hematocrit 38.1  Hemoglobin 12.7    [facility]  04-28-21 @ 07:02  Blood Gas Arterial-Calcium,Ionized--  Blood Urea Nitrogen, Serum 30 mg/dL<H> [10 - 20]  Carbon Dioxide, Serum25 mmol/L [17 - 32]  Chloride, Kezpn124 mmol/L [98 - 110]  Creatinie, Serum1.2 mg/dL [0.7 - 1.5]  Glucose, Serum85 mg/dL [70 - 99]  Potassium, Serum5.3 mmol/L<H> [3.5 - 5.0]  Sodium, Serum 135 mmol/L [135 - 146]  [facility]  04-27-21 @ 08:21  Blood Gas Arterial-Calcium,Ionized--  Blood Urea Nitrogen, Serum 26 mg/dL<H> [10 - 20]  Carbon Dioxide, Serum22 mmol/L [17 - 32]  Chloride, Pwsby385 mmol/L [98 - 110]  Creatinie, Serum1.1 mg/dL [0.7 - 1.5]  Glucose, Serum76 mg/dL [70 - 99]  Potassium, Serum5.1 mmol/L<H> [3.5 - 5.0]  Sodium, Serum 136 mmol/L [135 - 146]  [facility]  04-26-21 @ 07:17  Blood Gas Arterial-Calcium,Ionized--  Blood Urea Nitrogen, Serum 26 mg/dL<H> [10 - 20]  Carbon Dioxide, Serum23 mmol/L [17 - 32]  Chloride, Shrru034 mmol/L [98 - 110]  Creatinie, Serum1.1 mg/dL [0.7 - 1.5]  Glucose, Serum90 mg/dL [70 - 99]  Potassium, Serum5.0 mmol/L [3.5 - 5.0]  Sodium, Serum 138 mmol/L [135 - 146]  BMP  04-25-21 @ 07:41  Blood Gas Arterial-Calcium,Ionized--  Blood Urea Nitrogen, Serum 37 mg/dL<H> [10 - 20]  Carbon Dioxide, Serum20 mmol/L [17 - 32]  Chloride, Yfntd146 mmol/L [98 - 110]  Creatinie, Serum1.1 mg/dL [0.7 - 1.5]  Glucose, Serum87 mg/dL [70 - 99]  Potassium, Serum4.8 mmol/L [3.5 - 5.0]  Sodium, Serum 133 mmol/L<L> [135 - 146]        Medications:  acetaminophen   Tablet .. 650 milliGRAM(s) Oral every 6 hours PRN  allopurinol 300 milliGRAM(s) Oral daily  amLODIPine   Tablet 5 milliGRAM(s) Oral daily  aspirin enteric coated 81 milliGRAM(s) Oral daily  chlorhexidine 4% Liquid 1 Application(s) Topical <User Schedule>  dicyclomine 10 milliGRAM(s) Oral daily  enoxaparin Injectable 40 milliGRAM(s) SubCutaneous at bedtime  furosemide    Tablet 20 milliGRAM(s) Oral daily  gemfibrozil 600 milliGRAM(s) Oral two times a day  insulin regular  human recombinant 10 Unit(s) IV Push once  melatonin 5 milliGRAM(s) Oral at bedtime  metoprolol tartrate 100 milliGRAM(s) Oral two times a day  pantoprazole    Tablet 40 milliGRAM(s) Oral before breakfast  ranolazine 1000 milliGRAM(s) Oral two times a day  tamsulosin 0.4 milliGRAM(s) Oral at bedtime        Assessment and Plan:  Pt is a 71 year old male with PMHx of CAD s/p CABG ~10 years ago, HTN, BPH, HLD, CKD ,  presents with SOB, cough, general, malaise, and decreased PO intake for 5 days prior to presentation. Currently in CEU on 6L nasal cannula    Acute Hypoxic respiratory failure due to severe COVID 19 PNA  Cytokine storm  Transaminitis  currently patient feeling good, no dyspnea at rest or during ambulation, on 3L nasal cannula saturating 90%  NIV at bedtime and PRN, patient ambulates to the restroom independently   s/p Toci 4/18, not a candidate for plasma or RDV  s/p 10 day course of decadron  Ddimer trending down  LE duplex 4/22 negative for DVT  off abx, procal 0.1  - continue to monitor LFT's closely        Tortuous Aorta/Aneysmal dilatation on chest xray  worsening as compared to prior  Patient without symptoms  CT chest abdomen without contrast 4/23: 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 Abdominal aortic aneurysm, maximally 4.9 cm at the diaphragmatic hiatus.  Displaced intimal calcifications are suspicious for aortic dissection, age-indeterminate but potentially chronic. Note limited evaluation of the vasculature on noncontrast imaging  Vascular on board, s/p CTA chest/A/P  Discussed results with vascular team, will need outpatient f/up     RUTH on CKD stage 3  Hyperkalemia -s/p kayexalate tx.  Cr stable,   resumed on  lasix   holding ACE, Lokelma 10g PRN for K >5.5  renal following     CAD s/p CABG ~10 years ago  HTN  DLD  continue asa 81, metoprolol, Ranolazine  - due to transaminitis hold - statin, gemfibrozil  holding lisinopril   Cardiology following     #Progress Note Handoff: Patient medically optimized, will need home oxygen tx. arrangement  Family discussion: yes, medical team Disposition: Home__once home oxygen tx. is arranged if transaminitis is improving.    
JORDIN GARCIA  71y Male    CHIEF COMPLAINT:    Patient is a 71y old  Male who presents with a chief complaint of COVID-19 (24 Apr 2021 13:08)      INTERVAL HPI/OVERNIGHT EVENTS:    Patient seen and examined. No acute events overnight. No active complaints, remains on NC    ROS: All other systems are negative.    Vital Signs:    T(F): 96.2 (04-25-21 @ 05:07), Max: 96.9 (04-24-21 @ 21:17)  HR: 75 (04-25-21 @ 05:07) (69 - 77)  BP: 116/68 (04-25-21 @ 05:07) (116/68 - 138/71)  RR: 18 (04-25-21 @ 07:41) (18 - 19)  SpO2: 96% (04-25-21 @ 07:41) (95% - 99%)    PHYSICAL EXAM:    GENERAL:  NAD  SKIN: No rashes or lesions  HEENT: Atraumatic. Normocephalic.   NECK: Supple, No JVD.   PULMONARY: coarse breath sounds b/l. No wheezing. No rales  CVS: Normal S1, S2. Rate and Rhythm are regular.    ABDOMEN/GI: Soft, Nontender, Nondistended; BS present  MSK:  No edema B/L LE. No clubbing ore cyanosis  NEUROLOGIC:  moves all extremities  PSYCH: Alert & oriented x 3, normal affect    Consultant(s) Notes Reviewed:  [x ] YES  [ ] NO  Care Discussed with Consultants/Other Providers [ x] YES  [ ] NO    LABS:                        11.6   9.67  )-----------( 293      ( 25 Apr 2021 07:41 )             35.0     133<L>  |  103  |  37<H>  ----------------------------<  87  4.8   |  20  |  1.1    Ca    8.0<L>      25 Apr 2021 07:41  Mg     2.3     04-25    TPro  5.2<L>  /  Alb  2.8<L>  /  TBili  0.8  /  DBili  x   /  AST  112<H>  /  ALT  55<H>  /  AlkPhos  94  04-25    RADIOLOGY & ADDITIONAL TESTS:  Imaging or report Personally Reviewed:  [x] YES  [ ] NO  EKG reviewed: [x] YES  [ ] NO    Medications:  Standing  allopurinol 300 milliGRAM(s) Oral daily  amLODIPine   Tablet 5 milliGRAM(s) Oral daily  aspirin enteric coated 81 milliGRAM(s) Oral daily  atorvastatin 80 milliGRAM(s) Oral at bedtime  chlorhexidine 4% Liquid 1 Application(s) Topical <User Schedule>  dicyclomine 10 milliGRAM(s) Oral daily  gemfibrozil 600 milliGRAM(s) Oral two times a day  heparin   Injectable 7500 Unit(s) SubCutaneous every 12 hours  insulin regular  human recombinant 10 Unit(s) IV Push once  melatonin 5 milliGRAM(s) Oral at bedtime  metoprolol tartrate 100 milliGRAM(s) Oral two times a day  pantoprazole    Tablet 40 milliGRAM(s) Oral before breakfast  ranolazine 1000 milliGRAM(s) Oral two times a day  sodium chloride 0.9%. 1000 milliLiter(s) IV Continuous <Continuous>  sodium zirconium cyclosilicate 10 Gram(s) Oral two times a day  tamsulosin 0.4 milliGRAM(s) Oral at bedtime    PRN Meds  acetaminophen   Tablet .. 650 milliGRAM(s) Oral every 6 hours PRN            
JORDIN GARCIA  71y Male    CHIEF COMPLAINT:    Patient is a 71y old  Male who presents with a chief complaint of COVID-19 (27 Apr 2021 13:53)      INTERVAL HPI/OVERNIGHT EVENTS:    Patient seen and examined. No acute events overnight.     ROS: All other systems are negative.    Vital Signs:    T(F): 97.2 (04-27-21 @ 12:40), Max: 97.7 (04-26-21 @ 20:36)  HR: 78 (04-27-21 @ 12:40) (69 - 91)  BP: 130/69 (04-27-21 @ 12:40) (130/64 - 131/65)  RR: 17 (04-27-21 @ 12:40) (17 - 18)  SpO2: 93% (04-27-21 @ 12:40) (93% - 95%)    PHYSICAL EXAM:    GENERAL:  NAD  SKIN: No rashes or lesions  HEENT: Atraumatic. Normocephalic.  NECK: Supple, No JVD.   PULMONARY: coarse breath sounds. No wheezing. No rales  CVS: Normal S1, S2. Rate and Rhythm are regular.  ABDOMEN/GI: Soft, Nontender, Nondistended  MSK: no clubbing or cyanosis  NEUROLOGIC:  moves all extremities.  PSYCH: Alert & oriented x 3, normal affect    Consultant(s) Notes Reviewed:  [x ] YES  [ ] NO  Care Discussed with Consultants/Other Providers [ x] YES  [ ] NO    LABS:                        11.7   9.82  )-----------( 281      ( 27 Apr 2021 08:21 )             35.7     136  |  104  |  26<H>  ----------------------------<  76  5.1<H>   |  22  |  1.1    Ca    8.7      27 Apr 2021 08:21  Mg     2.3     04-27    TPro  5.7<L>  /  Alb  3.2<L>  /  TBili  0.8  /  DBili  x   /  AST  242<H>  /  ALT  89<H>  /  AlkPhos  85  04-27    RADIOLOGY & ADDITIONAL TESTS:  Imaging or report Personally Reviewed:  [x] YES  [ ] NO  EKG reviewed: [x] YES  [ ] NO    Medications:  Standing  allopurinol 300 milliGRAM(s) Oral daily  amLODIPine   Tablet 5 milliGRAM(s) Oral daily  aspirin enteric coated 81 milliGRAM(s) Oral daily  atorvastatin 80 milliGRAM(s) Oral at bedtime  chlorhexidine 4% Liquid 1 Application(s) Topical <User Schedule>  dicyclomine 10 milliGRAM(s) Oral daily  enoxaparin Injectable 40 milliGRAM(s) SubCutaneous at bedtime  furosemide    Tablet 20 milliGRAM(s) Oral daily  gemfibrozil 600 milliGRAM(s) Oral two times a day  insulin regular  human recombinant 10 Unit(s) IV Push once  melatonin 5 milliGRAM(s) Oral at bedtime  metoprolol tartrate 100 milliGRAM(s) Oral two times a day  pantoprazole    Tablet 40 milliGRAM(s) Oral before breakfast  ranolazine 1000 milliGRAM(s) Oral two times a day  tamsulosin 0.4 milliGRAM(s) Oral at bedtime    PRN Meds  acetaminophen   Tablet .. 650 milliGRAM(s) Oral every 6 hours PRN

## 2021-04-29 NOTE — DISCHARGE NOTE PROVIDER - NSDCMRMEDTOKEN_GEN_ALL_CORE_FT
ALLOPURINOL  TAB 300M tab(s) orally once a day  aspirin 81 mg oral tablet: 1 tab(s) orally once a day  ATORVASTATIN 80 MG TABLET: TAKE 1 TABLET BY MOUTH EVERY DAY  DICYCLOMINE  CAP 10M cap(s) orally once a day (at bedtime)  EZETIMIBE    TAB 10M  orally once a day  FUROSEMIDE   TAB 20M tab(s) orally once a day  GEMFIBROZIL  TAB 600M tab(s) orally 2 times a day  LISINOPRIL   TAB 10M tab(s) orally once a day  METOPROL TAR TAB 100M tab(s) orally 2 times a day  paricalcitol 1 mcg oral capsule: 1 cap(s) orally once a day  RANOLAZINE   TAB 1000M tab(s) orally 2 times a day  TAMSULOSIN   CAP 0.4M cap(s) orally once a day (at bedtime)   ALLOPURINOL  TAB 300M tab(s) orally once a day  aspirin 81 mg oral tablet: 1 tab(s) orally once a day  DICYCLOMINE  CAP 10M cap(s) orally once a day (at bedtime)  EZETIMIBE    TAB 10M  orally once a day  FUROSEMIDE   TAB 20M tab(s) orally once a day  GEMFIBROZIL  TAB 600M tab(s) orally 2 times a day  LISINOPRIL   TAB 10M tab(s) orally once a day  METOPROL TAR TAB 100M tab(s) orally 2 times a day  paricalcitol 1 mcg oral capsule: 1 cap(s) orally once a day  RANOLAZINE   TAB 1000M tab(s) orally 2 times a day  TAMSULOSIN   CAP 0.4M cap(s) orally once a day (at bedtime)   ALLOPURINOL  TAB 300M tab(s) orally once a day  aspirin 81 mg oral tablet: 1 tab(s) orally once a day  DICYCLOMINE  CAP 10M cap(s) orally once a day (at bedtime)  EZETIMIBE    TAB 10M  orally once a day  FUROSEMIDE   TAB 20M tab(s) orally once a day  METOPROL TAR TAB 100M tab(s) orally 2 times a day  paricalcitol 1 mcg oral capsule: 1 cap(s) orally once a day  RANOLAZINE   TAB 1000M tab(s) orally 2 times a day  TAMSULOSIN   CAP 0.4M cap(s) orally once a day (at bedtime)

## 2021-04-29 NOTE — DISCHARGE NOTE PROVIDER - NSDCCPCAREPLAN_GEN_ALL_CORE_FT
PRINCIPAL DISCHARGE DIAGNOSIS  Diagnosis: COVID-19  Assessment and Plan of Treatment: COVID-19 is the disease caused by a coronavirus first discovered in December 2019. Coronaviruses generally cause upper respiratory (nose, throat, and lung) infections, such as a cold. The new virus can also cause serious lower respiratory conditions, such as pneumonia or acute respiratory distress syndrome (ARDS). The new virus can also affect many other organs, including the brain and heart. Blood vessels can also be affected, leading to blood clots. Anyone can develop serious problems from the new virus, but your risk is higher if you are 65 or older. A weak immune system, diabetes, or a heart or lung condition can also increase your risk. Your risk is also higher if you are a current or former cigarette smoker.  Call your local emergency number (911 in the ) or an emergency department if:   •You have trouble breathing or shortness of breath at rest.  •You have chest pain or pressure that lasts longer than 5 minutes.  •You become confused or hard to wake.  •Your lips or face are blue.  •You have a fever of 104°F (40°C) or higher.  Call your doctor if:   •You do not have symptoms of COVID-19 but had close physical contact within 14 days with someone who tested positive.  •You have questions or concerns about your condition or care.      SECONDARY DISCHARGE DIAGNOSES  Diagnosis: Dissecting aneurysm of thoracic aorta, Du type B  Assessment and Plan of Treatment: Aortic dissection happens when there is a tear in the wall of the body's main blood vessel (aorta). The aorta leads out of the heart (ascending aorta), curves around, and then goes down the chest (descending aorta) and into the abdomen to supply arteries with blood. The wall of the aorta has inner and outer layers.   As blood collects along the tear, one part of the aorta continues to carry blood to the body, but blood can also flow into the tear between the layers of the aorta. The torn part of the aorta fills with blood and swells. This can reduce blood flow through the part of the aorta that is still supplying blood to the body. Aortic dissection is a medical emergency.  General instructions   Take over-the-counter and prescription medicines only as told by your health care provider.   Talk with your health care provider about how to manage stress.   Keep all follow-up visits as told by your health care provider. This is important.  Get help right away if you:  Develop any symptoms of aortic dissection after treatment, including severe pain in your chest, back, or abdomen.   Have pain in your chest.   Have weakness in your arm or leg.   Have pain in your abdomen.   Have trouble breathing or you develop a cough.   Faint.   Develop a racing heartbeat (palpitations).    Diagnosis: Acute kidney injury superimposed on CKD  Assessment and Plan of Treatment: WHAT YOU NEED TO KNOW:  Acute kidney injury (RUTH) is also called acute kidney failure, or acute renal failure. RUTH happens when your kidneys suddenly stop working correctly. Normally, the kidneys remove fluid, chemicals, and waste from your blood. These wastes are turned into urine by your kidneys. RUTH usually happens over hours or days. When you have RUTH, your kidneys do not remove the waste, chemicals, or extra fluid from your body. A normal amount of urine is not produced. RUTH is usually temporary, it can take days to months to recover. RUTH can also become a chronic kidney condition.   DISCHARGE INSTRUCTIONS:  Call 911 if:   •You have sudden chest pain or trouble breathing.   Seek care immediately if:   •Your symptoms get worse.   Contact your healthcare provider if:   •Your symptoms return.  •Your blood sugar or blood pressure level is not within the range your healthcare provider recommends.  •You have questions or concerns about your condition or care.      Diagnosis: Transaminitis  Assessment and Plan of Treatment: Follow up with your primary care doctor to do blood draws in 1 week.  Don't take your cholesterol medication until you follow up with your doctor.  Transaminitis just means that there are markers in your blood that indicate that you may have some ongoing liver damage. In your case it is most likely due to COVID.

## 2021-04-29 NOTE — DISCHARGE NOTE NURSING/CASE MANAGEMENT/SOCIAL WORK - PATIENT PORTAL LINK FT
You can access the FollowMyHealth Patient Portal offered by [facility] by registering at the following website: [email or web address]. By joining [employer]’s FollowMyHealth portal, you will also be able to view your health information using other applications (apps) compatible with our system.

## 2021-04-29 NOTE — DISCHARGE NOTE PROVIDER - CARE PROVIDER_API CALL
Roberto Kelly)  Surgery; Vascular Surgery  [address] [facility], [address] [address]  Phone: [phone]  Fax: [phone]  Follow Up Time: 1 week    Ruiz Huang  INTERNAL MEDICINE  [address] [place] [address]  Phone: [phone]  Fax: [phone]  Follow Up Time: 2 weeks    John Velásquez  CRITICAL CARE MEDICINE  [address] [place] [address]  Phone: [phone]  Fax: [phone]  Follow Up Time: 2 weeks    [facility]  [id number]  [address] [place] [id number]  Phone: ([phone]  Fax: ()-  Follow Up Time: 1 week

## 2021-04-29 NOTE — DISCHARGE NOTE PROVIDER - HOSPITAL COURSE
CC: 72 y/o M p/w SOB, cough, general malaise, decreased PO intake x5 days    PMH: CAD s/p CABG ~10 years ago, HTN, BPH, HLD, CKD due to R sided renal disease(?)    HPI: Pt reports that family member tested positive [place] COVID 3 weeks ago so he got tested and was positive as well. Pulse ox on day of admission was in the 80s so he came to ED. No headache, fever, nausea, vomiting,  leg pain/swelling, change in bowel habits or urinary symptoms.     Hospital course:   Treated with toci and dexamethasone. Never a candidate for plasma or RDV since out of window. Dimer originally elevated to 3000, but LE duplex negative. He was given supplementary O2 as needed. Patient's best respiratory status was on the 3L NC but cannot tolerate room air despite finishing COVID therapeutic treatments. Patient will be discharged with home O2.   He was found to have an abdominal aneurysm and type B aortic dissection on CTA A/P. Patient was asymptomatic. Evaluated by vascular who recommended he be seen by them as outpatient for possible surgical repair. He will follow up with them in 1-2 weeks post discharge.   Patient also had RUTH on CKD (Cr 1.9, max) which improved prior to admission on fluids. Patient was restarted on lasix prior to discharge.   Additionally, prior to admission he developed some worsening, but asymptomatic transaminitis (AST 100s>300s). We contacted his primary physician and patient will follow up with him in a week to repeat LFTs. In the meantime, patient is instructed to stay off the statins and to decrease tylenol intake until he is seen again by PMD.    CC: 70 y/o M p/w SOB, cough, general malaise, decreased PO intake x5 days    PMH: CAD s/p CABG ~10 years ago, HTN, BPH, HLD, CKD due to R sided renal disease(?)    HPI: Pt reports that family member tested positive fort COVID 3 weeks ago so he got tested and was positive as well. Pulse ox on day of admission was in the 80s so he came to ED. No headache, fever, nausea, vomiting,  leg pain/swelling, change in bowel habits or urinary symptoms.     Hospital course:   Treated with toci and dexamethasone. Never a candidate for plasma or RDV since out of window. Dimer originally elevated to 3000, but LE duplex negative. He was given supplementary O2 as needed. Patient's best respiratory status was on the 3L NC but cannot tolerate room air despite finishing COVID therapeutic treatments. Patient will be discharged with home O2.   He was found to have an abdominal aneurysm and type B aortic dissection on CTA A/P. Patient was asymptomatic. Evaluated by vascular who recommended he be seen by them as outpatient for possible surgical repair. He will follow up with them in 1-2 weeks post discharge.   Patient also had RUTH on CKD (Cr 1.9, max) which improved prior to admission on fluids. Patient was restarted on lasix prior to discharge.   Additionally, prior to admission he developed some worsening, but asymptomatic transaminitis (AST 100s>300s). We contacted his primary physician and patient will follow up with him in a week to repeat LFTs. In the meantime, patient is instructed to stay off the statins and to decrease tylenol intake until he is seen again by PMD.
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
## 2021-05-05 DIAGNOSIS — J12.82 PNEUMONIA DUE TO CORONAVIRUS DISEASE 2019: ICD-10-CM

## 2021-05-05 DIAGNOSIS — R00.0 TACHYCARDIA, UNSPECIFIED: ICD-10-CM

## 2021-05-05 DIAGNOSIS — I51.0 CARDIAC SEPTAL DEFECT, ACQUIRED: ICD-10-CM

## 2021-05-05 DIAGNOSIS — I12.9 HYPERTENSIVE CHRONIC KIDNEY DISEASE WITH STAGE 1 THROUGH STAGE 4 CHRONIC KIDNEY DISEASE, OR UNSPECIFIED CHRONIC KIDNEY DISEASE: ICD-10-CM

## 2021-05-05 DIAGNOSIS — Z87.891 PERSONAL HISTORY OF NICOTINE DEPENDENCE: ICD-10-CM

## 2021-05-05 DIAGNOSIS — D72.810 LYMPHOCYTOPENIA: ICD-10-CM

## 2021-05-05 DIAGNOSIS — Z95.1 PRESENCE OF AORTOCORONARY BYPASS GRAFT: ICD-10-CM

## 2021-05-05 DIAGNOSIS — R79.89 OTHER SPECIFIED ABNORMAL FINDINGS OF BLOOD CHEMISTRY: ICD-10-CM

## 2021-05-05 DIAGNOSIS — E78.5 HYPERLIPIDEMIA, UNSPECIFIED: ICD-10-CM

## 2021-05-05 DIAGNOSIS — N18.30 CHRONIC KIDNEY DISEASE, STAGE 3 UNSPECIFIED: ICD-10-CM

## 2021-05-05 DIAGNOSIS — J96.01 ACUTE RESPIRATORY FAILURE WITH HYPOXIA: ICD-10-CM

## 2021-05-05 DIAGNOSIS — R74.01 ELEVATION OF LEVELS OF LIVER TRANSAMINASE LEVELS: ICD-10-CM

## 2021-05-05 DIAGNOSIS — E87.5 HYPERKALEMIA: ICD-10-CM

## 2021-05-05 DIAGNOSIS — N14.1 NEPHROPATHY INDUCED BY OTHER DRUGS, MEDICAMENTS AND BIOLOGICAL SUBSTANCES: ICD-10-CM

## 2021-05-05 DIAGNOSIS — I71.6 THORACOABDOMINAL AORTIC ANEURYSM, WITHOUT RUPTURE: ICD-10-CM

## 2021-05-05 DIAGNOSIS — Z79.82 LONG TERM (CURRENT) USE OF ASPIRIN: ICD-10-CM

## 2021-05-05 DIAGNOSIS — I71.03 DISSECTION OF THORACOABDOMINAL AORTA: ICD-10-CM

## 2021-05-05 DIAGNOSIS — M1A.9XX0 CHRONIC GOUT, UNSPECIFIED, WITHOUT TOPHUS (TOPHI): ICD-10-CM

## 2021-05-05 DIAGNOSIS — Z95.5 PRESENCE OF CORONARY ANGIOPLASTY IMPLANT AND GRAFT: ICD-10-CM

## 2021-05-05 DIAGNOSIS — G47.33 OBSTRUCTIVE SLEEP APNEA (ADULT) (PEDIATRIC): ICD-10-CM

## 2021-05-05 DIAGNOSIS — N40.0 BENIGN PROSTATIC HYPERPLASIA WITHOUT LOWER URINARY TRACT SYMPTOMS: ICD-10-CM

## 2021-05-05 DIAGNOSIS — R05 COUGH: ICD-10-CM

## 2021-05-05 DIAGNOSIS — R63.8 OTHER SYMPTOMS AND SIGNS CONCERNING FOOD AND FLUID INTAKE: ICD-10-CM

## 2021-05-05 DIAGNOSIS — K76.9 LIVER DISEASE, UNSPECIFIED: ICD-10-CM

## 2021-05-05 DIAGNOSIS — N17.9 ACUTE KIDNEY FAILURE, UNSPECIFIED: ICD-10-CM

## 2021-05-05 DIAGNOSIS — T50.8X5A ADVERSE EFFECT OF DIAGNOSTIC AGENTS, INITIAL ENCOUNTER: ICD-10-CM

## 2021-05-05 DIAGNOSIS — U07.1 COVID-19: ICD-10-CM

## 2021-05-05 PROBLEM — Z00.00 ENCOUNTER FOR PREVENTIVE HEALTH EXAMINATION: Status: ACTIVE | Noted: 2021-05-05

## 2024-01-12 NOTE — ED ADULT NURSE NOTE - SUICIDE SCREENING DEPRESSION
Received request via: Pharmacy    Was the patient seen in the last year in this department? Yes    Does the patient have an active prescription (recently filled or refills available) for medication(s) requested? No    Does the patient have detention Plus and need 100 day supply (blood pressure, diabetes and cholesterol meds only)? Patient does not have SCP   Negative

## 2024-02-06 NOTE — ED ADULT NURSE NOTE - NS ED NOTE ABUSE RESPONSE YN
Kenney Mcgowan  [address] [facility] [address]           To Whom It May Concern:     Kenney Mcgowan is a patient under my medical care. It is advised to limit work with the following: limited to 8 hour workday for 5 days a week- ,starting 05/16/23 due to medical condition till indefinitely     If you have questions, please reach out to the clinic at [phone]        Sincerely,           Luisa Fonseca MD                 Yes

## 2024-02-19 NOTE — ED PROVIDER NOTE - TOBACCO USE
Patient came to clinic accompanied by significant other anticoagulation monitoring.  Last INR on 2/15/24 was 4.6.  Dose decreased.   Today's INR is 1.8 and is below goal range.    Current warfarin total weekly dose of 10 mg verified.  Informed the INR result is below therapeutic range and instructed to increase current dose per protocol. Discussed dose and return date of 2/23/24 for next INR. See Anticoagulation flowsheet.    INR today is 1.8, below range and down from previous INR of 4.7, in four days on dose of 10mg/last seven days with three held doses. Decreasing Keppra by 250mg every 2 weeks until discontinued, no DDI. Denies any other changes with diet/meds/health or signs of bleeding. PCP suggested pt look into Eliquis and gave pt GoodRX coupons. If pt is not able to switch to Eliquis, will need to send application for Home INR machine.  Pt's significant other will call insurance this week to determine if Eliquis would be affordable. Advised to have pt take 3mg, 3mg, 2mg, 2mg and recheck INR with [facility] on Friday 2/23. POCT order placed. Will have 13mg at next check. Per review of chart, TWD may need to be adjusted to be around 15-16mg/wk.    Dr. Ventura is in the office today supervising the treatment.  Susan Olszewski, [facility] referring provider.     Call your physician immediately if you notice any of the following symptoms of a blood clot:   Sudden weakness in any limb  Numbness or tingling anywhere  Visual changes or loss of sight in either eye  Sudden onset of slurred speech or inability to speak  Dizziness or faintness  New pain, swelling, redness or heat in any extremity  New SOB or chest pain  Symptoms associated with blood clotting/low INR reviewed and verbalizes understanding.    Instructed to contact the clinic with any unusual bleeding or bruising, any changes in medications, diet, health status, lifestyle, or any other changes, questions or concerns. Verbalized understanding of all  discussed.      Former smoker No

## 2024-03-24 NOTE — DISCHARGE NOTE PROVIDER - CARE PROVIDERS DIRECT ADDRESSES
non-distended/non-tender
,[email or web address],DirectAddress_Unknown,DirectAddress_Unknown,DirectAddress_Unknown
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
